# Patient Record
Sex: MALE | Race: BLACK OR AFRICAN AMERICAN | Employment: OTHER | URBAN - METROPOLITAN AREA
[De-identification: names, ages, dates, MRNs, and addresses within clinical notes are randomized per-mention and may not be internally consistent; named-entity substitution may affect disease eponyms.]

---

## 2017-01-06 ENCOUNTER — ANTI-COAG VISIT (OUTPATIENT)
Dept: PHARMACY | Age: 47
End: 2017-01-06

## 2017-01-06 LAB
INR BLD: 2.9
PROTIME: 34.4 SECONDS

## 2017-02-01 ENCOUNTER — HOSPITAL ENCOUNTER (OUTPATIENT)
Dept: OTHER | Age: 47
Discharge: HOME | End: 2017-02-28
Attending: INTERNAL MEDICINE | Admitting: INTERNAL MEDICINE

## 2017-02-07 ENCOUNTER — ANTI-COAG VISIT (OUTPATIENT)
Dept: PHARMACY | Age: 47
End: 2017-02-07

## 2017-02-07 LAB — INR BLD: 1.5

## 2017-02-20 ENCOUNTER — TELEPHONE (OUTPATIENT)
Dept: PHARMACY | Age: 47
End: 2017-02-20

## 2017-02-23 ENCOUNTER — ANTI-COAG VISIT (OUTPATIENT)
Dept: PHARMACY | Age: 47
End: 2017-02-23

## 2017-02-23 LAB
INR BLD: 2.2
PROTIME: 26 SECONDS

## 2017-03-23 ENCOUNTER — ANTI-COAG VISIT (OUTPATIENT)
Dept: PHARMACY | Age: 47
End: 2017-03-23

## 2017-03-23 LAB — INR BLD: 3

## 2017-04-20 ENCOUNTER — ANTI-COAG VISIT (OUTPATIENT)
Dept: PHARMACY | Age: 47
End: 2017-04-20

## 2017-04-20 LAB — INR BLD: 2.1

## 2017-05-24 ENCOUNTER — ANTI-COAG VISIT (OUTPATIENT)
Dept: PHARMACY | Age: 47
End: 2017-05-24

## 2017-05-24 LAB
INR BLD: 2.5
PROTIME: 29.6 SECONDS

## 2017-06-21 ENCOUNTER — TELEPHONE (OUTPATIENT)
Dept: PHARMACY | Age: 47
End: 2017-06-21

## 2017-06-29 ENCOUNTER — ANTI-COAG VISIT (OUTPATIENT)
Dept: PHARMACY | Age: 47
End: 2017-06-29

## 2017-06-29 LAB — INR BLD: 3.5

## 2017-08-01 ENCOUNTER — ANTI-COAG VISIT (OUTPATIENT)
Dept: PHARMACY | Age: 47
End: 2017-08-01

## 2017-08-01 ENCOUNTER — HOSPITAL ENCOUNTER (OUTPATIENT)
Dept: OTHER | Age: 47
Discharge: HOME | End: 2017-08-31
Attending: INTERNAL MEDICINE | Admitting: INTERNAL MEDICINE

## 2017-08-01 LAB
INR BLD: 1.5
PROTIME: 17.8 SECONDS

## 2017-08-15 ENCOUNTER — TELEPHONE (OUTPATIENT)
Dept: PHARMACY | Age: 47
End: 2017-08-15

## 2017-08-17 ENCOUNTER — ANTI-COAG VISIT (OUTPATIENT)
Dept: PHARMACY | Age: 47
End: 2017-08-17

## 2017-08-17 LAB — INR BLD: 2.3

## 2017-09-07 ENCOUNTER — ANTI-COAG VISIT (OUTPATIENT)
Dept: PHARMACY | Age: 47
End: 2017-09-07

## 2017-09-07 LAB
INR BLD: 2.7
PROTIME: 32.4 SECONDS

## 2017-09-21 ENCOUNTER — TELEPHONE (OUTPATIENT)
Dept: PHARMACY | Age: 47
End: 2017-09-21

## 2017-09-26 ENCOUNTER — TELEPHONE (OUTPATIENT)
Dept: PHARMACY | Age: 47
End: 2017-09-26

## 2017-10-06 ENCOUNTER — ANTI-COAG VISIT (OUTPATIENT)
Dept: PHARMACY | Age: 47
End: 2017-10-06

## 2017-10-06 LAB
INR BLD: 2.2
PROTIME: 26.7 SECONDS

## 2017-10-06 NOTE — PROGRESS NOTES
Mr. Tish Luna is a 56 y.o. y/o male with history ofpulmonary embolism with infarction. He has been taking warfarin since 2001 who presents today for anticoagulation monitoring and adjustment. Pertinent PMH: had a DVT and two pulmonary embolism. (+) hepatitis B (2/2016)  Patient Reported Findings:  Yes     No  [x]   []       Patient verifies current dosing regimen as listed  []   [x]       S/S bleeding/bruising/swelling/SOB  []   [x]       Blood in urine or stool  []   [x]       Procedures scheduled in the future at this time  [x]   []       Missed Dose Got a tattoo (9/28), held warfarin for 3 days prior in order to get  []   [x]       Extra Dose  []   [x]       Change in medications  [x]   []       Change in health/diet/appetite patient states that he has switched to a vegan diet, is controlling vegetables   [x]   []       Change in alcohol use- had some alcohol about 3 nights ago  []   [x]       Change in activity  []   [x]       Hospital admission  []   [x]       Emergency department visit  []   [x]       Other complaints    Clinical Outcomes:  Yes     No  []   [x]       Major bleeding event  []   [x]       Thromboembolic event  Pt holds 1/2 dose of warfarin on nights he drinks. Aware of the impact of alcohol. Duration of warfarin Therapy: indefinitely  INR Range:  2.0-3.0  Cautious when holding warfarin--INR drops quickly      INR 2.2 today  Continue same weekly dose of 3.75mg (1/2 tablet) on Mon, Wed & Fri and 7.5mg (1 tablet) all other days  Encouraged to maintain a consistency of vegetables/salads. Recheck INR in 4 weeks, 11/3.     Referring PCP is Dr Daniel Peace  INR (no units)   Date Value   10/06/2017 2.2   09/07/2017 2.7   08/17/2017 2.3   08/01/2017 1.5

## 2017-10-06 NOTE — MR AVS SNAPSHOT
After Visit Summary             Rony Moralez   10/6/2017 9:45 AM   Anti-coag visit    Description:  Male : 1970   Provider:  MIGUEL ANGEL Hartley [facility]   Department:  [address]/[facility]              Your Follow-Up and Future Appointments         Below is a list of your follow-up and future appointments. This may not be a complete list as you may have made appointments directly with providers that we are not aware of or your providers may have made some for you. Please call your providers to confirm appointments. It is important to keep your appointments. Please bring your current insurance card, photo ID, co-pay, and all medication bottles to your appointment. If self-pay, payment is expected at the time of service. Your To-Do List     Future Appointments Provider Department Dept Phone    11/3/2017 9:30 AM [facility] [phone]         Information from Your Visit        Department     Name Address Phone Fax    [address] [facility] [place] [address] [phone]      Anticoagulation Summary as of 10/6/2017              Today's INR 2.2    Next INR check 11/3/2017      Description           Continue same weekly dose of 3.75mg (1/2 tablet) on Mon, Wed & Fri and 7.5mg (1 tablet) all other days. Vital Signs     Smoking Status                   Former Smoker              Medications and Orders      Your Current Medications Are              Multiple Vitamins-Minerals (CENTRUM ADULTS PO) Take 1 tablet by mouth daily    warfarin (COUMADIN) 7.5 MG tablet Take 7.5 mg by mouth See Admin Instructions Dose adjusted by [facility]    traMADol (ULTRAM) 50 MG tablet Take 50 mg by mouth every 6 hours as needed.         Allergies              Morphine Nausea And Vomiting      We Ordered/Performed the following           Protime-INR     Comments: This external order was created through the results console. Result Summary for Protime-INR      Result Information     Status          Final result (Resulted: 10/6/2017 10:38 AM)           10/6/2017 10:38 AM      Component Results     Component Value Ref Range & Units Status    INR 2.2  Final    Protime 26.7 seconds Final               Additional Information        Basic Information     Date Of Birth Sex Race Ethnicity Preferred Language    1970 Male Black Non-/Non  English      Problem List as of 10/6/2017                 GI bleed    Acute hepatitis B    Hematemesis    Other pulmonary embolism and infarction      Preventive Care        Date Due    Tetanus Combination Vaccine (1 - Tdap) 2/16/1990    Cholesterol Screening 2/16/2011    Yearly Flu Vaccine (1) 9/1/2017            MyChart Signup           [employer] allows you to send messages to your doctor, view your test results, renew your prescriptions, schedule appointments, view visit notes, and more. How Do I Sign Up? 1. In your Internet browser, go to [email or web address]  2. Click on the Sign Up Now link in the Sign In box. You will see the New Member Sign Up page. 3. Enter your [facility] Access Code exactly as it appears below. You will not need to use this code after youve completed the sign-up process. If you do not sign up before the expiration date, you must request a new code. [employer] Access Code: [email or web address]  Expires: 11/6/2017 10:59 AM    4. Enter your Social Security Number ([id number]) and Date of Birth (mm/dd/yyyy) as indicated and click Submit. You will be taken to the next sign-up page. 5. Create a Unbound ID. This will be your [facility] login ID and cannot be changed, so think of one that is secure and easy to remember. 6. Create a [facility] password. You can change your password at any time. 7. Enter your Password Reset Question and Answer.  This can be used at a later time if you forget your password. 8. Enter your e-mail address. You will receive e-mail notification when new information is available in [address] 9. Click Sign Up. You can now view your medical record. Additional Information  If you have questions, please contact the physician practice where you receive care. Remember, MyChart is NOT to be used for urgent needs. For medical emergencies, dial 911. For questions regarding your MyChart account call [phone]. If you have a clinical question, please call your doctor's office. October 2017 Details    Sun Mon Tue Wed Thu Fri Sat     1               2               3               4               5               6      3.75 mg   See details      7      7.5 mg           8      7.5 mg         9      3.75 mg         10      7.5 mg         11      3.75 mg         12      7.5 mg         13      3.75 mg         14      7.5 mg           15      7.5 mg         16      3.75 mg         17      7.5 mg         18      3.75 mg         19      7.5 mg         20      3.75 mg         21      7.5 mg           22      7.5 mg         23      3.75 mg         24      7.5 mg         25      3.75 mg         26      7.5 mg         27      3.75 mg         28      7.5 mg           29      7.5 mg         30      3.75 mg         31      7.5 mg              Date Details   10/06 This INR check               How to take your warfarin dose              To take:  3.75 mg Take 0.5 of a 7.5 mg tablet. To take:  7.5 mg Take 1 of the 7.5 mg tablets.            November 2017 Details    Sun Mon Tue Wed Thu Fri Sat        1      3.75 mg         2      7.5 mg         3      3.75 mg         4                 5               6               7               8               9               10               11                 12               13               14               15               16               17               18                 19               20               21

## 2017-11-01 ENCOUNTER — HOSPITAL ENCOUNTER (OUTPATIENT)
Dept: OTHER | Age: 47
Discharge: HOME | End: 2017-11-30
Attending: INTERNAL MEDICINE | Admitting: INTERNAL MEDICINE

## 2017-11-03 ENCOUNTER — TELEPHONE (OUTPATIENT)
Dept: PHARMACY | Age: 47
End: 2017-11-03

## 2017-11-09 ENCOUNTER — TELEPHONE (OUTPATIENT)
Dept: PHARMACY | Age: 47
End: 2017-11-09

## 2017-11-13 ENCOUNTER — ANTI-COAG VISIT (OUTPATIENT)
Dept: PHARMACY | Age: 47
End: 2017-11-13

## 2017-11-13 LAB
INR BLD: 2.9
PROTIME: 34.8 SECONDS

## 2017-11-13 NOTE — PROGRESS NOTES
Mr. Arun Navarro is a 56 y.o. y/o male with history ofpulmonary embolism with infarction. He has been taking warfarin since 2001 who presents today for anticoagulation monitoring and adjustment. Pertinent PMH: had a DVT and two pulmonary embolism. (+) hepatitis B (2/2016)  Patient Reported Findings:  Yes     No  [x]   []       Patient verifies current dosing regimen as listed  []   [x]       S/S bleeding/bruising/swelling/SOB  []   [x]       Blood in urine or stool  []   [x]       Procedures scheduled in the future at this time  [x]   []       Missed Dose Missed dose Fri, Sat, Sun b/c ran out of warf and could not p/u till mon  []   [x]       Extra Dose  []   [x]       Change in medications  [x]   []       Change in health/diet/appetite patient states that he has switched to a vegan diet, is controlling vegetables. Thinks that he is going to wait until after the holidays  []   [x]       Change in alcohol use  []   [x]       Change in activity  []   [x]       Hospital admission  []   [x]       Emergency department visit  []   [x]       Other complaints    Clinical Outcomes:  Yes     No  []   [x]       Major bleeding event  []   [x]       Thromboembolic event  Pt holds 1/2 dose of warfarin on nights he drinks. Aware of the impact of alcohol. Duration of warfarin Therapy: indefinitely  INR Range:  2.0-3.0  Cautious when holding warfarin--INR drops quickly    INR 2.9 today  Continue same weekly dose of 3.75mg (1/2 tablet) on Mon, Wed & Fri and 7.5mg (1 tablet) all other days  Encouraged to maintain a consistency of vegetables/salads. Recheck INR in 4 weeks, 12/11.     Referring PCP is Dr Fredy Urban  INR (no units)   Date Value   11/13/2017 2.9   10/06/2017 2.2   09/07/2017 2.7   08/17/2017 2.3

## 2017-12-01 ENCOUNTER — HOSPITAL ENCOUNTER (OUTPATIENT)
Dept: OTHER | Age: 47
Discharge: HOME | End: 2017-12-31
Attending: INTERNAL MEDICINE | Admitting: INTERNAL MEDICINE

## 2017-12-12 ENCOUNTER — ANTI-COAG VISIT (OUTPATIENT)
Dept: PHARMACY | Age: 47
End: 2017-12-12

## 2017-12-12 LAB
INR BLD: 1.3
PROTIME: 15.7 SECONDS

## 2018-01-01 ENCOUNTER — HOSPITAL ENCOUNTER (OUTPATIENT)
Dept: OTHER | Age: 48
Discharge: HOME | End: 2018-01-31
Attending: INTERNAL MEDICINE | Admitting: INTERNAL MEDICINE

## 2018-01-12 ENCOUNTER — TELEPHONE (OUTPATIENT)
Dept: PHARMACY | Age: 48
End: 2018-01-12

## 2018-02-01 ENCOUNTER — HOSPITAL ENCOUNTER (OUTPATIENT)
Dept: OTHER | Age: 48
Discharge: HOME | End: 2018-02-28
Attending: FAMILY MEDICINE | Admitting: FAMILY MEDICINE

## 2018-02-08 ENCOUNTER — ANTI-COAG VISIT (OUTPATIENT)
Dept: PHARMACY | Age: 48
End: 2018-02-08

## 2018-02-08 LAB
INR BLD: 1.9
PROTIME: 22.2 SECONDS

## 2018-02-28 ENCOUNTER — ANTI-COAG VISIT (OUTPATIENT)
Dept: PHARMACY | Age: 48
End: 2018-02-28

## 2018-02-28 LAB
INR BLD: 1.7
PROTIME: 20.5 SECONDS

## 2018-02-28 NOTE — PROGRESS NOTES
Mr. Rom Harding is a 53 y.o. y/o male with history ofpulmonary embolism with infarction. He has been taking warfarin since 2001   He presents today for anticoagulation monitoring and adjustment. Pertinent PMH: had a DVT and two pulmonary embolism. (+) hepatitis B (2/2016)  Patient Reported Findings:  Yes     No  [x]   []       Patient verifies current dosing regimen as listed  []   [x]       S/S bleeding/bruising/swelling/SOB  []   [x]       Blood in urine or stool  []   [x]       Procedures scheduled in the future at this time  [x]   []       Missed Dose  Missed last Thursday and Friday, took half dose on Sunday d/t not having the refill picked up.   []   [x]       Extra Dose   []   [x]       Change in medications  []   [x]       Change in health/diet/appetite---started back at the gym last week. Eating healthier this time.     []   [x]       Change in alcohol use  []   [x]       Change in activity  []   [x]       Hospital admission  []   [x]       Emergency department visit  []   [x]       Other complaints    Clinical Outcomes:  Yes     No  []   [x]       Major bleeding event  []   [x]       Thromboembolic event  Pt holds 1/2 dose of warfarin on nights he drinks. Aware of the impact of alcohol. Duration of warfarin Therapy: indefinitely  INR Range:  2.0-3.0  Cautious when holding warfarin--INR drops quickly    INR 1.7 today d/t missed doses last week   Take 7.5 mg today only, then continue same weekly dose of 3.75mg (1/2 tablet) on Mon, Wed & Fri and 7.5mg (1 tablet) all other days. Encouraged to maintain a consistency of vegetables/salads. Recheck INR in 2 weeks.      Referring PCP is Dr Xuan Clay(FAX results)  INR (no units)   Date Value   02/28/2018 1.7   02/08/2018 1.9   12/12/2017 1.3   11/13/2017 2.9

## 2018-03-01 ENCOUNTER — HOSPITAL ENCOUNTER (OUTPATIENT)
Dept: OTHER | Age: 48
Discharge: HOME | End: 2018-03-31
Attending: FAMILY MEDICINE | Admitting: FAMILY MEDICINE

## 2018-03-15 ENCOUNTER — ANTI-COAG VISIT (OUTPATIENT)
Dept: PHARMACY | Age: 48
End: 2018-03-15

## 2018-03-15 LAB
INR BLD: 2.5
PROTIME: 29.7 SECONDS

## 2018-03-15 NOTE — PROGRESS NOTES
Mr. Milagros Napier is a 53 y.o. y/o male with history ofpulmonary embolism with infarction. He has been taking warfarin since 2001   He presents today for anticoagulation monitoring and adjustment. Pertinent PMH: had a DVT and two pulmonary embolism. (+) hepatitis B (2/2016)  Patient Reported Findings:  Yes     No  [x]   []       Patient verifies current dosing regimen as listed  []   [x]       S/S bleeding/bruising/swelling/SOB  []   [x]       Blood in urine or stool  []   [x]       Procedures scheduled in the future at this time  []   [x]       Missed Dose     []   [x]       Extra Dose   []   [x]       Change in medications  [x]   []       Change in health/diet/appetite---started back at the gym last week. Eating healthier this time. Juices and eat lots of spinach. consistent  []   [x]       Change in alcohol use  []   [x]       Change in activity  []   [x]       Hospital admission  []   [x]       Emergency department visit  []   [x]       Other complaints    Clinical Outcomes:  Yes     No  []   [x]       Major bleeding event  []   [x]       Thromboembolic event  Pt holds 1/2 dose of warfarin on nights he drinks. Aware of the impact of alcohol. Duration of warfarin Therapy: indefinitely  INR Range:  2.0-3.0  Cautious when holding warfarin--INR drops quickly    INR 2.5 today   Continue same weekly dose of 3.75mg (1/2 tablet) on Mon, Wed & Fri and 7.5mg (1 tablet) all other days. Encouraged to maintain a consistency of vegetables/salads. Recheck INR in 4 weeks, 4/12.      Referring PCP is Dr Rom Clay(FAX results)  INR (no units)   Date Value   03/15/2018 2.5   02/28/2018 1.7   02/08/2018 1.9   12/12/2017 1.3

## 2018-04-01 ENCOUNTER — HOSPITAL ENCOUNTER (OUTPATIENT)
Dept: OTHER | Age: 48
Discharge: HOME | End: 2018-04-30
Attending: FAMILY MEDICINE | Admitting: FAMILY MEDICINE

## 2018-04-13 ENCOUNTER — TELEPHONE (OUTPATIENT)
Dept: PHARMACY | Age: 48
End: 2018-04-13

## 2018-04-20 ENCOUNTER — ANTI-COAG VISIT (OUTPATIENT)
Dept: PHARMACY | Age: 48
End: 2018-04-20

## 2018-04-20 LAB
INR BLD: 1.6
PROTIME: 19 SECONDS

## 2018-05-01 ENCOUNTER — HOSPITAL ENCOUNTER (OUTPATIENT)
Dept: OTHER | Age: 48
Discharge: HOME | End: 2018-05-31
Attending: FAMILY MEDICINE | Admitting: FAMILY MEDICINE

## 2018-05-11 ENCOUNTER — ANTI-COAG VISIT (OUTPATIENT)
Dept: PHARMACY | Age: 48
End: 2018-05-11

## 2018-05-11 LAB
INR BLD: 3.7
PROTIME: 44.1 SECONDS

## 2018-05-25 ENCOUNTER — TELEPHONE (OUTPATIENT)
Dept: PHARMACY | Age: 48
End: 2018-05-25

## 2018-06-01 ENCOUNTER — HOSPITAL ENCOUNTER (OUTPATIENT)
Dept: OTHER | Age: 48
Discharge: HOME | End: 2018-06-30
Attending: FAMILY MEDICINE | Admitting: FAMILY MEDICINE

## 2018-06-08 ENCOUNTER — ANTI-COAG VISIT (OUTPATIENT)
Dept: PHARMACY | Age: 48
End: 2018-06-08

## 2018-06-08 LAB
INR BLD: 3.2
PROTIME: 38.2 SECONDS

## 2018-06-29 ENCOUNTER — TELEPHONE (OUTPATIENT)
Dept: PHARMACY | Age: 48
End: 2018-06-29

## 2018-07-01 ENCOUNTER — HOSPITAL ENCOUNTER (OUTPATIENT)
Dept: OTHER | Age: 48
Discharge: HOME | End: 2018-07-31
Attending: FAMILY MEDICINE | Admitting: FAMILY MEDICINE

## 2018-07-06 ENCOUNTER — ANTI-COAG VISIT (OUTPATIENT)
Dept: PHARMACY | Age: 48
End: 2018-07-06

## 2018-07-06 LAB
INR BLD: 2
PROTIME: 24.4 SECONDS

## 2018-08-01 ENCOUNTER — HOSPITAL ENCOUNTER (OUTPATIENT)
Dept: OTHER | Age: 48
Discharge: HOME | End: 2018-08-31
Attending: FAMILY MEDICINE | Admitting: FAMILY MEDICINE

## 2018-08-03 ENCOUNTER — TELEPHONE (OUTPATIENT)
Dept: PHARMACY | Age: 48
End: 2018-08-03

## 2018-08-17 ENCOUNTER — ANTI-COAG VISIT (OUTPATIENT)
Dept: PHARMACY | Age: 48
End: 2018-08-17

## 2018-08-17 LAB
INR BLD: 1.9
PROTIME: 22.8 SECONDS

## 2018-08-17 NOTE — PROGRESS NOTES
Mr. Jorge Panchal is a 53 y.o. y/o male with history ofpulmonary embolism with infarction. He has been taking warfarin since 2001   He presents today for anticoagulation monitoring and adjustment. Pertinent PMH: had a DVT and two pulmonary embolism. (+) hepatitis B (2/2016)  Patient Reported Findings:  Yes     No  [x]   []       Patient verifies current dosing regimen as listed  []   [x]       S/S bleeding/bruising/swelling/SOB  []   [x]       Blood in urine or stool  []   [x]       Procedures scheduled in the future at this time  []   [x]       Missed Dose    []   [x]       Extra Dose   []   [x]       Change in medications He states that he will be starting medical marijuana in October.  []   [x]       Change in health/diet/appetite-- Juices and consistently eat lots of spinach. He states that he has been light on the vegetables d/t camping recently. []   [x]       Change in alcohol use   []   [x]       Change in activity  []   [x]       Hospital admission  []   [x]       Emergency department visit  []   [x]       Other complaints    Clinical Outcomes:  Yes     No   []   [x]       Major bleeding event  []   [x]       Thromboembolic event    Pt holds 1/2 dose of warfarin on nights he drinks. Aware of the impact of alcohol. Duration of warfarin Therapy: indefinitely  INR Range:  2.0-3.0  Cautious when holding warfarin--INR drops quickly    INR 1.9 today   Take 7.5mg today only then continue same weekly dose of 3.75mg (1/2 tablet) on Mon, Wed & Fri and 7.5mg (1 tablet) all other days. Encouraged to maintain a consistency of vegetables/salads.   Recheck INR in 4 weeks    Referring PCP is Dr Lorraine Clay(FAX results)  INR (no units)   Date Value   08/17/2018 1.9   07/06/2018 2   06/08/2018 3.2   05/11/2018 3.7

## 2018-09-01 ENCOUNTER — HOSPITAL ENCOUNTER (OUTPATIENT)
Dept: OTHER | Age: 48
Discharge: HOME | End: 2018-09-01
Attending: FAMILY MEDICINE | Admitting: FAMILY MEDICINE

## 2018-09-18 ENCOUNTER — TELEPHONE (OUTPATIENT)
Dept: PHARMACY | Age: 48
End: 2018-09-18

## 2018-09-20 ENCOUNTER — ANTI-COAG VISIT (OUTPATIENT)
Dept: PHARMACY | Age: 48
End: 2018-09-20

## 2018-09-20 LAB
INR BLD: 2.1
PROTIME: 25.2 SECONDS

## 2018-09-20 NOTE — PROGRESS NOTES
Mr. Johnson Damon is a 53 y.o. y/o male with history ofpulmonary embolism with infarction. He has been taking warfarin since 2001   He presents today for anticoagulation monitoring and adjustment. Pertinent PMH: had a DVT and two pulmonary embolism. (+) hepatitis B (2/2016)  Patient Reported Findings:  Yes     No  [x]   []       Patient verifies current dosing regimen as listed  []   [x]       S/S bleeding/bruising/swelling/SOB  []   [x]       Blood in urine or stool  []   [x]       Procedures scheduled in the future at this time  []   [x]       Missed Dose    []   [x]       Extra Dose   []   [x]       Change in medications He states that he will be starting medical marijuana in October.  []   [x]       Change in health/diet/appetite--Has not been juicing recently but anticipates returning as of today and consistently eat lots of spinach. []   [x]       Change in alcohol use   []   [x]       Change in activity  []   [x]       Hospital admission  []   [x]       Emergency department visit  []   [x]       Other complaints    Clinical Outcomes:  Yes     No   []   [x]       Major bleeding event  []   [x]       Thromboembolic event    Pt holds 1/2 dose of warfarin on nights he drinks. Aware of the impact of alcohol. Duration of warfarin Therapy: indefinitely  INR Range:  2.0-3.0  Cautious when holding warfarin--INR drops quickly    INR 2.1 today   Continue same weekly dose of 3.75mg (1/2 tablet) on Mon, Wed & Fri and 7.5mg (1 tablet) all other days. Encouraged to maintain a consistency of vegetables/salads.   Recheck INR in 4 weeks    Referring PCP is Dr Greg Clay(FAX results)  INR (no units)   Date Value   09/20/2018 2.1   08/17/2018 1.9   07/06/2018 2   06/08/2018 3.2

## 2018-10-26 ENCOUNTER — TELEPHONE (OUTPATIENT)
Dept: PHARMACY | Age: 48
End: 2018-10-26

## 2018-11-01 ENCOUNTER — ANTI-COAG VISIT (OUTPATIENT)
Dept: PHARMACY | Age: 48
End: 2018-11-01
Payer: COMMERCIAL

## 2018-11-01 DIAGNOSIS — I82.91 CHRONIC EMBOLISM AND THROMBOSIS OF UNSPECIFIED VEIN: ICD-10-CM

## 2018-11-01 LAB — INTERNATIONAL NORMALIZATION RATIO, POC: 2.2

## 2018-11-01 PROCEDURE — 85610 PROTHROMBIN TIME: CPT

## 2018-11-01 PROCEDURE — 99211 OFF/OP EST MAY X REQ PHY/QHP: CPT

## 2018-12-06 ENCOUNTER — TELEPHONE (OUTPATIENT)
Dept: PHARMACY | Age: 48
End: 2018-12-06

## 2018-12-24 ENCOUNTER — ANTI-COAG VISIT (OUTPATIENT)
Dept: PHARMACY | Age: 48
End: 2018-12-24
Payer: COMMERCIAL

## 2018-12-24 DIAGNOSIS — I82.91 CHRONIC EMBOLISM AND THROMBOSIS OF UNSPECIFIED VEIN: ICD-10-CM

## 2018-12-24 LAB — INTERNATIONAL NORMALIZATION RATIO, POC: 1.7

## 2018-12-24 PROCEDURE — 85610 PROTHROMBIN TIME: CPT

## 2018-12-24 PROCEDURE — 99211 OFF/OP EST MAY X REQ PHY/QHP: CPT

## 2019-01-07 ENCOUNTER — TELEPHONE (OUTPATIENT)
Dept: PHARMACY | Age: 49
End: 2019-01-07

## 2019-01-11 ENCOUNTER — ANTI-COAG VISIT (OUTPATIENT)
Dept: PHARMACY | Age: 49
End: 2019-01-11
Payer: COMMERCIAL

## 2019-01-11 DIAGNOSIS — I82.91 CHRONIC EMBOLISM AND THROMBOSIS OF UNSPECIFIED VEIN: ICD-10-CM

## 2019-01-11 LAB — INTERNATIONAL NORMALIZATION RATIO, POC: 1.6

## 2019-01-11 PROCEDURE — 99211 OFF/OP EST MAY X REQ PHY/QHP: CPT

## 2019-01-11 PROCEDURE — 85610 PROTHROMBIN TIME: CPT

## 2019-01-25 ENCOUNTER — TELEPHONE (OUTPATIENT)
Dept: PHARMACY | Age: 49
End: 2019-01-25

## 2019-02-05 ENCOUNTER — ANTI-COAG VISIT (OUTPATIENT)
Dept: PHARMACY | Age: 49
End: 2019-02-05
Payer: COMMERCIAL

## 2019-02-05 DIAGNOSIS — Z79.01 LONG TERM (CURRENT) USE OF ANTICOAGULANTS: ICD-10-CM

## 2019-02-05 DIAGNOSIS — I82.91 CHRONIC EMBOLISM AND THROMBOSIS OF UNSPECIFIED VEIN: ICD-10-CM

## 2019-02-05 LAB — INTERNATIONAL NORMALIZATION RATIO, POC: 2.4

## 2019-02-05 PROCEDURE — 99211 OFF/OP EST MAY X REQ PHY/QHP: CPT

## 2019-02-05 PROCEDURE — 85610 PROTHROMBIN TIME: CPT

## 2019-02-26 ENCOUNTER — TELEPHONE (OUTPATIENT)
Dept: PHARMACY | Age: 49
End: 2019-02-26

## 2019-03-12 ENCOUNTER — ANTI-COAG VISIT (OUTPATIENT)
Dept: PHARMACY | Age: 49
End: 2019-03-12
Payer: COMMERCIAL

## 2019-03-12 DIAGNOSIS — I82.91 CHRONIC EMBOLISM AND THROMBOSIS OF UNSPECIFIED VEIN: ICD-10-CM

## 2019-03-12 DIAGNOSIS — Z79.01 LONG TERM (CURRENT) USE OF ANTICOAGULANTS: ICD-10-CM

## 2019-03-12 LAB — INTERNATIONAL NORMALIZATION RATIO, POC: 1.4

## 2019-03-12 PROCEDURE — 85610 PROTHROMBIN TIME: CPT

## 2019-03-12 PROCEDURE — 99212 OFFICE O/P EST SF 10 MIN: CPT

## 2019-03-26 ENCOUNTER — TELEPHONE (OUTPATIENT)
Dept: PHARMACY | Age: 49
End: 2019-03-26

## 2019-03-26 ENCOUNTER — APPOINTMENT (OUTPATIENT)
Dept: PHARMACY | Age: 49
End: 2019-03-26
Payer: COMMERCIAL

## 2019-04-23 ENCOUNTER — ANTI-COAG VISIT (OUTPATIENT)
Dept: PHARMACY | Age: 49
End: 2019-04-23
Payer: COMMERCIAL

## 2019-04-23 DIAGNOSIS — I82.91 CHRONIC EMBOLISM AND THROMBOSIS OF UNSPECIFIED VEIN: ICD-10-CM

## 2019-04-23 DIAGNOSIS — Z79.01 LONG TERM (CURRENT) USE OF ANTICOAGULANTS: ICD-10-CM

## 2019-04-23 LAB — INTERNATIONAL NORMALIZATION RATIO, POC: 2.8

## 2019-04-23 PROCEDURE — 85610 PROTHROMBIN TIME: CPT

## 2019-04-23 PROCEDURE — 99211 OFF/OP EST MAY X REQ PHY/QHP: CPT

## 2019-04-23 NOTE — PROGRESS NOTES
Mr. Maria Elena Barragan is a 51 y.o. y/o male with history ofpulmonary embolism with infarction. He has been taking warfarin since 2001   He presents today for anticoagulation monitoring and adjustment. Pertinent PMH: had a DVT and two pulmonary embolism. (+) hepatitis B (2/2016)  Patient Reported Findings:  Yes     No  [x]   []       Patient verifies current dosing regimen as listed  []   [x]       S/S bleeding/bruising/swelling/SOB  []   [x]       Blood in urine or stool  []   [x]       Procedures scheduled in the future at this time  [x]   []       Missed Dose 2 days ago   []   [x]       Extra Dose   []   [x]       Change in medications He states that he may be starting medical marijuana in the future. []   [x]       Change in health/diet/appetite-- Just recently returned to [employer] daily and states that he is back to gym so he is eating normal spinach   []   [x]       Change in alcohol use- Pt holds 1/2 dose of warfarin on nights he drinks. Aware of the impact of alcohol.  []   [x]       Change in activity  []   [x]       Hospital admission  []   [x]       Emergency department visit  []   [x]       Other complaints    Clinical Outcomes:  Yes     No   []   [x]       Major bleeding event  []   [x]       Thromboembolic event  Takes warfarin in AM  Duration of warfarin Therapy: indefinitely  INR Range:  2.0-3.0  Cautious when holding warfarin--INR drops quickly. INR 2.8 today  Continue same weekly dose of 3.75mg (1/2 tablet) on Mon, Wed & Fri and 7.5mg (1 tablet) all other days. Encouraged to maintain a consistency of vegetables/salads.   Recheck INR in 3 weeks on 5/14    Referring Dr. Malissa Hernandez  INR (no units)   Date Value   04/23/2019 2.8   03/12/2019 1.4   02/05/2019 2.4   01/11/2019 1.6   09/20/2018 2.1   08/17/2018 1.9   07/06/2018 2   06/08/2018 3.2

## 2019-05-22 ENCOUNTER — ANTI-COAG VISIT (OUTPATIENT)
Dept: PHARMACY | Age: 49
End: 2019-05-22
Payer: COMMERCIAL

## 2019-05-22 DIAGNOSIS — I82.91 CHRONIC EMBOLISM AND THROMBOSIS OF UNSPECIFIED VEIN: ICD-10-CM

## 2019-05-22 DIAGNOSIS — Z79.01 LONG TERM (CURRENT) USE OF ANTICOAGULANTS: ICD-10-CM

## 2019-05-22 LAB — INTERNATIONAL NORMALIZATION RATIO, POC: 1.2

## 2019-05-22 PROCEDURE — 99211 OFF/OP EST MAY X REQ PHY/QHP: CPT

## 2019-05-22 PROCEDURE — 85610 PROTHROMBIN TIME: CPT

## 2019-05-22 NOTE — PROGRESS NOTES
Mr. Rock Story is a 51 y.o. y/o male with history ofpulmonary embolism with infarction. He has been taking warfarin since 2001   He presents today for anticoagulation monitoring and adjustment. Pertinent PMH: had a DVT and two pulmonary embolism. (+) hepatitis B (2/2016)  Patient Reported Findings:  Yes     No  [x]   []       Patient verifies current dosing regimen as listed  []   [x]       S/S bleeding/bruising/swelling/SOB  []   [x]       Blood in urine or stool  []   [x]       Procedures scheduled in the future at this time  [x]   []       Missed Dose 3 doses on 5/18-5/20. Restarted   []   [x]       Extra Dose   []   [x]       Change in medications   []   [x]       Change in health/diet/appetite-- Just recently returned to [employer] daily and states that he is back to gym so he is eating normal spinach   []   [x]       Change in alcohol use- Pt holds 1/2 dose of warfarin on nights he drinks. Aware of the impact of alcohol.  []   [x]       Change in activity  []   [x]       Hospital admission  []   [x]       Emergency department visit  []   [x]       Other complaints    Clinical Outcomes:  Yes     No   []   [x]       Major bleeding event  []   [x]       Thromboembolic event  Takes warfarin in AM  Duration of warfarin Therapy: indefinitely  INR Range:  2.0-3.0  Cautious when holding warfarin--INR drops quickly. INR 1.2 today d/t 3 consecutive missed doses  Took 7.5mg today already then will continue same weekly dose of 3.75mg (1/2 tablet) on Mon, Wed & Fri and 7.5mg (1 tablet) all other days. Encouraged to maintain a consistency of vegetables/salads.   Recheck INR in 2 weeks on 6/5    Referring Dr. Jam Dennison  INR (no units)   Date Value   05/22/2019 1.2   04/23/2019 2.8   03/12/2019 1.4   02/05/2019 2.4   09/20/2018 2.1   08/17/2018 1.9   07/06/2018 2   06/08/2018 3.2

## 2019-06-05 ENCOUNTER — TELEPHONE (OUTPATIENT)
Dept: PHARMACY | Age: 49
End: 2019-06-05

## 2019-06-05 NOTE — TELEPHONE ENCOUNTER
Pt left v/m asking to cancel [facility] appt , would like to r/s next Thursday around the same time ( 915). Called pt back had to leave v/m , let him know I r/s on 6/13 at 845 , we did not have any appts available on wed,thurs or fri of next week around 9 am . Asked that he call back to confirm date and time is ok.

## 2019-06-13 ENCOUNTER — ANTI-COAG VISIT (OUTPATIENT)
Dept: PHARMACY | Age: 49
End: 2019-06-13
Payer: COMMERCIAL

## 2019-06-13 DIAGNOSIS — I82.91 CHRONIC EMBOLISM AND THROMBOSIS OF UNSPECIFIED VEIN: ICD-10-CM

## 2019-06-13 DIAGNOSIS — Z79.01 LONG TERM (CURRENT) USE OF ANTICOAGULANTS: ICD-10-CM

## 2019-06-13 LAB — INTERNATIONAL NORMALIZATION RATIO, POC: 1.9

## 2019-06-13 PROCEDURE — 85610 PROTHROMBIN TIME: CPT

## 2019-06-13 PROCEDURE — 99211 OFF/OP EST MAY X REQ PHY/QHP: CPT

## 2019-06-13 NOTE — TELEPHONE ENCOUNTER
Warfarin prescription phoned into [facility] to [address] [facility] under Dr. Josselyn Fine  Warfarin 7.5 mg tabs  Take 3.75 mg Mon, Wed, Fri, and 7.5mg all other days   90 days   2 refills  Carol Ann Bolanos, PharmD, [facility]

## 2019-06-13 NOTE — PROGRESS NOTES
Mr. Chanel Oliva is a 51 y.o. y/o male with history ofpulmonary embolism with infarction. He has been taking warfarin since 2001   He presents today for anticoagulation monitoring and adjustment. Pertinent PMH: had a DVT and two pulmonary embolism. (+) hepatitis B (2/2016)  Patient Reported Findings:  Yes     No  [x]   []       Patient verifies current dosing regimen as listed  []   [x]       S/S bleeding/bruising/swelling/SOB  []   [x]       Blood in urine or stool  []   [x]       Procedures scheduled in the future at this time  [x]   []       Missed Dose 3 doses on 5/18-5/20. Restarted   []   [x]       Extra Dose   []   [x]       Change in medications   []   [x]       Change in health/diet/appetite-- Just recently returned to [employer] daily and states that he is back to gym so he is eating normal spinach   []   [x]       Change in alcohol use- Pt holds 1/2 dose of warfarin on nights he drinks. Aware of the impact of alcohol.  []   [x]       Change in activity  []   [x]       Hospital admission  []   [x]       Emergency department visit  []   [x]       Other complaints    Clinical Outcomes:  Yes     No   []   [x]       Major bleeding event  []   [x]       Thromboembolic event  Takes warfarin in AM  Duration of warfarin Therapy: indefinitely  INR Range:  2.0-3.0  Cautious when holding warfarin--INR drops quickly. INR 1.9 today d/t 1 missed dose on Tuesday   Take 7.5mg tomorrow then continue same weekly dose of 3.75mg (1/2 tablet) on Mon, Wed & Fri and 7.5mg (1 tablet) all other days. Encouraged to maintain a consistency of vegetables/salads. Reminded him it is important to be consistent with his warfarin.    Recheck INR in 3 weeks on 7/2  Refill called into [facility] pharmacy   Return to 4 weeks when appropriate     Referring Dr. Tee Alfaro  INR (no units)   Date Value   06/13/2019 1.9   05/22/2019 1.2   04/23/2019 2.8   03/12/2019 1.4   09/20/2018 2.1   08/17/2018 1.9   07/06/2018 2   06/08/2018 3.2

## 2019-07-09 ENCOUNTER — TELEPHONE (OUTPATIENT)
Dept: PHARMACY | Age: 49
End: 2019-07-09

## 2019-07-23 ENCOUNTER — ANTI-COAG VISIT (OUTPATIENT)
Dept: PHARMACY | Age: 49
End: 2019-07-23
Payer: COMMERCIAL

## 2019-07-23 DIAGNOSIS — Z79.01 LONG TERM (CURRENT) USE OF ANTICOAGULANTS: ICD-10-CM

## 2019-07-23 DIAGNOSIS — I82.91 CHRONIC EMBOLISM AND THROMBOSIS OF UNSPECIFIED VEIN: ICD-10-CM

## 2019-07-23 LAB — INTERNATIONAL NORMALIZATION RATIO, POC: 3.2

## 2019-07-23 PROCEDURE — 85610 PROTHROMBIN TIME: CPT

## 2019-07-23 PROCEDURE — 99211 OFF/OP EST MAY X REQ PHY/QHP: CPT

## 2019-08-13 ENCOUNTER — ANTI-COAG VISIT (OUTPATIENT)
Dept: PHARMACY | Age: 49
End: 2019-08-13
Payer: COMMERCIAL

## 2019-08-13 DIAGNOSIS — Z79.01 LONG TERM (CURRENT) USE OF ANTICOAGULANTS: ICD-10-CM

## 2019-08-13 DIAGNOSIS — I82.91 CHRONIC EMBOLISM AND THROMBOSIS OF UNSPECIFIED VEIN: ICD-10-CM

## 2019-08-13 LAB — INTERNATIONAL NORMALIZATION RATIO, POC: 1.5

## 2019-08-13 PROCEDURE — 85610 PROTHROMBIN TIME: CPT

## 2019-08-13 PROCEDURE — 99211 OFF/OP EST MAY X REQ PHY/QHP: CPT

## 2019-08-13 NOTE — PROGRESS NOTES
units)   Date Value   08/13/2019 1.5   07/23/2019 3.2   06/13/2019 1.9   05/22/2019 1.2   09/20/2018 2.1   08/17/2018 1.9   07/06/2018 2   06/08/2018 3.2

## 2019-09-03 ENCOUNTER — TELEPHONE (OUTPATIENT)
Dept: PHARMACY | Age: 49
End: 2019-09-03

## 2019-09-05 ENCOUNTER — ANTI-COAG VISIT (OUTPATIENT)
Dept: PHARMACY | Age: 49
End: 2019-09-05
Payer: COMMERCIAL

## 2019-09-05 DIAGNOSIS — I82.91 CHRONIC EMBOLISM AND THROMBOSIS OF UNSPECIFIED VEIN: ICD-10-CM

## 2019-09-05 DIAGNOSIS — Z79.01 LONG TERM (CURRENT) USE OF ANTICOAGULANTS: ICD-10-CM

## 2019-09-05 LAB — INTERNATIONAL NORMALIZATION RATIO, POC: 3

## 2019-09-05 PROCEDURE — 85610 PROTHROMBIN TIME: CPT

## 2019-09-05 PROCEDURE — 99211 OFF/OP EST MAY X REQ PHY/QHP: CPT

## 2019-09-26 ENCOUNTER — TELEPHONE (OUTPATIENT)
Dept: PHARMACY | Age: 49
End: 2019-09-26

## 2019-10-03 ENCOUNTER — ANTI-COAG VISIT (OUTPATIENT)
Dept: PHARMACY | Age: 49
End: 2019-10-03
Payer: COMMERCIAL

## 2019-10-03 DIAGNOSIS — Z79.01 LONG TERM (CURRENT) USE OF ANTICOAGULANTS: ICD-10-CM

## 2019-10-03 DIAGNOSIS — I82.91 CHRONIC EMBOLISM AND THROMBOSIS OF UNSPECIFIED VEIN: ICD-10-CM

## 2019-10-03 LAB — INTERNATIONAL NORMALIZATION RATIO, POC: 3.3

## 2019-10-03 PROCEDURE — 85610 PROTHROMBIN TIME: CPT

## 2019-10-03 PROCEDURE — 99212 OFFICE O/P EST SF 10 MIN: CPT

## 2019-10-09 ENCOUNTER — TELEPHONE (OUTPATIENT)
Dept: PHARMACY | Age: 49
End: 2019-10-09

## 2019-10-14 ENCOUNTER — TELEPHONE (OUTPATIENT)
Dept: PHARMACY | Age: 49
End: 2019-10-14

## 2019-10-17 ENCOUNTER — ANTI-COAG VISIT (OUTPATIENT)
Dept: PHARMACY | Age: 49
End: 2019-10-17
Payer: COMMERCIAL

## 2019-10-17 DIAGNOSIS — Z79.01 LONG TERM (CURRENT) USE OF ANTICOAGULANTS: ICD-10-CM

## 2019-10-17 DIAGNOSIS — I82.91 CHRONIC EMBOLISM AND THROMBOSIS OF UNSPECIFIED VEIN: ICD-10-CM

## 2019-10-17 LAB — INTERNATIONAL NORMALIZATION RATIO, POC: 1.3

## 2019-10-17 PROCEDURE — 85610 PROTHROMBIN TIME: CPT

## 2019-10-17 PROCEDURE — 99212 OFFICE O/P EST SF 10 MIN: CPT

## 2019-10-28 ENCOUNTER — TELEPHONE (OUTPATIENT)
Dept: PHARMACY | Age: 49
End: 2019-10-28

## 2019-10-31 ENCOUNTER — ANTI-COAG VISIT (OUTPATIENT)
Dept: PHARMACY | Age: 49
End: 2019-10-31
Payer: COMMERCIAL

## 2019-10-31 DIAGNOSIS — I82.91 CHRONIC EMBOLISM AND THROMBOSIS OF UNSPECIFIED VEIN: ICD-10-CM

## 2019-10-31 DIAGNOSIS — Z79.01 LONG TERM (CURRENT) USE OF ANTICOAGULANTS: ICD-10-CM

## 2019-10-31 LAB — INTERNATIONAL NORMALIZATION RATIO, POC: 3.8

## 2019-10-31 PROCEDURE — 85610 PROTHROMBIN TIME: CPT

## 2019-10-31 PROCEDURE — 99212 OFFICE O/P EST SF 10 MIN: CPT

## 2019-11-14 ENCOUNTER — TELEPHONE (OUTPATIENT)
Dept: PHARMACY | Age: 49
End: 2019-11-14

## 2019-11-21 ENCOUNTER — ANTI-COAG VISIT (OUTPATIENT)
Dept: PHARMACY | Age: 49
End: 2019-11-21
Payer: COMMERCIAL

## 2019-11-21 DIAGNOSIS — I82.91 CHRONIC EMBOLISM AND THROMBOSIS OF UNSPECIFIED VEIN: ICD-10-CM

## 2019-11-21 DIAGNOSIS — Z79.01 LONG TERM (CURRENT) USE OF ANTICOAGULANTS: ICD-10-CM

## 2019-11-21 LAB — INTERNATIONAL NORMALIZATION RATIO, POC: 4.2

## 2019-11-21 PROCEDURE — 99212 OFFICE O/P EST SF 10 MIN: CPT

## 2019-11-21 PROCEDURE — 85610 PROTHROMBIN TIME: CPT

## 2019-12-03 ENCOUNTER — TELEPHONE (OUTPATIENT)
Dept: PHARMACY | Age: 49
End: 2019-12-03

## 2019-12-10 ENCOUNTER — ANTI-COAG VISIT (OUTPATIENT)
Dept: PHARMACY | Age: 49
End: 2019-12-10
Payer: COMMERCIAL

## 2019-12-10 DIAGNOSIS — Z79.01 LONG TERM (CURRENT) USE OF ANTICOAGULANTS: ICD-10-CM

## 2019-12-10 DIAGNOSIS — I82.91 CHRONIC EMBOLISM AND THROMBOSIS OF UNSPECIFIED VEIN: ICD-10-CM

## 2019-12-10 LAB — INTERNATIONAL NORMALIZATION RATIO, POC: 2.9

## 2019-12-10 PROCEDURE — 99211 OFF/OP EST MAY X REQ PHY/QHP: CPT

## 2019-12-10 PROCEDURE — 85610 PROTHROMBIN TIME: CPT

## 2020-01-07 ENCOUNTER — TELEPHONE (OUTPATIENT)
Dept: PHARMACY | Age: 50
End: 2020-01-07

## 2020-01-07 NOTE — TELEPHONE ENCOUNTER
Patient left a vm requesting his appt for today be rescheduled to 1/9 about the same time. Patient has a new patient appt with Dr. Teri Amador that day. Left patient a vm stating I would reschedule him for 7:45a on 1/9, prior to his 8:20 appt @ Dr. Ana Paula Gualpla office. Asked him to call back if this date/time did not work for him.

## 2020-01-09 ENCOUNTER — OFFICE VISIT (OUTPATIENT)
Dept: INTERNAL MEDICINE CLINIC | Age: 50
End: 2020-01-09
Payer: COMMERCIAL

## 2020-01-09 ENCOUNTER — ANTI-COAG VISIT (OUTPATIENT)
Dept: PHARMACY | Age: 50
End: 2020-01-09
Payer: COMMERCIAL

## 2020-01-09 VITALS
HEIGHT: 65 IN | SYSTOLIC BLOOD PRESSURE: 120 MMHG | DIASTOLIC BLOOD PRESSURE: 74 MMHG | HEART RATE: 68 BPM | WEIGHT: 186 LBS | BODY MASS INDEX: 30.99 KG/M2

## 2020-01-09 LAB — INTERNATIONAL NORMALIZATION RATIO, POC: 2.3

## 2020-01-09 PROCEDURE — 1036F TOBACCO NON-USER: CPT | Performed by: INTERNAL MEDICINE

## 2020-01-09 PROCEDURE — G8427 DOCREV CUR MEDS BY ELIG CLIN: HCPCS | Performed by: INTERNAL MEDICINE

## 2020-01-09 PROCEDURE — 99204 OFFICE O/P NEW MOD 45 MIN: CPT | Performed by: INTERNAL MEDICINE

## 2020-01-09 PROCEDURE — 85610 PROTHROMBIN TIME: CPT

## 2020-01-09 PROCEDURE — G8417 CALC BMI ABV UP PARAM F/U: HCPCS | Performed by: INTERNAL MEDICINE

## 2020-01-09 PROCEDURE — G8484 FLU IMMUNIZE NO ADMIN: HCPCS | Performed by: INTERNAL MEDICINE

## 2020-01-09 PROCEDURE — 99211 OFF/OP EST MAY X REQ PHY/QHP: CPT

## 2020-01-09 ASSESSMENT — PATIENT HEALTH QUESTIONNAIRE - PHQ9
SUM OF ALL RESPONSES TO PHQ QUESTIONS 1-9: 0
2. FEELING DOWN, DEPRESSED OR HOPELESS: 0
SUM OF ALL RESPONSES TO PHQ QUESTIONS 1-9: 0
SUM OF ALL RESPONSES TO PHQ9 QUESTIONS 1 & 2: 0
1. LITTLE INTEREST OR PLEASURE IN DOING THINGS: 0
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
## 2020-01-21 ENCOUNTER — HOSPITAL ENCOUNTER (OUTPATIENT)
Age: 50
Discharge: HOME | End: 2020-01-21
Payer: COMMERCIAL

## 2020-01-21 LAB
A/G RATIO: 1.5 (ref 1.1–2.2)
ALBUMIN SERPL-MCNC: 4.4 G/DL (ref 3.4–5)
ALP BLD-CCNC: 92 U/L (ref 40–129)
ALT SERPL-CCNC: 22 U/L (ref 10–40)
ANION GAP SERPL CALCULATED.3IONS-SCNC: 14 MMOL/L (ref 3–16)
AST SERPL-CCNC: 19 U/L (ref 15–37)
BILIRUB SERPL-MCNC: 0.9 MG/DL (ref 0–1)
BUN BLDV-MCNC: 10 MG/DL (ref 7–20)
CALCIUM SERPL-MCNC: 9.6 MG/DL (ref 8.3–10.6)
CHLORIDE BLD-SCNC: 103 MMOL/L (ref 99–110)
CHOLESTEROL, TOTAL: 278 MG/DL (ref 0–199)
CO2: 23 MMOL/L (ref 21–32)
CREAT SERPL-MCNC: 1.1 MG/DL (ref 0.9–1.3)
GFR AFRICAN AMERICAN: >60
GFR NON-AFRICAN AMERICAN: >60
GLOBULIN: 2.9 G/DL
GLUCOSE BLD-MCNC: 113 MG/DL (ref 70–99)
HAV IGM SER IA-ACNC: NORMAL
HDLC SERPL-MCNC: 58 MG/DL (ref 40–60)
HEPATITIS B CORE IGM ANTIBODY: NORMAL
HEPATITIS B SURFACE ANTIGEN INTERPRETATION: NORMAL
HEPATITIS C ANTIBODY INTERPRETATION: NORMAL
LDL CHOLESTEROL CALCULATED: 187 MG/DL
POTASSIUM SERPL-SCNC: 3.9 MMOL/L (ref 3.5–5.1)
SODIUM BLD-SCNC: 140 MMOL/L (ref 136–145)
TOTAL PROTEIN: 7.3 G/DL (ref 6.4–8.2)
TRIGL SERPL-MCNC: 167 MG/DL (ref 0–150)
TSH REFLEX: 2.31 UIU/ML (ref 0.27–4.2)
VLDLC SERPL CALC-MCNC: 33 MG/DL

## 2020-01-21 PROCEDURE — 80053 COMPREHEN METABOLIC PANEL: CPT

## 2020-01-21 PROCEDURE — 80074 ACUTE HEPATITIS PANEL: CPT

## 2020-01-21 PROCEDURE — 84443 ASSAY THYROID STIM HORMONE: CPT

## 2020-01-21 PROCEDURE — 80061 LIPID PANEL: CPT

## 2020-01-21 PROCEDURE — 36415 COLL VENOUS BLD VENIPUNCTURE: CPT

## 2020-02-06 ENCOUNTER — TELEPHONE (OUTPATIENT)
Dept: PHARMACY | Age: 50
End: 2020-02-06

## 2020-02-06 NOTE — TELEPHONE ENCOUNTER
Pt has been taking Thera Flu for a few days- going to continue to take this and call his doctor if he doesn't feel better soon. We have him scheduled to RTC in 1 week.     Madhu Post, NathanielD, [facility]

## 2020-02-13 ENCOUNTER — ANTI-COAG VISIT (OUTPATIENT)
Dept: PHARMACY | Age: 50
End: 2020-02-13
Payer: COMMERCIAL

## 2020-02-13 LAB — INTERNATIONAL NORMALIZATION RATIO, POC: 4.1

## 2020-02-13 PROCEDURE — 85610 PROTHROMBIN TIME: CPT

## 2020-02-13 PROCEDURE — 99211 OFF/OP EST MAY X REQ PHY/QHP: CPT

## 2020-02-13 NOTE — PROGRESS NOTES
Mr. Juan Jose Neil is a 51 y.o. y/o male with history of pulmonary embolism with infarction. He has been taking warfarin since 2001   He presents today for anticoagulation monitoring and adjustment. Pertinent PMH: had a DVT and two pulmonary embolism. (+) hepatitis B (2/2016)  Patient Reported Findings:  Yes     No  [x]   []       Patient verifies current dosing regimen as listed- pt claims he takes one full tablet (7.5mg) daily and has never taken a half of a tablet in his life, when asked about this throughout the visit, he is VERY adamant about it and said that he sometimes self-adjusts for drinking but has not recently---> has been taking 1 tablet daily like he states he has been forever---> recites the dose change appropriately   []   [x]       S/S bleeding/bruising/swelling/SOB- denies   []   [x]       Blood in urine or stool- denies   []   [x]       Procedures scheduled in the future at this time  [x]   []       Missed Dose missed 2/5 d/t taking theraflu and not sure if interacted    [x]   []       Extra Dose took 7.5 mg for the past 2 sat to self adjust from missing dose   [x]   []       Change in medications -denies---> smoking weed ---> stopped smoking  Was taking theraflu, but has stopped   [x]   []       Change in health/diet/appetite-- Just recently returned to [employer] daily and states that he is back to gym so he is eating normal spinach --> states that he has been eating more spinach and kale now since in gym --> states that he has not been eating much vit k because stopped smoking and has not been going to gym as much. Plans to return to eating vit k    []   [x]       Change in alcohol use- Pt holds 1/2 dose of warfarin on nights he drinks.  Aware of the impact of alcohol  []   [x]       Change in activity   []   [x]       Hospital admission  []   [x]       Emergency department visit  []   [x]       Other complaints discussed DOACs in comparison to warfarin     Clinical Outcomes:  Yes     No   []   [x] Major bleeding event  []   [x]       Thromboembolic event  Takes warfarin in AM  Duration of warfarin Therapy: indefinitely  INR Range:  2.0-3.0  Cautious when holding warfarin--INR drops quickly     Patient is generally rarely in range. He fluctuates on his dose due to diet inconsistencies and alcohol. He self- doses and adjusts based on what he has been drinking. Spent a long time explaining to the patient the importance of consistency when on this medication- consistency with diet, alcohol and warfarin dose and the importance of following the AVS and not changing his dose without telling us. INR is 4.1 today d/t stopping vit k acutely, missing then adjusting dose of warfarin  Hold dose today then continue taking 3.75mg on Sat and 7.5mg all other days   Encouraged to maintain a consistency of vegetables/salads. Reminded him it is important to be consistent with his warfarin and to not self-adjust and to be consistent with his diet/alcohol.    Recheck INR in 2 weeks, 2/27    Referring is Dr. Teri Amador   INR (no units)   Date Value   02/13/2020 4.1   01/09/2020 2.3   12/10/2019 2.9   11/21/2019 4.2   09/20/2018 2.1   08/17/2018 1.9   07/06/2018 2   06/08/2018 3.2

## 2020-02-27 ENCOUNTER — TELEPHONE (OUTPATIENT)
Dept: PHARMACY | Age: 50
End: 2020-02-27

## 2020-03-04 ENCOUNTER — ANTI-COAG VISIT (OUTPATIENT)
Dept: PHARMACY | Age: 50
End: 2020-03-04
Payer: COMMERCIAL

## 2020-03-04 LAB — INTERNATIONAL NORMALIZATION RATIO, POC: 1.1

## 2020-03-04 PROCEDURE — 99211 OFF/OP EST MAY X REQ PHY/QHP: CPT

## 2020-03-04 PROCEDURE — 85610 PROTHROMBIN TIME: CPT

## 2020-03-04 NOTE — PROGRESS NOTES
Mr. Cecy Chowdhury is a 53 y.o. y/o male with history of pulmonary embolism with infarction. He has been taking warfarin since 2001   He presents today for anticoagulation monitoring and adjustment. Pertinent PMH: had a DVT and two pulmonary embolism. (+) hepatitis B (2/2016)  Patient Reported Findings:  Yes     No  [x]   []       Patient verifies current dosing regimen as listed- pt claims he takes one full tablet (7.5mg) daily and has never taken a half of a tablet in his life, when asked about this throughout the visit, he is VERY adamant about it and said that he sometimes self-adjusts for drinking but has not recently---> has been taking 1 tablet daily like he states he has been forever---> recites the dose change appropriately --> stated correct dose   []   [x]       S/S bleeding/bruising/swelling/SOB- states he observed \"bright red vomit\" once this past week but he is unsure if it was pizza sauce or blood. Denies any coffee-grounds like emesis  []   [x]       Blood in urine or stool- denies   []   [x]       Procedures scheduled in the future at this time  [x]   []       Missed Dose missed doses from 2/29 - 3/2 d/t \"kidney pain\"  [x]   []       Extra Dose   [x]   []       Change in medications -denies---> smoking weed ---> stopped smoking   [x]   []       Change in health/diet/appetite-- Just recently returned to [employer] daily and states that he is back to gym so he is eating normal spinach --> states that he has been eating more spinach and kale now since in gym --> states that he has not been eating much vit k because stopped smoking and has not been going to gym as much. Plans to return to eating vit k --> reports he is back in the gym and  has been having green smoothies daily and salad ~4x/week. (This has been his daily vit k intake for ~ 3 days now) plans to continue as such. []   [x]       Change in alcohol use- Pt holds 1/2 dose of warfarin on nights he drinks. Aware of the impact of alcohol.  States he has not had alcohol in about a month.   []   [x]       Change in activity   []   [x]       Hospital admission  []   [x]       Emergency department visit  []   [x]       Other complaints discussed DOACs in comparison to warfarin     Clinical Outcomes:  Yes     No   []   [x]       Major bleeding event  []   [x]       Thromboembolic event  Takes warfarin in AM  Duration of warfarin Therapy: indefinitely    INR Range:  2.0-3.0  Cautious when holding warfarin--INR drops quickly     Patient is generally rarely in range. He fluctuates on his dose due to diet inconsistencies and alcohol. He self- doses and adjusts based on what he has been drinking. Spent a long time explaining to the patient the importance of consistency when on this medication- consistency with diet, alcohol and warfarin dose and the importance of following the AVS and not changing his dose without telling us. INR is 1.1 today d/t increased vitamin K intake and missed doses. Reports he has missed doses for 3 days. D/t \"kidney pain\" - he states he drank too much pop (5 pepsis) and ate too much spicy food. Also experienced bright red vomiting x1 w/. Encouraged pt to f/u w/ PCP if \"kidney pain\" continues. As INR is subtherapeutic and pt w/ hx of chronic embolism , will boost x2 days. Take 11.25mg (1.5 tablets) Today and Tomorrow, Then continue taking 3.75mg on Sat and 7.5mg all other days. RTC in 5 days Mon 3/9     May need further dose adjustments if subtherapeutic at next visit d/t reported diet change. Encouraged to maintain a consistency of vegetables/salads. Reminded him it is important to be consistent with his warfarin and to not self-adjust and to be consistent with his diet/alcohol.    Recheck INR in 5 days, 3/9    Referring is Dr. Malu Gonzalez   INR (no units)   Date Value   03/04/2020 1.1   02/13/2020 4.1   01/09/2020 2.3   12/10/2019 2.9   09/20/2018 2.1   08/17/2018 1.9   07/06/2018 2   06/08/2018 3.2

## 2020-03-09 ENCOUNTER — TELEPHONE (OUTPATIENT)
Dept: PHARMACY | Age: 50
End: 2020-03-09

## 2020-03-09 NOTE — TELEPHONE ENCOUNTER
Patient requested a reschedule for Thurs. 3/12. Rescheduled him for 8:45a that day and left him a vm requesting a call back with confirmation this date/time works for him.

## 2020-03-12 ENCOUNTER — ANTI-COAG VISIT (OUTPATIENT)
Dept: PHARMACY | Age: 50
End: 2020-03-12
Payer: COMMERCIAL

## 2020-03-12 LAB — INTERNATIONAL NORMALIZATION RATIO, POC: 2.8

## 2020-03-12 PROCEDURE — 99211 OFF/OP EST MAY X REQ PHY/QHP: CPT

## 2020-03-12 PROCEDURE — 85610 PROTHROMBIN TIME: CPT

## 2020-03-12 NOTE — PROGRESS NOTES
Mr. Lyn Foster is a 53 y.o. y/o male with history of pulmonary embolism with infarction. He has been taking warfarin since 2001   He presents today for anticoagulation monitoring and adjustment. Pertinent PMH: had a DVT and two pulmonary embolism. (+) hepatitis B (2/2016)  Patient Reported Findings:  Yes     No  [x]   []       Patient verifies current dosing regimen as listed- stated correct dose   []   [x]       S/S bleeding/bruising/swelling/SOB- states he observed \"bright red vomit\" once this past week but he is unsure if it was pizza sauce or blood. Denies any coffee-grounds like emesis --> denies any since last visit   []   [x]       Blood in urine or stool- denies   []   [x]       Procedures scheduled in the future at this time  []   [x]       Missed Dose   []   [x]       Extra Dose   [x]   []       Change in medications -denies---> smoking weed ---> stopped smoking   [x]   []       Change in health/diet/appetite-- Just recently returned to [employer] daily and states that he is back to gym so he is eating normal spinach --> states that he has been eating more spinach and kale now since in gym --> states that he has not been eating much vit k because stopped smoking and has not been going to gym as much. Plans to return to eating vit k --> reports he is back in the gym and  has been having green smoothies daily and salad ~4x/week. (This has been his daily vit k intake for ~ 3 days now) plans to continue as such. []   [x]       Change in alcohol use- Pt holds 1/2 dose of warfarin on nights he drinks. Aware of the impact of alcohol.  States he has not had alcohol in about a month.   []   [x]       Change in activity   []   [x]       Hospital admission  []   [x]       Emergency department visit  []   [x]       Other complaints discussed DOACs in comparison to warfarin     Clinical Outcomes:  Yes     No   []   [x]       Major bleeding event  []   [x]       Thromboembolic event  Takes warfarin in AM  Duration of

## 2020-03-25 ENCOUNTER — TELEPHONE (OUTPATIENT)
Dept: PHARMACY | Age: 50
End: 2020-03-25

## 2020-03-25 NOTE — TELEPHONE ENCOUNTER
Called patient. Explained that we aren't seeing patients in the clinic until COVID-19 has passed then we will re-open. We are still asking patients to get their INRs checked by getting a lab draw at a designated lab ([facility]). We are asking that you get your lab drawn on the day of your appointment (time doesn't matter as long as you go during hours of operation), so please go sometime tomorrow to the lab to get your INR drawn. Then we will be contacting you via telephone with results and we will conduct the whole appointment over the telephone. Patient has selected [place] location to get his INR drawn.     Tobias Floyd, PharmD, [facility]

## 2020-04-01 NOTE — TELEPHONE ENCOUNTER
Patient did not make his 3/26 date for INR lab draw @ [facility].  Called & l/m for patient asking him to call and let us know when he would be able to go and get his INR done. We wanted to be sure that he still has an active order for the lab draw.

## 2020-04-03 NOTE — TELEPHONE ENCOUNTER
Spoke with patient. He said he \"just hasn't made it over there\". Patient plans on going on Thursday, 4/9.

## 2020-04-10 ENCOUNTER — TELEPHONE (OUTPATIENT)
Dept: PHARMACY | Age: 50
End: 2020-04-10

## 2020-04-10 NOTE — TELEPHONE ENCOUNTER
Pt left  last night saying he was unable to go to get his INR checked yesterday, 4/9. Would like to RS for next Thursday, 4/16. Left  saying he has been RS.      Seth Su, PharmD, [facility]

## 2020-04-17 ENCOUNTER — TELEPHONE (OUTPATIENT)
Dept: PHARMACY | Age: 50
End: 2020-04-17

## 2020-04-29 ENCOUNTER — TELEPHONE (OUTPATIENT)
Dept: PHARMACY | Age: 50
End: 2020-04-29

## 2020-04-29 NOTE — TELEPHONE ENCOUNTER
Warfarin prescription phoned into [facility] to [address] [facility] under Dr. Pat Varma  Warfarin 7.5 mg tabs  Take 3.75 mg (7.5 mg x 0.5) every Sat; 7.5 mg (7.5 mg x 1) all other days  90 days   2 refills  Jeremy Charlse, PharmD, [facility]

## 2020-05-06 ENCOUNTER — TELEPHONE (OUTPATIENT)
Dept: PHARMACY | Age: 50
End: 2020-05-06

## 2020-05-06 ENCOUNTER — ANTI-COAG VISIT (OUTPATIENT)
Dept: PHARMACY | Age: 50
End: 2020-05-06
Payer: COMMERCIAL

## 2020-05-06 LAB — INTERNATIONAL NORMALIZATION RATIO, POC: 1.5

## 2020-05-06 PROCEDURE — 99211 OFF/OP EST MAY X REQ PHY/QHP: CPT

## 2020-05-06 PROCEDURE — 85610 PROTHROMBIN TIME: CPT

## 2020-05-06 NOTE — PROGRESS NOTES
Mr. Dennie Shoulder is a 53 y.o. y/o male with history of pulmonary embolism with infarction. He has been taking warfarin since 2001   He presents today for anticoagulation monitoring and adjustment. Pertinent PMH: had a DVT and two pulmonary embolism. (+) hepatitis B (2/2016)  Patient Reported Findings:  Yes     No  [x]   []       Patient verifies current dosing regimen as listed- stated correct dose   []   [x]       S/S bleeding/bruising/swelling/SOB-   []   [x]       Blood in urine or stool- denies   []   [x]       Procedures scheduled in the future at this time  [x]   []       Missed Dose yesterday and took 3.75 mg on Mon d/t running out of warfarin   []   [x]       Extra Dose   []   [x]       Change in medications   [x]   []       Change in health/diet/appetite-- Just recently returned to [employer] daily and states that he is back to gym so he is eating normal spinach --> states that he has been eating more spinach and kale now since in gym --> states that he has not been eating much vit k because stopped smoking and has not been going to gym as much. Plans to return to eating vit k --> reports he is back in the gym and  has been having green smoothies daily and salad ~4x/week. (This has been his daily vit k intake for ~ 3 days now) plans to continue as such. --> has increased green intake to green smoothie twice a day   []   [x]       Change in alcohol use- Pt holds 1/2 dose of warfarin on nights he drinks. Aware of the impact of alcohol.  States he has not had alcohol in about a month.   []   [x]       Change in activity   []   [x]       Hospital admission  []   [x]       Emergency department visit  []   [x]       Other complaints discussed DOACs in comparison to warfarin     Clinical Outcomes:  Yes     No   []   [x]       Major bleeding event  []   [x]       Thromboembolic event  Takes warfarin in AM  Duration of warfarin Therapy: indefinitely    INR Range:  2.0-3.0  Cautious when holding warfarin--INR drops quickly     Patient is generally rarely in range. He fluctuates on his dose due to diet inconsistencies and alcohol. He self- doses and adjusts based on what he has been drinking. Pt has not been in the clinic in ~2 months. Explained the importance of being monitored on this medicine. INR is 1.5 today after missing doses and increasing vit k intake    Spoke to patient via telephone as part of drive thru program   Take 11.25 mg tonight then continue taking 3.75mg on Sat and 7.5mg all other days. Encouraged to maintain a consistency of vegetables/salads. Reminded him it is important to be consistent with his warfarin and to not self-adjust and to be consistent with his diet/alcohol.    Refilled warfarin at op pharmacy   Recheck INR in 2 weeks, 5/20    Referring is Dr. Enrique Garvey   INR (no units)   Date Value   05/06/2020 1.5   03/12/2020 2.8   03/04/2020 1.1   02/13/2020 4.1   09/20/2018 2.1   08/17/2018 1.9   07/06/2018 2   06/08/2018 3.2       CLINICAL PHARMACY CONSULT: MED RECONCILIATION/REVIEW ADDENDUM    For Pharmacy Admin Tracking Only    PHSO: No  Total # of Interventions Recommended: 2  - Increased Dose #: 1  - Refills Provided #: 1  - Maintenance Safety Lab Monitoring #: 1  Total Interventions Accepted: 2  Time Spent (min): 15    Nathaniel BaptisteD

## 2020-06-03 ENCOUNTER — TELEPHONE (OUTPATIENT)
Dept: PHARMACY | Age: 50
End: 2020-06-03

## 2020-06-09 ENCOUNTER — TELEPHONE (OUTPATIENT)
Dept: PHARMACY | Age: 50
End: 2020-06-09

## 2020-06-18 ENCOUNTER — ANTI-COAG VISIT (OUTPATIENT)
Dept: PHARMACY | Age: 50
End: 2020-06-18
Payer: COMMERCIAL

## 2020-06-18 VITALS — TEMPERATURE: 97.1 F

## 2020-06-18 LAB — INTERNATIONAL NORMALIZATION RATIO, POC: 3.1

## 2020-06-18 PROCEDURE — 99211 OFF/OP EST MAY X REQ PHY/QHP: CPT

## 2020-06-18 PROCEDURE — 85610 PROTHROMBIN TIME: CPT

## 2020-06-18 NOTE — PROGRESS NOTES
Mr. Jeffrey De Paz is a 53 y.o. y/o male with history of pulmonary embolism with infarction. He has been taking warfarin since 2001   He presents today for anticoagulation monitoring and adjustment. Pertinent PMH: had a DVT and two pulmonary embolism. (+) hepatitis B (2/2016)  Patient Reported Findings:  Yes     No  [x]   []       Patient verifies current dosing regimen as listed- stated correct dose   []   [x]       S/S bleeding/bruising/swelling/SOB- denies   []   [x]       Blood in urine or stool- denies   []   [x]       Procedures scheduled in the future at this time  [x]   []       Missed Dose denies   []   [x]       Extra Dose -not sure   []   [x]       Change in medications -denies   [x]   []       Change in health/diet/appetite-- Just recently returned to [employer] daily and states that he is back to gym so he is eating normal spinach --> states that he has been eating more spinach and kale now since in gym --> states that he has not been eating much vit k because stopped smoking and has not been going to gym as much. Plans to return to eating vit k --> reports he is back in the gym and  has been having green smoothies daily and salad ~4x/week. (This has been his daily vit k intake for ~ 3 days now) plans to continue as such. --> has increased green intake to green smoothie twice a day---> going back to normal diet    []   [x]       Change in alcohol use- Pt holds 1/2 dose of warfarin on nights he drinks. Aware of the impact of alcohol.  States he has not had alcohol in about a month. ---> nothing recently   []   [x]       Change in activity   []   [x]       Hospital admission  []   [x]       Emergency department visit  []   [x]       Other complaints discussed DOACs in comparison to warfarin     Clinical Outcomes:  Yes     No   []   [x]       Major bleeding event  []   [x]       Thromboembolic event  Takes warfarin in AM  Duration of warfarin Therapy: indefinitely    INR Range:  2.0-3.0  Cautious when holding warfarin--INR drops quickly     Patient is generally rarely in range. He fluctuates on his dose due to diet inconsistencies and alcohol. He self- doses and adjusts based on what he has been drinking. Pt was due back for an INR check in May. Explained the importance of being monitored on this medicine. INR is 3.1 today   Continue taking 3.75mg on Sat and 7.5mg all other days. Encouraged to maintain a consistency of vegetables/salads. Reminded him it is important to be consistent with his warfarin and to not self-adjust and to be consistent with his diet/alcohol.    Recheck INR in 3 weeks, 7/9    Referring is Dr. Enrique Garvey   INR (no units)   Date Value   05/06/2020 1.5   03/12/2020 2.8   03/04/2020 1.1   02/13/2020 4.1   09/20/2018 2.1   08/17/2018 1.9   07/06/2018 2   06/08/2018 3.2       CLINICAL PHARMACY CONSULT: MED RECONCILIATION/REVIEW ADDENDUM    For Pharmacy Admin Tracking Only    PHSO: No  Total # of Interventions Recommended: 0  - Maintenance Safety Lab Monitoring #: 1  Total Interventions Accepted: 0  Time Spent (min): Via Jermaine Pina, PharmD

## 2020-07-06 ENCOUNTER — TELEPHONE (OUTPATIENT)
Dept: INTERNAL MEDICINE CLINIC | Age: 50
End: 2020-07-06

## 2020-07-06 NOTE — TELEPHONE ENCOUNTER
[facility] received a call from:    Name of Caller: Pt     Relationship to patient: Self     Organization name:     Best contact [id number]      Reason for call: Pt needs to reschedule in office appt that was cancelled for .  Pls call pt

## 2020-07-09 ENCOUNTER — TELEPHONE (OUTPATIENT)
Dept: PHARMACY | Age: 50
End: 2020-07-09

## 2020-07-09 ENCOUNTER — APPOINTMENT (OUTPATIENT)
Dept: PHARMACY | Age: 50
End: 2020-07-09
Payer: COMMERCIAL

## 2020-07-09 NOTE — TELEPHONE ENCOUNTER
Patient vic maddie  requesting his appt be rescheduled to next Thurs. 7/23. Call and leave a message with the time.

## 2020-07-23 ENCOUNTER — ANTI-COAG VISIT (OUTPATIENT)
Dept: PHARMACY | Age: 50
End: 2020-07-23
Payer: COMMERCIAL

## 2020-07-23 VITALS — TEMPERATURE: 98.8 F

## 2020-07-23 LAB — INTERNATIONAL NORMALIZATION RATIO, POC: 3

## 2020-07-23 PROCEDURE — 99211 OFF/OP EST MAY X REQ PHY/QHP: CPT

## 2020-07-23 PROCEDURE — 85610 PROTHROMBIN TIME: CPT

## 2020-07-23 NOTE — PROGRESS NOTES
Mr. Aislinn Louie is a 53 y.o. y/o male with history of pulmonary embolism with infarction. He has been taking warfarin since 2001   He presents today for anticoagulation monitoring and adjustment. Pertinent PMH: had a DVT and two pulmonary embolism. (+) hepatitis B (2/2016)  Patient Reported Findings:  Yes     No  [x]   []       Patient verifies current dosing regimen as listed- stated correct dose   []   [x]       S/S bleeding/bruising/swelling/SOB- denies   []   [x]       Blood in urine or stool- denies   []   [x]       Procedures scheduled in the future at this time  [x]   []       Missed Dose denies   []   [x]       Extra Dose -not sure   []   [x]       Change in medications -denies   [x]   []       Change in health/diet/appetite-- Just recently returned to [employer] daily and states that he is back to gym so he is eating normal spinach --> states that he has been eating more spinach and kale now since in gym --> states that he has not been eating much vit k because stopped smoking and has not been going to gym as much. Plans to return to eating vit k --> reports he is back in the gym and  has been having green smoothies daily and salad ~4x/week. (This has been his daily vit k intake for ~ 3 days now) plans to continue as such. --> has increased green intake to green smoothie twice a day---> going back to normal diet---> no changes, no NVD    []   [x]       Change in alcohol use- Pt holds 1/2 dose of warfarin on nights he drinks. Aware of the impact of alcohol.  States he has not had alcohol in about a month. ---> nothing recently---> was drinking a lot last night    []   [x]       Change in activity   []   [x]       Hospital admission  []   [x]       Emergency department visit  []   [x]       Other complaints discussed DOACs in comparison to warfarin     Clinical Outcomes:  Yes     No   []   [x]       Major bleeding event  []   [x]       Thromboembolic event  Takes warfarin in AM  Duration of warfarin Therapy: indefinitely    INR Range:  2.0-3.0  Cautious when holding warfarin--INR drops quickly     Patient is generally rarely in range. He fluctuates on his dose due to diet inconsistencies and alcohol. He self- doses and adjusts based on what he has been drinking. INR is 3.0 today   Continue taking 3.75mg on Sat and 7.5mg all other days. Encouraged to maintain a consistency of vegetables/salads. Reminded him it is important to be consistent with his warfarin and to not self-adjust and to be consistent with his diet/alcohol.    Recheck INR in 4 weeks, 8/20    Referring is Dr. Felix Oliveros   INR (no units)   Date Value   07/23/2020 3.0   06/18/2020 3.1   05/06/2020 1.5   03/12/2020 2.8   09/20/2018 2.1   08/17/2018 1.9   07/06/2018 2   06/08/2018 3.2       CLINICAL PHARMACY CONSULT: MED RECONCILIATION/REVIEW ADDENDUM    For Pharmacy Admin Tracking Only    PHSO: No  Total # of Interventions Recommended: 0  - Maintenance Safety Lab Monitoring #: 1  Total Interventions Accepted: 0  Time Spent (min): Via Jermaine Pina, PharmD

## 2020-08-20 ENCOUNTER — TELEPHONE (OUTPATIENT)
Dept: PHARMACY | Age: 50
End: 2020-08-20

## 2020-08-27 ENCOUNTER — ANTI-COAG VISIT (OUTPATIENT)
Dept: PHARMACY | Age: 50
End: 2020-08-27
Payer: COMMERCIAL

## 2020-08-27 VITALS — TEMPERATURE: 97.8 F

## 2020-08-27 LAB — INTERNATIONAL NORMALIZATION RATIO, POC: 1.8

## 2020-08-27 PROCEDURE — 85610 PROTHROMBIN TIME: CPT

## 2020-08-27 PROCEDURE — 99211 OFF/OP EST MAY X REQ PHY/QHP: CPT

## 2020-08-27 NOTE — PROGRESS NOTES
Mr. Daksha Grant is a 53 y.o. y/o male with history of pulmonary embolism with infarction. He has been taking warfarin since 2001   He presents today for anticoagulation monitoring and adjustment. Pertinent PMH: had a DVT and two pulmonary embolism. (+) hepatitis B (2/2016)  Patient Reported Findings:  Yes     No  [x]   []       Patient verifies current dosing regimen as listed- stated correct dose   []   [x]       S/S bleeding/bruising/swelling/SOB- denies   []   [x]       Blood in urine or stool- denies   []   [x]       Procedures scheduled in the future at this time  [x]   []       Missed Dose missed the last 2 days    []   [x]       Extra Dose -not sure   []   [x]       Change in medications -denies   [x]   []       Change in health/diet/appetite-- Just recently returned to juicing daily and states that he is back to gym so he is eating normal spinach --> states that he has been eating more spinach and kale now since in gym --> states that he has not been eating much vit k because stopped smoking and has not been going to gym as much. Plans to return to eating vit k --> reports he is back in the gym and  has been having green smoothies daily and salad ~4x/week. (This has been his daily vit k intake for ~ 3 days now) plans to continue as such. --> has increased green intake to green smoothie twice a day---> going back to normal diet---> no changes, no NVD    []   [x]       Change in alcohol use- Pt holds 1/2 dose of warfarin on nights he drinks. Aware of the impact of alcohol.  States he has not had alcohol in about a month. ---> nothing recently---> was drinking a lot last night---> last week had some but not much recently     []   [x]       Change in activity   []   [x]       Hospital admission  []   [x]       Emergency department visit  []   [x]       Other complaints discussed DOACs in comparison to warfarin     Clinical Outcomes:  Yes     No   []   [x]       Major bleeding event  []   [x] Thromboembolic event  Takes warfarin in AM  Duration of warfarin Therapy: indefinitely    INR Range:  2.0-3.0  Cautious when holding warfarin--INR drops quickly     Patient is generally rarely in range. He fluctuates on his dose due to diet inconsistencies and alcohol. He self- doses and adjusts based on what he has been drinking. INR is 1.8 today dt missing the last 2 days of warfarin. Pt states ran out of meds and car was in shop so was unable to get them until today. Take 11.25mg tonight then continue taking 3.75mg on Sat and 7.5mg all other days. Encouraged to maintain a consistency of vegetables/salads. Reminded him it is important to be consistent with his warfarin and to not self-adjust and to be consistent with his diet/alcohol.    Recheck INR in 3 weeks, 9/17    Referring is Dr. Niecy Lance   INR (no units)   Date Value   07/23/2020 3.0   06/18/2020 3.1   05/06/2020 1.5   03/12/2020 2.8   09/20/2018 2.1   08/17/2018 1.9   07/06/2018 2   06/08/2018 3.2       CLINICAL PHARMACY CONSULT: MED RECONCILIATION/REVIEW ADDENDUM    For Pharmacy Admin Tracking Only    PHSO: Yes  Total # of Interventions Recommended: 1  - Increased Dose #: 1  - Maintenance Safety Lab Monitoring #: 1  Total Interventions Accepted: 1  Time Spent (min): [address], PharmD

## 2020-09-01 ENCOUNTER — OFFICE VISIT (OUTPATIENT)
Dept: INTERNAL MEDICINE CLINIC | Age: 50
End: 2020-09-01
Payer: COMMERCIAL

## 2020-09-01 VITALS
DIASTOLIC BLOOD PRESSURE: 80 MMHG | WEIGHT: 179 LBS | BODY MASS INDEX: 29.79 KG/M2 | OXYGEN SATURATION: 99 % | TEMPERATURE: 96.9 F | HEART RATE: 58 BPM | SYSTOLIC BLOOD PRESSURE: 138 MMHG

## 2020-09-01 PROCEDURE — 99213 OFFICE O/P EST LOW 20 MIN: CPT | Performed by: INTERNAL MEDICINE

## 2020-09-01 PROCEDURE — G8417 CALC BMI ABV UP PARAM F/U: HCPCS | Performed by: INTERNAL MEDICINE

## 2020-09-01 PROCEDURE — G8427 DOCREV CUR MEDS BY ELIG CLIN: HCPCS | Performed by: INTERNAL MEDICINE

## 2020-09-01 PROCEDURE — 1036F TOBACCO NON-USER: CPT | Performed by: INTERNAL MEDICINE

## 2020-09-01 RX ORDER — BUSPIRONE HYDROCHLORIDE 15 MG/1
15 TABLET ORAL 3 TIMES DAILY
Qty: 90 TABLET | Refills: 1 | Status: SHIPPED | OUTPATIENT
Start: 2020-09-01 | End: 2021-04-13

## 2020-09-01 ASSESSMENT — PATIENT HEALTH QUESTIONNAIRE - PHQ9
SUM OF ALL RESPONSES TO PHQ QUESTIONS 1-9: 0
1. LITTLE INTEREST OR PLEASURE IN DOING THINGS: 0
SUM OF ALL RESPONSES TO PHQ QUESTIONS 1-9: 0
2. FEELING DOWN, DEPRESSED OR HOPELESS: 0
SUM OF ALL RESPONSES TO PHQ9 QUESTIONS 1 & 2: 0
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
## 2020-09-24 ENCOUNTER — ANTI-COAG VISIT (OUTPATIENT)
Dept: PHARMACY | Age: 50
End: 2020-09-24
Payer: COMMERCIAL

## 2020-09-24 VITALS — TEMPERATURE: 99.1 F

## 2020-09-24 LAB — INTERNATIONAL NORMALIZATION RATIO, POC: 1.8

## 2020-09-24 PROCEDURE — 85610 PROTHROMBIN TIME: CPT

## 2020-09-24 PROCEDURE — 99211 OFF/OP EST MAY X REQ PHY/QHP: CPT

## 2020-10-08 ENCOUNTER — TELEPHONE (OUTPATIENT)
Dept: PHARMACY | Age: 50
End: 2020-10-08

## 2020-10-21 ENCOUNTER — ANTI-COAG VISIT (OUTPATIENT)
Dept: PHARMACY | Age: 50
End: 2020-10-21
Payer: COMMERCIAL

## 2020-10-21 VITALS — TEMPERATURE: 98.9 F

## 2020-10-21 LAB — INTERNATIONAL NORMALIZATION RATIO, POC: 4

## 2020-10-21 PROCEDURE — 85610 PROTHROMBIN TIME: CPT

## 2020-10-21 PROCEDURE — 99211 OFF/OP EST MAY X REQ PHY/QHP: CPT

## 2020-10-21 NOTE — PROGRESS NOTES
Mr. Anika Hernandez is a 53 y.o. y/o male with history of pulmonary embolism with infarction. He has been taking warfarin since 2001   He presents today for anticoagulation monitoring and adjustment. Pertinent PMH: had a DVT and two pulmonary e/mbolism. (+) hepatitis B (2/2016)  Patient Reported Findings:  Yes     No  [x]   []       Patient verifies current dosing regimen as listed- stated correct dose   []   [x]       S/S bleeding/bruising/swelling/SOB- denies   []   [x]       Blood in urine or stool- denies   []   [x]       Procedures scheduled in the future at this time  []   [x]       Missed Dose - none    [x]   []       Extra Dose -thinks he took an extra dose yesterday   []   [x]       Change in medications -denies, started centrum for men 9/19 (does contain vit K)   [x]   []       Change in health/diet/appetite-- reports he is back in the gym and  has been having green smoothies daily and sal/ad ~4x/week. (This has been his daily vit k intake for ~ 3 days now) plans to continue as such. --> has increased green intake to green smoothie twice a day---> going back to normal diet---> no changes, no NVD --> less greens than normal, patient possibly starting protein shakes with greens in 2 weeks. Ask patient if he has started at 10/8 visit.--->has not eaten any vit k foods in 2 weeks. No NVD  [x]   []       Change in alcohol use- Pt holds 1/2 dose of warfarin on nights he drinks. Aware of the impact of alcohol.  States he has not had alcohol in about a month. ---> nothing recently---> was drinking a lot last night---> last week had some but not much recently   --> some alcohol  9/22 ---> Drinks 1/2 of a fifth of alcohol every other weekend.   []   [x]       Change in activity   []   [x]       Hospital admission  []   [x]       Emergency department visit  []   [x]       Other complaints discussed DOACs in comparison to warfarin     Clinical Outcomes:  Yes     No   []   [x]       Major bleeding event  []   [x] Thromboembolic event  Takes warfarin in AM  Duration of warfarin Therapy: indefinitely    INR Range:  2.0-3.0  Cautious when holding warfarin--INR drops quickly     Patient is generally rarely in range. He fluctuates on his dose due to diet inconsistencies and alcohol. He self- doses and adjusts based on what he has been drinking. INR is 4.0 today likely due to cutting out greens and taking an extra dose yesterday. Patient was very worried about his supratherapeutic INR. Counseled patient on ways to prevent bleeds and how eating greens like spinach can affect his INR. Hold your dose tomorrow then continue taking 3.75mg on Sat and 7.5mg all other days. Encouraged to maintain a consistency of vegetables/salads. Reminded him it is important to be consistent with his warfarin and to not self-adjust and to be consistent with his diet/alcohol.    Recheck INR in 2 weeks, 11/4    Referring is Dr. Donte Tyler   INR (no units)   Date Value   09/24/2020 1.8   08/27/2020 1.8   07/23/2020 3.0   06/18/2020 3.1   09/20/2018 2.1   08/17/2018 1.9   07/06/2018 2   06/08/2018 3.2       CLINICAL PHARMACY CONSULT: MED RECONCILIATION/REVIEW ADDENDUM    For Pharmacy Admin Tracking Only    PHSO: Yes  Total # of Interventions Recommended: 1  - Decreased Dose #: 1  - Maintenance Safety Lab Monitoring #: 1  Total Interventions Accepted: 1  Time Spent (min): [address], PharmD

## 2020-11-05 ENCOUNTER — TELEPHONE (OUTPATIENT)
Dept: PHARMACY | Age: 50
End: 2020-11-05

## 2020-11-23 ENCOUNTER — TELEPHONE (OUTPATIENT)
Dept: PHARMACY | Age: 50
End: 2020-11-23

## 2020-12-01 ENCOUNTER — ANTI-COAG VISIT (OUTPATIENT)
Dept: PHARMACY | Age: 50
End: 2020-12-01
Payer: COMMERCIAL

## 2020-12-01 VITALS — TEMPERATURE: 98.7 F

## 2020-12-01 LAB — INTERNATIONAL NORMALIZATION RATIO, POC: 2.7

## 2020-12-01 PROCEDURE — 85610 PROTHROMBIN TIME: CPT

## 2020-12-01 PROCEDURE — 99211 OFF/OP EST MAY X REQ PHY/QHP: CPT

## 2020-12-01 NOTE — PROGRESS NOTES
Mr. Cecilia Kumar is a 53 y.o. y/o male with history of pulmonary embolism with infarction. He has been taking warfarin since 2001   He presents today for anticoagulation monitoring and adjustment. Pertinent PMH: had a DVT and two pulmonary e/mbolism. (+) hepatitis B (2/2016)  Patient Reported Findings:  Yes     No  [x]   []       Patient verifies current dosing regimen as listed- stated correct dose   []   [x]       S/S bleeding/bruising/swelling/SOB- denies   []   [x]       Blood in urine or stool- denies   []   [x]       Procedures scheduled in the future at this time  []   [x]       Missed Dose - none    [x]   []       Extra Dose -  []   [x]       Change in medications -denies, started centrum for men 9/19 (does contain vit K) --> no changes   [x]   []       Change in health/diet/appetite-- reports he is back in the gym and  has been having green smoothies daily and sal/ad ~4x/week. (This has been his daily vit k intake for ~ 3 days now) plans to continue as such. --> has increased green intake to green smoothie twice a day---> going back to normal diet---> no changes, no NVD --> less greens than normal, patient possibly starting protein shakes with greens in 2 weeks. Ask patient if he has started at 10/8 visit.--->has not eaten any vit k foods in 2 weeks. No NVD --> not as many greens lately --> no NVD  [x]   []       Change in alcohol use- Pt holds 1/2 dose of warfarin on nights he drinks. Aware of the impact of alcohol.  States he has not had alcohol in about a month. ---> nothing recently---> was drinking a lot last night---> last week had some but not much recently   --> some alcohol  9/22 ---> Drinks 1/2 of a fifth of alcohol every other weekend.   []   [x]       Change in activity   []   [x]       Hospital admission  []   [x]       Emergency department visit  []   [x]       Other complaints discussed DOACs in comparison to warfarin     Clinical Outcomes:  Yes     No   []   [x]       Major bleeding Breath sounds clear and equal bilaterally.

## 2020-12-22 ENCOUNTER — TELEPHONE (OUTPATIENT)
Dept: PHARMACY | Age: 50
End: 2020-12-22

## 2021-01-06 ENCOUNTER — ANTI-COAG VISIT (OUTPATIENT)
Dept: PHARMACY | Age: 51
End: 2021-01-06
Payer: COMMERCIAL

## 2021-01-06 DIAGNOSIS — I82.91 CHRONIC EMBOLISM AND THROMBOSIS OF UNSPECIFIED VEIN: ICD-10-CM

## 2021-01-06 DIAGNOSIS — Z79.01 LONG TERM (CURRENT) USE OF ANTICOAGULANTS: ICD-10-CM

## 2021-01-06 LAB — INTERNATIONAL NORMALIZATION RATIO, POC: 4.4

## 2021-01-06 PROCEDURE — 99211 OFF/OP EST MAY X REQ PHY/QHP: CPT

## 2021-01-06 PROCEDURE — 85610 PROTHROMBIN TIME: CPT

## 2021-01-06 NOTE — PROGRESS NOTES
Mr. Jo Ann Heck is a 53 y.o. y/o male with history of pulmonary embolism with infarction. He has been taking warfarin since 2001   He presents today for anticoagulation monitoring and adjustment. Pertinent PMH: had a DVT and two pulmonary e/mbolism. (+) hepatitis B (2/2016)  Patient Reported Findings:  Yes     No  [x]   []       Patient verifies current dosing regimen as listed- stated correct dose   []   [x]       S/S bleeding/bruising/swelling/SOB- denies   []   [x]       Blood in urine or stool- denies   []   [x]       Procedures scheduled in the future at this time  []   [x]       Missed Dose    [x]   [x]       Extra Dose -took 7.5mg on Sat and thinks he took 3.75mg Sun  []   [x]       Change in medications -denies, started centrum for men 9/19 (does contain vit K) --> no changes---> some tylenol last week    [x]   []       Change in health/diet/appetite-- reports he is back in the gym and  has been having green smoothies daily and sal/ad ~4x/week. (This has been his daily vit k intake for ~ 3 days now) plans to continue as such. --> has increased green intake to green smoothie twice a day---> going back to normal diet---> no changes, no NVD --> less greens than normal, patient possibly starting protein shakes with greens in 2 weeks. Ask patient if he has started at 10/8 visit.--->has not eaten any vit k foods in 2 weeks. No NVD --> not as many greens lately --> no NVD---> has not had his spinach drink the past 4 days   [x]   []       Change in alcohol use- Pt holds 1/2 dose of warfarin on nights he drinks. Aware of the impact of alcohol.  States he has not had alcohol in about a month. ---> nothing recently---> was drinking a lot last night---> last week had some but not much recently   --> some alcohol  9/22 ---> Drinks 1/2 of a fifth of alcohol every other weekend.---> some recently but nothing over weekend    []   [x]       Change in activity   []   [x]       Hospital admission []   [x]       Emergency department visit  []   [x]       Other complaints discussed DOACs in comparison to warfarin     Clinical Outcomes:  Yes     No   []   [x]       Major bleeding event  []   [x]       Thromboembolic event  Takes warfarin in AM  Duration of warfarin Therapy: indefinitely    INR Range:  2.0-3.0  Cautious when holding warfarin--INR drops quickly     Patient is generally rarely in range. He fluctuates on his dose due to diet inconsistencies and alcohol. He self- doses and adjusts based on what he has been drinking. Pt was due back 12/22. INR is 4.4 dt not having greens- will resume today. Since INR drops quickly when doses are held and already took today's dose, will half dose tomorrow. Take 3.75mg tomorrow then continue taking 3.75mg on Sat and 7.5mg all other days. Encouraged to maintain a consistency of vegetables/salads. Reminded him it is important to be consistent with his warfarin and to not self-adjust and to be consistent with his diet/alcohol.    Recheck INR in 2 weeks, 1/20    Referring is Dr. Muna Starkey   INR (no units)   Date Value   01/06/2021 4.4   12/01/2020 2.7   10/21/2020 4.0   09/24/2020 1.8   09/20/2018 2.1   08/17/2018 1.9   07/06/2018 2   06/08/2018 3.2       CLINICAL PHARMACY CONSULT: MED RECONCILIATION/REVIEW ADDENDUM    For Pharmacy Admin Tracking Only    PHSO: Yes  Total # of Interventions Recommended: 1  - Decreased Dose #: 1  - Maintenance Safety Lab Monitoring #: 1  Total Interventions Accepted: 1  Time Spent (min): [address] PharmD

## 2021-01-20 ENCOUNTER — TELEPHONE (OUTPATIENT)
Dept: PHARMACY | Age: 51
End: 2021-01-20

## 2021-01-20 NOTE — TELEPHONE ENCOUNTER
Pt left v/m asking to cancel [facility] appt today, asked to r/s on Fri or Tuesday. Lvm asking pt to call [facility] to confirm this appt.

## 2021-01-22 ENCOUNTER — ANTI-COAG VISIT (OUTPATIENT)
Dept: PHARMACY | Age: 51
End: 2021-01-22
Payer: COMMERCIAL

## 2021-01-22 VITALS — TEMPERATURE: 98 F

## 2021-01-22 DIAGNOSIS — I82.91 CHRONIC EMBOLISM AND THROMBOSIS OF UNSPECIFIED VEIN: ICD-10-CM

## 2021-01-22 DIAGNOSIS — Z79.01 LONG TERM (CURRENT) USE OF ANTICOAGULANTS: ICD-10-CM

## 2021-01-22 LAB — INTERNATIONAL NORMALIZATION RATIO, POC: 2.7

## 2021-01-22 PROCEDURE — 85610 PROTHROMBIN TIME: CPT

## 2021-01-22 PROCEDURE — 99211 OFF/OP EST MAY X REQ PHY/QHP: CPT

## 2021-01-22 NOTE — PROGRESS NOTES
Mr. Berta Banda is a 53 y.o. y/o male with history of pulmonary embolism with infarction. He has been taking warfarin since 2001   He presents today for anticoagulation monitoring and adjustment. Pertinent PMH: had a DVT and two pulmonary e/mbolism. (+) hepatitis B (2/2016)  Patient Reported Findings:  Yes     No  [x]   []       Patient verifies current dosing regimen as listed- stated correct dose   []   [x]       S/S bleeding/bruising/swelling/SOB- denies   []   [x]       Blood in urine or stool- denies   []   [x]       Procedures scheduled in the future at this time  []   [x]       Missed Dose    []   [x]       Extra Dose   []   [x]       Change in medications -denies, started centrum for men 9/19 (does contain vit K) --> no changes   [x]   []       Change in health/diet/appetite-- reports he is back in the gym and  has been having green smoothies daily and sal/ad ~4x/week. (This has been his daily vit k intake for ~ 3 days now) plans to continue as such. --> has increased green intake to green smoothie twice a day---> going back to normal diet---> no changes, no NVD --> less greens than normal, patient possibly starting protein shakes with greens in 2 weeks. Ask patient if he has started at 10/8 visit.--->has not eaten any vit k foods in 2 weeks. No NVD --> not as many greens lately --> has not had his spinach drink the past 4 days --> has returned to normal daily vit k   [x]   []       Change in alcohol use- Pt holds 1/2 dose of warfarin on nights he drinks. Aware of the impact of alcohol.  States he has not had alcohol in about a month. ---> nothing recently---> was drinking a lot last night---> last week had some but not much recently   --> some alcohol  9/22 ---> Drinks 1/2 of a fifth of alcohol every other weekend.---> some recently but nothing over weekend --> none recently    []   [x]       Change in activity   []   [x]       Hospital admission  []   [x]       Emergency department visit []   [x]       Other complaints discussed DOACs in comparison to warfarin     Clinical Outcomes:  Yes     No   []   [x]       Major bleeding event  []   [x]       Thromboembolic event  Takes warfarin in AM  Duration of warfarin Therapy: indefinitely    INR Range:  2.0-3.0  Cautious when holding warfarin--INR drops quickly     Patient is generally rarely in range. He fluctuates on his dose due to diet inconsistencies and alcohol. He self- doses and adjusts based on what he has been drinking. INR is 2.7 today   Continue taking 3.75mg on Sat and 7.5mg all other days. Encouraged to maintain a consistency of vegetables/salads. Reminded him it is important to be consistent with his warfarin and to not self-adjust and to be consistent with his diet/alcohol.    Recheck INR in 4 weeks, 2/19    Referring is Dr. Brigid Garland --> is going to get established with another md in same office   INR (no units)   Date Value   01/06/2021 4.4   12/01/2020 2.7   10/21/2020 4.0   09/24/2020 1.8   09/20/2018 2.1   08/17/2018 1.9   07/06/2018 2   06/08/2018 3.2       CLINICAL PHARMACY CONSULT: MED RECONCILIATION/REVIEW ADDENDUM    For Pharmacy Admin Tracking Only    PHSO: Yes  Total # of Interventions Recommended: 0  - Maintenance Safety Lab Monitoring #: 1  Total Interventions Accepted: 0  Time Spent (min): 15    Nathaniel SimpsonD

## 2021-02-23 ENCOUNTER — TELEPHONE (OUTPATIENT)
Dept: PHARMACY | Age: 51
End: 2021-02-23

## 2021-02-25 ENCOUNTER — TELEPHONE (OUTPATIENT)
Dept: PHARMACY | Age: 51
End: 2021-02-25

## 2021-03-03 ENCOUNTER — ANTI-COAG VISIT (OUTPATIENT)
Dept: PHARMACY | Age: 51
End: 2021-03-03
Payer: COMMERCIAL

## 2021-03-03 VITALS — TEMPERATURE: 96.4 F

## 2021-03-03 DIAGNOSIS — I82.91 CHRONIC EMBOLISM AND THROMBOSIS OF UNSPECIFIED VEIN: ICD-10-CM

## 2021-03-03 DIAGNOSIS — Z79.01 LONG TERM (CURRENT) USE OF ANTICOAGULANTS: ICD-10-CM

## 2021-03-03 LAB — INTERNATIONAL NORMALIZATION RATIO, POC: 3.1

## 2021-03-03 PROCEDURE — 85610 PROTHROMBIN TIME: CPT

## 2021-03-03 PROCEDURE — 99211 OFF/OP EST MAY X REQ PHY/QHP: CPT

## 2021-03-03 NOTE — PROGRESS NOTES
Mr. Gianni Douglass is a 49 y.o. y/o male with history of pulmonary embolism with infarction. He has been taking warfarin since 2001   He presents today for anticoagulation monitoring and adjustment. Pertinent PMH: had a DVT and two pulmonary e/mbolism. (+) hepatitis B (2/2016)  Patient Reported Findings:  Yes     No  [x]   []       Patient verifies current dosing regimen as listed- stated correct dose   [x]   []       S/S bleeding/bruising/swelling/SOB- bruise and sore spot on bag of leg, exercises regularly so may be sore muscle  []   [x]       Blood in urine or stool- denies   []   [x]       Procedures scheduled in the future at this time  []   [x]       Missed Dose    []   [x]       Extra Dose   []   [x]       Change in medications -denies, started centrum for men 9/19 (does contain vit K) --> no changes   []   [x]       Change in health/diet/appetite-- reports he is back in the gym and  has been having green smoothies daily and sal/ad ~4x/week. (This has been his daily vit k intake for ~ 3 days now) plans to continue as such. --> has increased green intake to green smoothie twice a day---> going back to normal diet---> no changes, no NVD --> less greens than normal, patient possibly starting protein shakes with greens in 2 weeks. Ask patient if he has started at 10/8 visit.--->has not eaten any vit k foods in 2 weeks. No NVD --> not as many greens lately --> has not had his spinach drink the past 4 days --> has returned to normal daily vit k   []   [x]       Change in alcohol use- Pt holds 1/2 dose of warfarin on nights he drinks. Aware of the impact of alcohol.  States he has not had alcohol in about a month. ---> nothing recently---> was drinking a lot last night---> last week had some but not much recently   --> some alcohol  9/22 ---> Drinks 1/2 of a fifth of alcohol every other weekend.---> some recently but nothing over weekend --> none recently    []   [x]       Change in activity   []   [x]       Hospital admission  []   [x]       Emergency department visit  [x]   []       Other complaints discussed DOACs in comparison to warfarin --> discussed there is no non-pharmacologic way to thin blood    Clinical Outcomes:  Yes     No   []   [x]       Major bleeding event  []   [x]       Thromboembolic event  Takes warfarin in AM  Duration of warfarin Therapy: indefinitely    INR Range:  2.0-3.0  Cautious when holding warfarin--INR drops quickly     Patient is generally rarely in range. He fluctuates on his dose due to diet inconsistencies and alcohol. He self- doses and adjusts based on what he has been drinking. INR is 3.1 today   Continue taking 3.75mg on Sat and 7.5mg all other days. Encouraged to maintain a consistency of vegetables/salads. Reminded him it is important to be consistent with his warfarin and to not self-adjust and to be consistent with his diet/alcohol.    Recheck INR in 4 weeks, 3/31    Referring is Dr. Jesus Mcbride --> has appt with Dr. Nydia Darling 3/4    INR (no units)   Date Value   03/03/2021 3.1   01/22/2021 2.7   01/06/2021 4.4   12/01/2020 2.7   09/20/2018 2.1   08/17/2018 1.9   07/06/2018 2   06/08/2018 3.2       CLINICAL PHARMACY CONSULT: MED RECONCILIATION/REVIEW ADDENDUM    For Pharmacy Admin Tracking Only    PHSO: Yes  Total # of Interventions Recommended: 0  - Maintenance Safety Lab Monitoring #: 1  Total Interventions Accepted: 0  Time Spent (min): [address], PharmD

## 2021-03-03 NOTE — TELEPHONE ENCOUNTER
Pt seen today in [address] [place] [address] . Will send referral to [facility]/t Dr Nhung meraring.

## 2021-03-24 NOTE — TELEPHONE ENCOUNTER
Sabine called from [facility] office. States that patient has a new appt with her on April 13, will not sign orders prior to having initial appt with her.

## 2021-03-31 ENCOUNTER — TELEPHONE (OUTPATIENT)
Dept: PHARMACY | Age: 51
End: 2021-03-31

## 2021-03-31 NOTE — TELEPHONE ENCOUNTER
Pt LVM asking to cancel appt today , would like to r/s next Wed 4/7 around the same time. R/s on 4/7 @ 8:00, pt will need to confirm this appt.

## 2021-04-05 NOTE — TELEPHONE ENCOUNTER
Pt MD retired and has appt with new provider next week but out of warfarin. OP pharmacy did emergency fill for 2 weeks so pt has enough to get to appt with new provider so new script can be called in. Pt ran out of warfarin on fri 4/1.

## 2021-04-05 NOTE — TELEPHONE ENCOUNTER
----- Message from Smith Aguirre sent at 4/5/2021  7:28 AM EDT -----  Regarding: Needs warfarin refill  Contact: Patient  Please call in warfarin refill to [facility] pharmacy. Patient will p/u today.

## 2021-04-13 ENCOUNTER — OFFICE VISIT (OUTPATIENT)
Dept: INTERNAL MEDICINE CLINIC | Age: 51
End: 2021-04-13
Payer: COMMERCIAL

## 2021-04-13 VITALS
HEIGHT: 65 IN | SYSTOLIC BLOOD PRESSURE: 138 MMHG | DIASTOLIC BLOOD PRESSURE: 80 MMHG | WEIGHT: 190.4 LBS | BODY MASS INDEX: 31.72 KG/M2 | HEART RATE: 50 BPM

## 2021-04-13 DIAGNOSIS — Z86.19 HISTORY OF HEPATITIS B: ICD-10-CM

## 2021-04-13 DIAGNOSIS — R97.20 ELEVATED PSA: ICD-10-CM

## 2021-04-13 DIAGNOSIS — Z13.220 SCREENING, LIPID: ICD-10-CM

## 2021-04-13 DIAGNOSIS — Z76.89 ENCOUNTER TO ESTABLISH CARE WITH NEW DOCTOR: Primary | ICD-10-CM

## 2021-04-13 DIAGNOSIS — Z79.01 LONG TERM (CURRENT) USE OF ANTICOAGULANTS: ICD-10-CM

## 2021-04-13 DIAGNOSIS — M79.2 NERVE PAIN: ICD-10-CM

## 2021-04-13 DIAGNOSIS — F12.10 MARIJUANA ABUSE: ICD-10-CM

## 2021-04-13 DIAGNOSIS — R22.9 SKIN NODULE: ICD-10-CM

## 2021-04-13 DIAGNOSIS — Z86.711 HISTORY OF PULMONARY EMBOLISM: ICD-10-CM

## 2021-04-13 DIAGNOSIS — Z12.11 SCREEN FOR COLON CANCER: ICD-10-CM

## 2021-04-13 PROCEDURE — G8417 CALC BMI ABV UP PARAM F/U: HCPCS | Performed by: NURSE PRACTITIONER

## 2021-04-13 PROCEDURE — 99215 OFFICE O/P EST HI 40 MIN: CPT | Performed by: NURSE PRACTITIONER

## 2021-04-13 PROCEDURE — G8427 DOCREV CUR MEDS BY ELIG CLIN: HCPCS | Performed by: NURSE PRACTITIONER

## 2021-04-13 PROCEDURE — 3017F COLORECTAL CA SCREEN DOC REV: CPT | Performed by: NURSE PRACTITIONER

## 2021-04-13 PROCEDURE — 1036F TOBACCO NON-USER: CPT | Performed by: NURSE PRACTITIONER

## 2021-04-13 SDOH — ECONOMIC STABILITY: FOOD INSECURITY: WITHIN THE PAST 12 MONTHS, THE FOOD YOU BOUGHT JUST DIDN'T LAST AND YOU DIDN'T HAVE MONEY TO GET MORE.: NEVER TRUE

## 2021-04-13 SDOH — ECONOMIC STABILITY: TRANSPORTATION INSECURITY
IN THE PAST 12 MONTHS, HAS THE LACK OF TRANSPORTATION KEPT YOU FROM MEDICAL APPOINTMENTS OR FROM GETTING MEDICATIONS?: NO

## 2021-04-13 SDOH — HEALTH STABILITY: MENTAL HEALTH: HOW MANY STANDARD DRINKS CONTAINING ALCOHOL DO YOU HAVE ON A TYPICAL DAY?: NOT ASKED

## 2021-04-13 SDOH — ECONOMIC STABILITY: TRANSPORTATION INSECURITY
IN THE PAST 12 MONTHS, HAS LACK OF TRANSPORTATION KEPT YOU FROM MEETINGS, WORK, OR FROM GETTING THINGS NEEDED FOR DAILY LIVING?: NO

## 2021-04-13 ASSESSMENT — ENCOUNTER SYMPTOMS
BACK PAIN: 0
ABDOMINAL PAIN: 0
NAUSEA: 0
SHORTNESS OF BREATH: 0
VOMITING: 0
DIARRHEA: 0
BLOOD IN STOOL: 0
WHEEZING: 0
CONSTIPATION: 0
COUGH: 0
SINUS PAIN: 0
SORE THROAT: 0

## 2021-04-13 ASSESSMENT — PATIENT HEALTH QUESTIONNAIRE - PHQ9: 1. LITTLE INTEREST OR PLEASURE IN DOING THINGS: 0
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
## 2021-04-15 ENCOUNTER — ANTI-COAG VISIT (OUTPATIENT)
Dept: PHARMACY | Age: 51
End: 2021-04-15
Payer: COMMERCIAL

## 2021-04-15 ENCOUNTER — HOSPITAL ENCOUNTER (OUTPATIENT)
Age: 51
Discharge: HOME | End: 2021-04-15
Payer: COMMERCIAL

## 2021-04-15 DIAGNOSIS — R73.9 ELEVATED BLOOD SUGAR: Primary | ICD-10-CM

## 2021-04-15 DIAGNOSIS — Z13.220 SCREENING, LIPID: ICD-10-CM

## 2021-04-15 DIAGNOSIS — I82.91 CHRONIC EMBOLISM AND THROMBOSIS OF UNSPECIFIED VEIN: ICD-10-CM

## 2021-04-15 DIAGNOSIS — R73.9 ELEVATED BLOOD SUGAR: ICD-10-CM

## 2021-04-15 DIAGNOSIS — Z79.01 LONG TERM (CURRENT) USE OF ANTICOAGULANTS: ICD-10-CM

## 2021-04-15 DIAGNOSIS — R97.20 ELEVATED PSA: ICD-10-CM

## 2021-04-15 DIAGNOSIS — Z86.711 HISTORY OF PULMONARY EMBOLISM: ICD-10-CM

## 2021-04-15 DIAGNOSIS — M79.2 NERVE PAIN: ICD-10-CM

## 2021-04-15 DIAGNOSIS — Z76.89 ENCOUNTER TO ESTABLISH CARE WITH NEW DOCTOR: ICD-10-CM

## 2021-04-15 LAB
A/G RATIO: 1.6 (ref 1.1–2.2)
ALBUMIN SERPL-MCNC: 4.2 G/DL (ref 3.4–5)
ALP BLD-CCNC: 96 U/L (ref 40–129)
ALT SERPL-CCNC: 27 U/L (ref 10–40)
ANION GAP SERPL CALCULATED.3IONS-SCNC: 13 MMOL/L (ref 3–16)
AST SERPL-CCNC: 23 U/L (ref 15–37)
BASOPHILS ABSOLUTE: 0 K/UL (ref 0–0.2)
BASOPHILS RELATIVE PERCENT: 0.8 %
BILIRUB SERPL-MCNC: 0.4 MG/DL (ref 0–1)
BUN BLDV-MCNC: 13 MG/DL (ref 7–20)
CALCIUM SERPL-MCNC: 9.3 MG/DL (ref 8.3–10.6)
CHLORIDE BLD-SCNC: 106 MMOL/L (ref 99–110)
CHOLESTEROL, FASTING: 312 MG/DL (ref 0–199)
CO2: 21 MMOL/L (ref 21–32)
CREAT SERPL-MCNC: 1.1 MG/DL (ref 0.9–1.3)
EOSINOPHILS ABSOLUTE: 0.1 K/UL (ref 0–0.6)
EOSINOPHILS RELATIVE PERCENT: 1.8 %
GFR AFRICAN AMERICAN: >60
GFR NON-AFRICAN AMERICAN: >60
GLOBULIN: 2.7 G/DL
GLUCOSE BLD-MCNC: 116 MG/DL (ref 70–99)
HCT VFR BLD CALC: 48.2 % (ref 40.5–52.5)
HDLC SERPL-MCNC: 57 MG/DL (ref 40–60)
HEMOGLOBIN: 16.5 G/DL (ref 13.5–17.5)
INR BLD: 4.43 (ref 0.86–1.14)
INTERNATIONAL NORMALIZATION RATIO, POC: 4.6
LDL CHOLESTEROL CALCULATED: 226 MG/DL
LYMPHOCYTES ABSOLUTE: 2.1 K/UL (ref 1–5.1)
LYMPHOCYTES RELATIVE PERCENT: 39.4 %
MCH RBC QN AUTO: 33.1 PG (ref 26–34)
MCHC RBC AUTO-ENTMCNC: 34.3 G/DL (ref 31–36)
MCV RBC AUTO: 96.6 FL (ref 80–100)
MONOCYTES ABSOLUTE: 0.5 K/UL (ref 0–1.3)
MONOCYTES RELATIVE PERCENT: 10 %
NEUTROPHILS ABSOLUTE: 2.5 K/UL (ref 1.7–7.7)
NEUTROPHILS RELATIVE PERCENT: 48 %
PDW BLD-RTO: 13.3 % (ref 12.4–15.4)
PLATELET # BLD: 186 K/UL (ref 135–450)
PMV BLD AUTO: 7.4 FL (ref 5–10.5)
POTASSIUM SERPL-SCNC: 4.1 MMOL/L (ref 3.5–5.1)
PROSTATE SPECIFIC ANTIGEN: 3.82 NG/ML (ref 0–4)
PROTHROMBIN TIME: 52.2 SEC (ref 10–13.2)
RBC # BLD: 4.99 M/UL (ref 4.2–5.9)
SODIUM BLD-SCNC: 140 MMOL/L (ref 136–145)
TOTAL PROTEIN: 6.9 G/DL (ref 6.4–8.2)
TRIGLYCERIDE, FASTING: 147 MG/DL (ref 0–150)
TSH REFLEX FT4: 1.11 UIU/ML (ref 0.27–4.2)
VLDLC SERPL CALC-MCNC: 29 MG/DL
WBC # BLD: 5.3 K/UL (ref 4–11)

## 2021-04-15 PROCEDURE — 84153 ASSAY OF PSA TOTAL: CPT

## 2021-04-15 PROCEDURE — 85025 COMPLETE CBC W/AUTO DIFF WBC: CPT

## 2021-04-15 PROCEDURE — 85610 PROTHROMBIN TIME: CPT

## 2021-04-15 PROCEDURE — 80061 LIPID PANEL: CPT

## 2021-04-15 PROCEDURE — 36415 COLL VENOUS BLD VENIPUNCTURE: CPT

## 2021-04-15 PROCEDURE — 99212 OFFICE O/P EST SF 10 MIN: CPT

## 2021-04-15 PROCEDURE — 82746 ASSAY OF FOLIC ACID SERUM: CPT

## 2021-04-15 PROCEDURE — 80053 COMPREHEN METABOLIC PANEL: CPT

## 2021-04-15 PROCEDURE — 82607 VITAMIN B-12: CPT

## 2021-04-15 PROCEDURE — 84443 ASSAY THYROID STIM HORMONE: CPT

## 2021-04-15 NOTE — PROGRESS NOTES
Mr. Gracy Arciniega is a 49 y.o. y/o male with history of pulmonary embolism with infarction. He has been taking warfarin since 2001   He presents today for anticoagulation monitoring and adjustment. Pertinent PMH: had a DVT and two pulmonary e/mbolism. (+) hepatitis B (2/2016)  Patient Reported Findings:  Yes     No  [x]   []       Patient verifies current dosing regimen as listed- stated correct dose   [x]   []       S/S bleeding/bruising/swelling/SOB- bruise and sore spot on bag of leg, exercises regularly so may be sore muscle---> denies   []   [x]       Blood in urine or stool- denies   []   [x]       Procedures scheduled in the future at this time- has to have colonoscopy and US in future   []   [x]       Missed Dose  -sat and sun, ran out of warfarin   []   [x]       Extra Dose -took extra dose on Monday   []   [x]       Change in medications -denies, started centrum for men 9/19 (does contain vit K) --> no changes   []   [x]       Change in health/diet/appetite-- reports he is back in the gym and  has been having green smoothies daily and sal/ad ~4x/week. (This has been his daily vit k intake for ~ 3 days now) plans to continue as such. --> has increased green intake to green smoothie twice a day---> going back to normal diet---> no changes, no NVD --> less greens than normal, patient possibly starting protein shakes with greens in 2 weeks. Ask patient if he has started at 10/8 visit.--->has not eaten any vit k foods in 2 weeks. No NVD --> not as many greens lately --> has not had his spinach drink the past 4 days --> has returned to normal daily vit k ---> no greens recently, will go back to normal, no NVD  []   [x]       Change in alcohol use- Pt holds 1/2 dose of warfarin on nights he drinks. Aware of the impact of alcohol.  States he has not had alcohol in about a month. ---> nothing recently---> was drinking a lot last night---> last week had some but not much recently   --> some alcohol  9/22 ---> Drinks

## 2021-04-16 LAB
ESTIMATED AVERAGE GLUCOSE: 108.3 MG/DL
FOLATE: >20 NG/ML (ref 4.78–24.2)
HBA1C MFR BLD: 5.4 %
VITAMIN B-12: 670 PG/ML (ref 211–911)

## 2021-04-23 ENCOUNTER — ANTI-COAG VISIT (OUTPATIENT)
Dept: PHARMACY | Age: 51
End: 2021-04-23
Payer: COMMERCIAL

## 2021-04-23 DIAGNOSIS — Z79.01 LONG TERM (CURRENT) USE OF ANTICOAGULANTS: ICD-10-CM

## 2021-04-23 DIAGNOSIS — I82.91 CHRONIC EMBOLISM AND THROMBOSIS OF UNSPECIFIED VEIN: ICD-10-CM

## 2021-04-23 LAB — INTERNATIONAL NORMALIZATION RATIO, POC: 1.6

## 2021-04-23 PROCEDURE — 85610 PROTHROMBIN TIME: CPT

## 2021-04-23 PROCEDURE — 99211 OFF/OP EST MAY X REQ PHY/QHP: CPT

## 2021-04-23 NOTE — PROGRESS NOTES
Mr. Demi Lewis is a 49 y.o. y/o male with history of pulmonary embolism with infarction. He has been taking warfarin since 2001   He presents today for anticoagulation monitoring and adjustment. Pertinent PMH: had a DVT and two pulmonary e/mbolism. (+) hepatitis B (2/2016)  Patient Reported Findings:  Yes     No  [x]   []       Patient verifies current dosing regimen as listed- stated correct dose   [x]   []       S/S bleeding/bruising/swelling/SOB- bruise and sore spot on bag of leg, exercises regularly so may be sore muscle---> denies   []   [x]       Blood in urine or stool- denies   []   [x]       Procedures scheduled in the future at this time- has to have colonoscopy and US in future   []   [x]       Missed Dose  -yesterday, half Wed   []   [x]       Extra Dose -denies   []   [x]       Change in medications -denies, started centrum for men 9/19 (does contain vit K) --> no changes   []   [x]       Change in health/diet/appetite-- reports he is back in the gym and  has been having green smoothies daily and sal/ad ~4x/week. (This has been his daily vit k intake for ~ 3 days now) plans to continue as such. --> has increased green intake to green smoothie twice a day---> going back to normal diet---> no changes, no NVD --> less greens than normal, patient possibly starting protein shakes with greens in 2 weeks. Ask patient if he has started at 10/8 visit.--->has not eaten any vit k foods in 2 weeks. No NVD --> not as many greens lately --> has not had his spinach drink the past 4 days --> has returned to normal daily vit k ---> no greens recently, will go back to normal, no NVD---> back to normal greens/drinks, no NVD  []   [x]       Change in alcohol use- Pt holds 1/2 dose of warfarin on nights he drinks. Aware of the impact of alcohol.  States he has not had alcohol in about a month. ---> nothing recently---> was drinking a lot last night---> last week had some but not much recently   --> some alcohol  9/22 ---> Drinks 1/2 of a fifth of alcohol every other weekend.---> some recently but nothing over weekend --> none recently    []   [x]       Change in activity   []   [x]       Hospital admission  []   [x]       Emergency department visit  [x]   []       Other complaints discussed DOACs in comparison to warfarin --> discussed there is no non-pharmacologic way to thin blood    Clinical Outcomes:  Yes     No   []   [x]       Major bleeding event  []   [x]       Thromboembolic event  Takes warfarin in AM  Duration of warfarin Therapy: indefinitely    INR Range:  2.0-3.0  Cautious when holding warfarin--INR drops quickly     Patient is generally rarely in range. He fluctuates on his dose due to diet inconsistencies and alcohol. He self- doses and adjusts based on what he has been drinking. INR is 1.6 today   Pt states ran out of warfarin again, was confused about how much he had left. Missed yesterday and took half tablet on Wed. Explained if runs out during the week needs to let us know so we can call and he can get it that day. Take 7.5mg tomorrow then continue taking 3.75mg on Sat and 7.5mg all other days   Encouraged to maintain a consistency of vegetables/salads. Reminded him it is important to be consistent with his warfarin and to not self-adjust and to be consistent with his diet/alcohol. RF called into [facility].    Recheck INR in 10 days, 5/5     Referring is Dr. Jazmine Wheeler - REF updated 4/15/21  INR (no units)   Date Value   04/23/2021 1.6   04/15/2021 4.43 (H)   04/15/2021 4.6   03/03/2021 3.1   01/22/2021 2.7   09/20/2018 2.1   08/17/2018 1.9   07/06/2018 2       CLINICAL PHARMACY CONSULT: MED RECONCILIATION/REVIEW ADDENDUM    For Pharmacy Admin Tracking Only    PHSO: Yes  Total # of Interventions Recommended: 2  - Increased Dose #: 1  - Refills Provided #: 1  - Maintenance Safety Lab Monitoring #: 1  Total Interventions Accepted: 2  Time Spent (min): Via Jermaine Pina PharmD

## 2021-05-05 ENCOUNTER — ANTI-COAG VISIT (OUTPATIENT)
Dept: PHARMACY | Age: 51
End: 2021-05-05
Payer: COMMERCIAL

## 2021-05-05 DIAGNOSIS — I82.91 CHRONIC EMBOLISM AND THROMBOSIS OF UNSPECIFIED VEIN: ICD-10-CM

## 2021-05-05 DIAGNOSIS — Z79.01 LONG TERM (CURRENT) USE OF ANTICOAGULANTS: ICD-10-CM

## 2021-05-05 LAB — INTERNATIONAL NORMALIZATION RATIO, POC: 4.9

## 2021-05-05 PROCEDURE — 85610 PROTHROMBIN TIME: CPT

## 2021-05-05 PROCEDURE — 99212 OFFICE O/P EST SF 10 MIN: CPT

## 2021-05-05 NOTE — PROGRESS NOTES
Mr. Evon Lee is a 49 y.o. y/o male with history of pulmonary embolism with infarction. He has been taking warfarin since 2001   He presents today for anticoagulation monitoring and adjustment. Pertinent PMH: had a DVT and two pulmonary e/mbolism. (+) hepatitis B (2/2016)  Patient Reported Findings:  Yes     No  [x]   []       Patient verifies current dosing regimen as listed- stated correct dose   []   [x]       S/S bleeding/bruising/swelling/SOB  []   [x]       Blood in urine or stool- denies   []   [x]       Procedures scheduled in the future at this time- possible colonoscopy, not yet scheduled. []   [x]       Missed Dose  -yesterday, half Wed   []   [x]       Extra Dose -denies   []   [x]       Change in medications -denies, started centrum for men 9/19 (does contain vit K) --> no changes --> No other medications. []   [x]       Change in health/diet/appetite-- reports he is back in the gym and  has been having green smoothies daily and sal/ad ~4x/week. (This has been his daily vit k intake for ~ 3 days now) plans to continue as such. --> has increased green intake to green smoothie twice a day---> going back to normal diet---> no changes, no NVD --> less greens than normal, patient possibly starting protein shakes with greens in 2 weeks. Ask patient if he has started at 10/8 visit.--->has not eaten any vit k foods in 2 weeks. No NVD --> not as many greens lately --> has not had his spinach drink the past 4 days --> has returned to normal daily vit k ---> no greens recently, will go back to normal, no NVD---> back to normal greens/drinks, no NVD ---> Eating all plant based diet. []   [x]       Change in alcohol use- Pt holds 1/2 dose of warfarin on nights he drinks. Aware of the impact of alcohol.  States he has not had alcohol in about a month. ---> nothing recently---> was drinking a lot last night---> last week had some but not much recently   --> some alcohol  9/22 ---> Drinks 1/2 of a fifth of alcohol every other weekend.---> some recently but nothing over weekend --> none recently    []   [x]       Change in activity   []   [x]       Hospital admission  []   [x]       Emergency department visit  [x]   []       Other complaints discussed DOACs in comparison to warfarin --> discussed there is no non-pharmacologic way to thin blood --->     Clinical Outcomes:  Yes     No   []   [x]       Major bleeding event  []   [x]       Thromboembolic event  Takes warfarin in AM  Duration of warfarin Therapy: indefinitely    INR Range:  2.0-3.0  Cautious when holding warfarin--INR drops quickly     Patient is generally rarely in range. He fluctuates on his dose due to diet inconsistencies and alcohol. He self- doses and adjusts based on what he has been drinking. INR is 4.9 today for unknown reason, patient reports not taking any extra and that he has increased vit K intake. Recommending patient pursue Xarelto with his PCP and discuss potential to switching over since he struggles to maintain consistency needed to maintain a therapeutic INR. Patient excited about potential to switch over but cost barrier discussed with patient. Do not take any warfarin for the next 2 days, then take 3.75 mg (1/2 tablet) on Friday, then continue taking 3.75mg on Sat and 7.5mg all other days     Encouraged to maintain a consistency of vegetables/salads. Reminded him it is important to be consistent with his warfarin and to not self-adjust and to be consistent with his diet/alcohol.      Recheck INR in 7 days, 5/12     Referring is Dr. Nicole Odonnell - REF updated 4/15/21  INR (no units)   Date Value   04/23/2021 1.6   04/15/2021 4.43 (H)   04/15/2021 4.6   03/03/2021 3.1   01/22/2021 2.7   09/20/2018 2.1   08/17/2018 1.9   07/06/2018 2       For Pharmacy Admin Tracking Only     Intervention Detail: Dose Adjustment: 1: reason: Interaction   Total # of Interventions Recommended: 1   Total # of Interventions Accepted: 1   Time Spent (min): 30

## 2021-05-12 ENCOUNTER — TELEPHONE (OUTPATIENT)
Dept: PHARMACY | Age: 51
End: 2021-05-12

## 2021-05-12 NOTE — TELEPHONE ENCOUNTER
Pt lvm asking to cancel [facility] appt today, wanted to r/s next Thursday. Lvm letting pt know I r/s him on 5/20 @ 1030 and asked that he call us back to confirm this appt time is ok.

## 2021-05-17 ENCOUNTER — HOSPITAL ENCOUNTER (OUTPATIENT)
Dept: ULTRASOUND IMAGING | Age: 51
Discharge: HOME | End: 2021-05-17
Payer: COMMERCIAL

## 2021-05-17 DIAGNOSIS — R22.9 SKIN NODULE: ICD-10-CM

## 2021-05-17 PROCEDURE — 76999 ECHO EXAMINATION PROCEDURE: CPT

## 2021-05-17 NOTE — TELEPHONE ENCOUNTER
Pt stopped in today didn't want to wait until 245 for appt.  Confirmed he would come in on 5/20 at 56

## 2021-05-20 ENCOUNTER — ANTI-COAG VISIT (OUTPATIENT)
Dept: PHARMACY | Age: 51
End: 2021-05-20
Payer: COMMERCIAL

## 2021-05-20 DIAGNOSIS — Z79.01 LONG TERM (CURRENT) USE OF ANTICOAGULANTS: Primary | ICD-10-CM

## 2021-05-20 DIAGNOSIS — I82.91 CHRONIC EMBOLISM AND THROMBOSIS OF UNSPECIFIED VEIN: ICD-10-CM

## 2021-05-20 LAB — INTERNATIONAL NORMALIZATION RATIO, POC: 1.5

## 2021-05-20 PROCEDURE — 99212 OFFICE O/P EST SF 10 MIN: CPT

## 2021-05-20 PROCEDURE — 85610 PROTHROMBIN TIME: CPT

## 2021-05-20 NOTE — PROGRESS NOTES
9/22 ---> Drinks 1/2 of a fifth of alcohol every other weekend.---> some recently but nothing over weekend --> none recently    []   [x]       Change in activity   []   [x]       Hospital admission  []   [x]       Emergency department visit  [x]   []       Other complaints discussed DOACs in comparison to warfarin --> discussed there is no non-pharmacologic way to thin blood --->     Clinical Outcomes:  Yes     No   []   [x]       Major bleeding event  []   [x]       Thromboembolic event  Takes warfarin in AM  Duration of warfarin Therapy: indefinitely    INR Range:  2.0-3.0  Cautious when holding warfarin--INR drops quickly     Patient is generally rarely in range. He fluctuates on his dose due to diet inconsistencies and alcohol. He self- doses and adjusts based on what he has been drinking. INR is 1.5 today d/t greens every day and missing dose. Explained need for consistency again    Take 11.25 mg tonight then continue taking 3.75mg on Sat and 7.5mg all other days   Encouraged to maintain a consistency of vegetables/salads. Reminded him it is important to be consistent with his warfarin and to not self-adjust and to be consistent with his diet/alcohol.    Recheck INR in 7 days, 5/27    Referring is Dr. Alfredo Gaspar - REF updated 4/15/21  INR (no units)   Date Value   05/05/2021 4.9   04/23/2021 1.6   04/15/2021 4.43 (H)   04/15/2021 4.6   03/03/2021 3.1   09/20/2018 2.1   08/17/2018 1.9   07/06/2018 2       For Pharmacy Admin Tracking Only     Intervention Detail: Dose Adjustment: 1: reason: Interaction   Total # of Interventions Recommended: 1   Total # of Interventions Accepted: 1   Time Spent (min): 15

## 2021-05-27 ENCOUNTER — TELEPHONE (OUTPATIENT)
Dept: PHARMACY | Age: 51
End: 2021-05-27

## 2021-06-01 ENCOUNTER — ANTI-COAG VISIT (OUTPATIENT)
Dept: PHARMACY | Age: 51
End: 2021-06-01
Payer: COMMERCIAL

## 2021-06-01 DIAGNOSIS — I82.91 CHRONIC EMBOLISM AND THROMBOSIS OF UNSPECIFIED VEIN: ICD-10-CM

## 2021-06-01 DIAGNOSIS — Z79.01 LONG TERM (CURRENT) USE OF ANTICOAGULANTS: Primary | ICD-10-CM

## 2021-06-01 LAB — INTERNATIONAL NORMALIZATION RATIO, POC: 2

## 2021-06-01 PROCEDURE — 85610 PROTHROMBIN TIME: CPT

## 2021-06-01 PROCEDURE — 99212 OFFICE O/P EST SF 10 MIN: CPT

## 2021-06-01 NOTE — PROGRESS NOTES
Mr. Rufino Harris is a 49 y.o. y/o male with history of pulmonary embolism with infarction. He has been taking warfarin since 2001   He presents today for anticoagulation monitoring and adjustment. Pertinent PMH: had a DVT and two pulmonary e/mbolism. (+) hepatitis B (2/2016)  Patient Reported Findings:  Yes     No  [x]   []       Patient verifies current dosing regimen as listed- stated correct dose   []   [x]       S/S bleeding/bruising/swelling/SOB  []   [x]       Blood in urine or stool- denies   []   [x]       Procedures scheduled in the future at this time- possible colonoscopy, not yet scheduled. []   [x]       Missed Dose  -denies   []   [x]       Extra Dose -denies   []   [x]       Change in medications -denies, started centrum for men 9/19 (does contain vit K) --> no changes --> No other medications. []   [x]       Change in health/diet/appetite-- reports he is back in the gym and  has been having green smoothies daily and sal/ad ~4x/week. (This has been his daily vit k intake for ~ 3 days now) plans to continue as such. --> has increased green intake to green smoothie twice a day---> going back to normal diet---> no changes, no NVD --> less greens than normal, patient possibly starting protein shakes with greens in 2 weeks. Ask patient if he has started at 10/8 visit.--->has not eaten any vit k foods in 2 weeks. No NVD --> not as many greens lately --> has not had his spinach drink the past 4 days --> has returned to normal daily vit k ---> no greens recently, will go back to normal, no NVD---> back to normal greens/drinks, no NVD ---> Eating all plant based diet. --> eating greens every day in juice --> having juice every morning and then serving of vit k every day as well   [x]   []       Change in alcohol use- Pt holds 1/2 dose of warfarin on nights he drinks. Aware of the impact of alcohol.  States he has not had alcohol in about a month. ---> nothing recently---> was drinking a lot last night---> last week had some but not much recently   --> some alcohol   ---> Drinks 1/2 of a fifth of alcohol every other weekend.---> some recently but nothing over weekend --> states that he had large amount of alcohol over weekend, 3-4 shots    []   [x]       Change in activity   []   [x]       Hospital admission  []   [x]       Emergency department visit  [x]   []       Other complaints discussed DOACs in comparison to warfarin --> discussed there is no non-pharmacologic way to thin blood --->     Clinical Outcomes:  Yes     No   []   [x]       Major bleeding event  []   [x]       Thromboembolic event  Takes warfarin in AM  Duration of warfarin Therapy: indefinitely    INR Range:  2.0-3.0  Cautious when holding warfarin--INR drops quickly     Patient is generally rarely in range. He fluctuates on his dose due to diet inconsistencies and alcohol. He self- doses and adjusts based on what he has been drinking. INR is 2 today after not missing doses, large amount of alcohol and eating greens every days   Since plans to continue eating more vit k, will increase weekly dose to 7.5 mg daily (7% inc)  Encouraged to maintain a consistency of vegetables/salads. Reminded him it is important to be consistent with his warfarin and to not self-adjust and to be consistent with his diet/alcohol.    Recheck INR in 10 days, 6/10    Referring is Dr. Rose Marie Zafar - REF updated 4/15/21  INR (no units)   Date Value   2021 1.5   2021 4.9   2021 1.6   04/15/2021 4.43 (H)   04/15/2021 4.6   2018 2.1   2018 1.9   2018 2       For Pharmacy Admin Tracking Only     Intervention Detail: Adherence Monitorin and Dose Adjustment: 1: reason: Therapy Optimization   Total # of Interventions Recommended: 2   Total # of Interventions Accepted: 2   Time Spent (min): 15

## 2021-06-10 ENCOUNTER — TELEPHONE (OUTPATIENT)
Dept: PHARMACY | Age: 51
End: 2021-06-10

## 2021-06-10 ENCOUNTER — APPOINTMENT (OUTPATIENT)
Dept: PHARMACY | Age: 51
End: 2021-06-10
Payer: COMMERCIAL

## 2021-06-10 NOTE — TELEPHONE ENCOUNTER
Patient LVM asking to be rescheduled. He did not say what day he wanted to be rescheduled. Need to call back to reschedule.

## 2021-06-22 NOTE — TELEPHONE ENCOUNTER
Patient LVM requesting appt on Thursday, 6/24. Rescheduled for 8am that day and LVM for the patient with appt date/time.

## 2021-06-24 ENCOUNTER — ANTI-COAG VISIT (OUTPATIENT)
Dept: PHARMACY | Age: 51
End: 2021-06-24
Payer: COMMERCIAL

## 2021-06-24 DIAGNOSIS — I82.91 CHRONIC EMBOLISM AND THROMBOSIS OF UNSPECIFIED VEIN: ICD-10-CM

## 2021-06-24 DIAGNOSIS — Z79.01 LONG TERM (CURRENT) USE OF ANTICOAGULANTS: Primary | ICD-10-CM

## 2021-06-24 LAB — INTERNATIONAL NORMALIZATION RATIO, POC: 2.8

## 2021-06-24 PROCEDURE — 99211 OFF/OP EST MAY X REQ PHY/QHP: CPT

## 2021-06-24 PROCEDURE — 85610 PROTHROMBIN TIME: CPT

## 2021-06-24 NOTE — PROGRESS NOTES
Mr. Ginger Garvey is a 49 y.o. y/o male with history of pulmonary embolism with infarction. He has been taking warfarin since 2001   He presents today for anticoagulation monitoring and adjustment. Pertinent PMH: had a DVT and two pulmonary e/mbolism. (+) hepatitis B (2/2016)  Patient Reported Findings:  Yes     No  []   [x]       Patient verifies current dosing regimen as listed- did not increase dose as instructed at last visit   []   [x]       S/S bleeding/bruising/swelling/SOB  []   [x]       Blood in urine or stool- denies   []   [x]       Procedures scheduled in the future at this time- possible colonoscopy, not yet scheduled. []   [x]       Missed Dose  -denies   []   [x]       Extra Dose -denies   []   [x]       Change in medications -denies, started centrum for men 9/19 (does contain vit K) --> no changes   [x]   []       Change in health/diet/appetite-- reports he is back in the gym and  has been having green smoothies daily and sal/ad ~4x/week. (This has been his daily vit k intake for ~ 3 days now) plans to continue as such. --> has increased green intake to green smoothie twice a day---> going back to normal diet---> no changes, no NVD --> less greens than normal, patient possibly starting protein shakes with greens in 2 weeks. Ask patient if he has started at 10/8 visit.--->has not eaten any vit k foods in 2 weeks. No NVD --> not as many greens lately --> has not had his spinach drink the past 4 days --> has returned to normal daily vit k ---> no greens recently, will go back to normal, no NVD---> back to normal greens/drinks, no NVD ---> Eating all plant based diet. --> eating greens every day in juice --> having juice every morning and then serving of vit k every day as well --> no changes   [x]   []       Change in alcohol use- Pt holds 1/2 dose of warfarin on nights he drinks. Aware of the impact of alcohol.  States he has not had alcohol in about a month. ---> nothing recently---> was drinking a lot last night---> last week had some but not much recently   --> some alcohol   ---> Drinks 1/2 of a fifth of alcohol every other weekend.---> some recently but nothing over weekend --> states that he had large amount of alcohol over weekend, 3-4 shots --> alcohol 4-5 days ago     []   [x]       Change in activity   []   [x]       Hospital admission  []   [x]       Emergency department visit  [x]   []       Other complaints discussed DOACs in comparison to warfarin --> discussed there is no non-pharmacologic way to thin blood --->     Clinical Outcomes:  Yes     No   []   [x]       Major bleeding event  []   [x]       Thromboembolic event  Takes warfarin in AM  Duration of warfarin Therapy: indefinitely    INR Range:  2.0-3.0  Cautious when holding warfarin--INR drops quickly     Patient is generally rarely in range. He fluctuates on his dose due to diet inconsistencies and alcohol. He self- doses and adjusts based on what he has been drinking. INR is 2.8 today after not missing doses, large amount of alcohol and eating greens every days --> continues   Continue dose of 3.75 mg on Sat and 7.5 mg all other days of the week   Encouraged to maintain a consistency of vegetables/salads. Reminded him it is important to be consistent with his warfarin and to not self-adjust and to be consistent with his diet/alcohol.    Recheck INR in 2 weeks,     Referring is Dr. Aung Wahl - REF updated 4/15/21  INR (no units)   Date Value   2021 2   2021 1.5   2021 4.9   2021 1.6   04/15/2021 4.43 (H)   2018 2.1   2018 1.9   2018 2       For Pharmacy Admin Tracking Only     Intervention Detail: Adherence Monitorin   Total # of Interventions Recommended: 1   Total # of Interventions Accepted: 1   Time Spent (min): 15

## 2021-07-08 ENCOUNTER — TELEPHONE (OUTPATIENT)
Dept: PHARMACY | Age: 51
End: 2021-07-08

## 2021-07-08 NOTE — TELEPHONE ENCOUNTER
Left voicemail for patient that (per his request) his appt w/[facility] was changed to next Thursday 7/15 @ 8:15am

## 2021-07-15 NOTE — TELEPHONE ENCOUNTER
Patient LVM stating he was still camping and requested a reschedule to 7/27 or 7/29. Rescheduled patient to 7/27.

## 2021-07-29 ENCOUNTER — ANTI-COAG VISIT (OUTPATIENT)
Dept: PHARMACY | Age: 51
End: 2021-07-29
Payer: COMMERCIAL

## 2021-07-29 DIAGNOSIS — Z79.01 LONG TERM (CURRENT) USE OF ANTICOAGULANTS: Primary | ICD-10-CM

## 2021-07-29 DIAGNOSIS — I82.91 CHRONIC EMBOLISM AND THROMBOSIS OF UNSPECIFIED VEIN: ICD-10-CM

## 2021-07-29 LAB — INTERNATIONAL NORMALIZATION RATIO, POC: 3.1

## 2021-07-29 PROCEDURE — 99211 OFF/OP EST MAY X REQ PHY/QHP: CPT

## 2021-07-29 PROCEDURE — 85610 PROTHROMBIN TIME: CPT

## 2021-07-29 NOTE — PROGRESS NOTES
Mr. Alfonso Rogers is a 49 y.o. y/o male with history of pulmonary embolism with infarction. He has been taking warfarin since 2001   He presents today for anticoagulation monitoring and adjustment. Pertinent PMH: had a DVT and two pulmonary e/mbolism. (+) hepatitis B (2/2016)  Patient Reported Findings:  Yes     No  [x]   []       Patient verifies current dosing regimen as listed  []   [x]       S/S bleeding/bruising/swelling/SOB  []   [x]       Blood in urine or stool- denies   []   [x]       Procedures scheduled in the future at this time- possible colonoscopy, not yet scheduled. [x]   []       Missed Dose  -missed 5 doses for the past 3 weeks d/t camping   []   [x]       Extra Dose -denies   []   [x]       Change in medications -denies, started centrum for men 9/19 (does contain vit K) --> no changes   [x]   []       Change in health/diet/appetite-- reports he is back in the gym and  has been having green smoothies daily and sal/ad ~4x/week. (This has been his daily vit k intake for ~ 3 days now) plans to continue as such. --> has increased green intake to green smoothie twice a day---> going back to normal diet---> no changes, no NVD --> less greens than normal, patient possibly starting protein shakes with greens in 2 weeks. Ask patient if he has started at 10/8 visit.--->has not eaten any vit k foods in 2 weeks. No NVD --> not as many greens lately --> has not had his spinach drink the past 4 days --> has returned to normal daily vit k ---> no greens recently, will go back to normal, no NVD---> back to normal greens/drinks, no NVD ---> Eating all plant based diet. --> eating greens every day in juice --> having juice every morning and then serving of vit k every day as well --> no changes -->had not been eating greens for 3 weeks   [x]   []       Change in alcohol use- Pt holds 1/2 dose of warfarin on nights he drinks. Aware of the impact of alcohol.  States he has not had alcohol in about a month. ---> nothing recently---> was drinking a lot last night---> last week had some but not much recently   --> some alcohol  9/22 ---> Drinks 1/2 of a fifth of alcohol every other weekend.---> some recently but nothing over weekend --> states that he had large amount of alcohol over weekend, 3-4 shots --> alcohol 4-5 days ago --> inc EtOH d/t camping. Drink 2 days ago. []   [x]       Change in activity   []   [x]       Hospital admission  []   [x]       Emergency department visit  [x]   []       Other complaints discussed DOACs in comparison to warfarin --> discussed there is no non-pharmacologic way to thin blood     Clinical Outcomes:  Yes     No   []   [x]       Major bleeding event  []   [x]       Thromboembolic event  Takes warfarin in AM  Duration of warfarin Therapy: indefinitely    INR Range:  2.0-3.0  Cautious when holding warfarin--INR drops quickly     Patient is generally rarely in range. He fluctuates on his dose due to diet inconsistencies and alcohol. He self- doses and adjusts based on what he has been drinking. INR is 3.1 today after missing 5 doses, inc alcohol drinking, and not eating greens. Take 3.75 mg tonight then continue dose of 3.75 mg on Sat and 7.5 mg all other days of the week   Encouraged to maintain a consistency of vegetables/salads. Reminded him it is important to be consistent with his warfarin and to not self-adjust and to be consistent with his diet/alcohol.    Recheck INR in 2 weeks, 8/12    Referring is Dr. Mccain Lawrence - REF updated 4/15/21  INR (no units)   Date Value   06/24/2021 2.8   06/01/2021 2   05/20/2021 1.5   05/05/2021 4.9   04/15/2021 4.43 (H)   09/20/2018 2.1   08/17/2018 1.9   07/06/2018 2       For Pharmacy Admin Tracking Only     Intervention Detail: Dose Adjustment: 1, reason: Therapy Optimization   Total # of Interventions Recommended: 1   Total # of Interventions Accepted: 1   Time Spent (min): 15

## 2021-08-12 ENCOUNTER — TELEPHONE (OUTPATIENT)
Dept: PHARMACY | Age: 51
End: 2021-08-12

## 2021-08-12 NOTE — TELEPHONE ENCOUNTER
Patient LVM requesting his appt be rescheduled to next Wed, around the same time. Rescheduled for @Wed, 8/18 @ 8:15am.  Need to call patient to confirm.

## 2021-08-18 ENCOUNTER — ANTI-COAG VISIT (OUTPATIENT)
Dept: PHARMACY | Age: 51
End: 2021-08-18
Payer: COMMERCIAL

## 2021-08-18 DIAGNOSIS — Z79.01 LONG TERM (CURRENT) USE OF ANTICOAGULANTS: Primary | ICD-10-CM

## 2021-08-18 DIAGNOSIS — I82.91 CHRONIC EMBOLISM AND THROMBOSIS OF UNSPECIFIED VEIN: ICD-10-CM

## 2021-08-18 LAB — INTERNATIONAL NORMALIZATION RATIO, POC: 2.2

## 2021-08-18 PROCEDURE — 99211 OFF/OP EST MAY X REQ PHY/QHP: CPT

## 2021-08-18 PROCEDURE — 85610 PROTHROMBIN TIME: CPT

## 2021-08-18 NOTE — PROGRESS NOTES
Mr. Terri Hanley is a 49 y.o. y/o male with history of pulmonary embolism with infarction. He has been taking warfarin since 2001   He presents today for anticoagulation monitoring and adjustment. Pertinent PMH: had a DVT and two pulmonary e/mbolism. (+) hepatitis B (2/2016)  Patient Reported Findings:  Yes     No  [x]   []       Patient verifies current dosing regimen as listed  []   [x]       S/S bleeding/bruising/swelling/SOB  []   [x]       Blood in urine or stool- denies   []   [x]       Procedures scheduled in the future at this time- possible colonoscopy, not yet scheduled. [x]   []       Missed Dose  -missed 5 doses for the past 3 weeks d/t camping   []   [x]       Extra Dose -denies   []   [x]       Change in medications -denies, started centrum for men 9/19 (does contain vit K) --> no changes   [x]   []       Change in health/diet/appetite-- reports he is back in the gym and  has been having green smoothies daily and sal/ad ~4x/week. (This has been his daily vit k intake for ~ 3 days now) plans to continue as such. --> has increased green intake to green smoothie twice a day---> going back to normal diet---> no changes, no NVD --> less greens than normal, patient possibly starting protein shakes with greens in 2 weeks. Ask patient if he has started at 10/8 visit.--->has not eaten any vit k foods in 2 weeks. No NVD --> not as many greens lately --> has not had his spinach drink the past 4 days --> has returned to normal daily vit k ---> no greens recently, will go back to normal, no NVD---> back to normal greens/drinks, no NVD ---> Eating all plant based diet. --> eating greens every day in juice --> having juice every morning and then serving of vit k every day as well --> no changes -->had not been eating greens for 3 weeks --> no changes, no NVD   [x]   []       Change in alcohol use- Pt holds 1/2 dose of warfarin on nights he drinks. Aware of the impact of alcohol.  States he has not had alcohol in about a month. ---> nothing recently---> was drinking a lot last night---> last week had some but not much recently   --> some alcohol  9/22 ---> Drinks 1/2 of a fifth of alcohol every other weekend.---> some recently but nothing over weekend --> states that he had large amount of alcohol over weekend, 3-4 shots --> alcohol 4-5 days ago --> inc EtOH d/t camping. Drink 2 days ago. --> none   []   [x]       Change in activity   []   [x]       Hospital admission  []   [x]       Emergency department visit  [x]   []       Other complaints discussed DOACs in comparison to warfarin --> discussed there is no non-pharmacologic way to thin blood     Clinical Outcomes:  Yes     No   []   [x]       Major bleeding event  []   [x]       Thromboembolic event  Takes warfarin in AM  Duration of warfarin Therapy: indefinitely    INR Range:  2.0-3.0  Cautious when holding warfarin--INR drops quickly     Patient is generally rarely in range. He fluctuates on his dose due to diet inconsistencies and alcohol. He self- doses and adjusts based on what he has been drinking. INR is 2.2 today  Patient's diet has stabilized and no missed doses in last week. Will continue current dose and check again in 2 weeks to see if continues to be in range then may push out appointments. Continue dose of 3.75 mg on Sat and 7.5 mg all other days of the week   Encouraged to maintain a consistency of vegetables/salads. May need refill at next appt.    Recheck INR in 2 weeks, 9/1    Referring is Dr. Cristina Olguin - REF updated 4/15/21  INR (no units)   Date Value   08/18/2021 2.2   07/29/2021 3.1   06/24/2021 2.8   06/01/2021 2   04/15/2021 4.43 (H)   09/20/2018 2.1   08/17/2018 1.9   07/06/2018 2       For Pharmacy Admin Tracking Only     Total # of Interventions Recommended: 0   Total # of Interventions Accepted: 0   Time Spent (min): 15

## 2021-09-01 ENCOUNTER — APPOINTMENT (OUTPATIENT)
Dept: PHARMACY | Age: 51
End: 2021-09-01
Payer: COMMERCIAL

## 2021-09-09 ENCOUNTER — APPOINTMENT (OUTPATIENT)
Dept: PHARMACY | Age: 51
End: 2021-09-09
Payer: COMMERCIAL

## 2021-09-09 ENCOUNTER — TELEPHONE (OUTPATIENT)
Dept: PHARMACY | Age: 51
End: 2021-09-09

## 2021-09-09 NOTE — TELEPHONE ENCOUNTER
Pt lvm asking to cancel [facility] appt today , would like to r/s same time next Wednesday. Lvm letting pt know I r/s him on 9/15 @ 9:30 , we did not have a 9:45 available. Asked pt to call back to confirm.

## 2021-09-15 ENCOUNTER — ANTI-COAG VISIT (OUTPATIENT)
Dept: PHARMACY | Age: 51
End: 2021-09-15
Payer: COMMERCIAL

## 2021-09-15 DIAGNOSIS — Z79.01 LONG TERM (CURRENT) USE OF ANTICOAGULANTS: Primary | ICD-10-CM

## 2021-09-15 DIAGNOSIS — I82.91 CHRONIC EMBOLISM AND THROMBOSIS OF UNSPECIFIED VEIN: ICD-10-CM

## 2021-09-15 LAB — INTERNATIONAL NORMALIZATION RATIO, POC: 2.3

## 2021-09-15 PROCEDURE — 99211 OFF/OP EST MAY X REQ PHY/QHP: CPT

## 2021-09-15 PROCEDURE — 85610 PROTHROMBIN TIME: CPT

## 2021-09-15 NOTE — PROGRESS NOTES
Mr. Tatum Turner is a 49 y.o. y/o male with history of pulmonary embolism with infarction. He has been taking warfarin since 2001   He presents today for anticoagulation monitoring and adjustment. Pertinent PMH: had a DVT and two pulmonary e/mbolism. (+) hepatitis B (2/2016)  Patient Reported Findings:  Yes     No  [x]   []       Patient verifies current dosing regimen as listed  []   [x]       S/S bleeding/bruising/swelling/SOB  []   [x]       Blood in urine or stool- denies   []   [x]       Procedures scheduled in the future at this time- possible colonoscopy, not yet scheduled. []   [x]       Missed Dose  -denies   []   [x]       Extra Dose -denies   []   [x]       Change in medications -denies, started centrum for men 9/19 (does contain vit K) --> no changes   []   [x]       Change in health/diet/appetite-- reports he is back in the gym and  has been having green smoothies daily and sal/ad ~4x/week. (This has been his daily vit k intake for ~ 3 days now) plans to continue as such. --> has increased green intake to green smoothie twice a day---> going back to normal diet---> no changes, no NVD --> less greens than normal, patient possibly starting protein shakes with greens in 2 weeks. Ask patient if he has started at 10/8 visit.--->has not eaten any vit k foods in 2 weeks. No NVD --> not as many greens lately --> has not had his spinach drink the past 4 days --> has returned to normal daily vit k ---> no greens recently, will go back to normal, no NVD---> back to normal greens/drinks, no NVD ---> Eating all plant based diet. --> eating greens every day in juice --> having juice every morning and then serving of vit k every day as well --> no changes -->had not been eating greens for 3 weeks --> no changes, no NVD   []   [x]       Change in alcohol use- Pt holds 1/2 dose of warfarin on nights he drinks. Aware of the impact of alcohol.  States he has not had alcohol in about a month. ---> nothing recently---> was drinking a lot last night---> last week had some but not much recently   --> some alcohol  9/22 ---> Drinks 1/2 of a fifth of alcohol every other weekend.---> some recently but nothing over weekend --> states that he had large amount of alcohol over weekend, 3-4 shots --> alcohol 4-5 days ago --> inc EtOH d/t camping. Drink 2 days ago. --> none   []   [x]       Change in activity   []   [x]       Hospital admission  []   [x]       Emergency department visit  [x]   []       Other complaints discussed DOACs in comparison to warfarin --> discussed there is no non-pharmacologic way to thin blood     Clinical Outcomes:  Yes     No   []   [x]       Major bleeding event  []   [x]       Thromboembolic event  Takes warfarin in AM  Duration of warfarin Therapy: indefinitely    INR Range:  2.0-3.0  Cautious when holding warfarin--INR drops quickly     Patient is generally rarely in range. He fluctuates on his dose due to diet inconsistencies and alcohol. He self- doses and adjusts based on what he has been drinking. INR is 2.3 today  Patient's diet has stabilized and no missed doses in last week. Will continue current dose and check again in 2 weeks to see if continues to be in range then may push out appointments. Continue dose of 3.75 mg on Sat and 7.5 mg all other days of the week   Encouraged to maintain a consistency of vegetables/salads.   Recheck INR in 3 weeks, 10/6    Referring is Dr. Nicolas Mccracken - REF updated 4/15/21  INR (no units)   Date Value   08/18/2021 2.2   07/29/2021 3.1   06/24/2021 2.8   06/01/2021 2   04/15/2021 4.43 (H)   09/20/2018 2.1   08/17/2018 1.9   07/06/2018 2       For Pharmacy Admin Tracking Only     Total # of Interventions Recommended: 0   Total # of Interventions Accepted: 0   Time Spent (min): 15

## 2021-10-06 ENCOUNTER — TELEPHONE (OUTPATIENT)
Dept: PHARMACY | Age: 51
End: 2021-10-06

## 2021-10-20 ENCOUNTER — ANTI-COAG VISIT (OUTPATIENT)
Dept: PHARMACY | Age: 51
End: 2021-10-20
Payer: COMMERCIAL

## 2021-10-20 DIAGNOSIS — Z79.01 LONG TERM (CURRENT) USE OF ANTICOAGULANTS: Primary | ICD-10-CM

## 2021-10-20 DIAGNOSIS — I82.91 CHRONIC EMBOLISM AND THROMBOSIS OF UNSPECIFIED VEIN: ICD-10-CM

## 2021-10-20 LAB — INTERNATIONAL NORMALIZATION RATIO, POC: 2.3

## 2021-10-20 PROCEDURE — 85610 PROTHROMBIN TIME: CPT

## 2021-10-20 PROCEDURE — 99211 OFF/OP EST MAY X REQ PHY/QHP: CPT

## 2021-10-20 NOTE — PROGRESS NOTES
Mr. Shantel Macario is a 49 y.o. y/o male with history of pulmonary embolism with infarction. He has been taking warfarin since 2001   He presents today for anticoagulation monitoring and adjustment. Pertinent PMH: had a DVT and two pulmonary e/mbolism. (+) hepatitis B (2/2016)  Patient Reported Findings:  Yes     No  [x]   []       Patient verifies current dosing regimen as listed  []   [x]       S/S bleeding/bruising/swelling/SOB  []   [x]       Blood in urine or stool- denies   []   [x]       Procedures scheduled in the future at this time- possible colonoscopy, not yet scheduled --> no schedule procedure at this time  []   [x]       Missed Dose  -denies   []   [x]       Extra Dose -denies   []   [x]       Change in medications -denies, started centrum for men 9/19 (does contain vit K) --> no changes   []   [x]       Change in health/diet/appetite-- reports he is back in the gym and  has been having green smoothies daily and sal/ad ~4x/week. (This has been his daily vit k intake for ~ 3 days now) plans to continue as such. --> has increased green intake to green smoothie twice a day---> going back to normal diet---> no changes, no NVD --> less greens than normal, patient possibly starting protein shakes with greens in 2 weeks. Ask patient if he has started at 10/8 visit.--->has not eaten any vit k foods in 2 weeks. No NVD --> not as many greens lately --> has not had his spinach drink the past 4 days --> has returned to normal daily vit k ---> no greens recently, will go back to normal, no NVD---> back to normal greens/drinks, no NVD ---> Eating all plant based diet. --> eating greens every day in juice --> having juice every morning and then serving of vit k every day as well --> no changes -->had not been eating greens for 3 weeks --> no changes, no NVD   []   [x]       Change in alcohol use- Pt holds 1/2 dose of warfarin on nights he drinks. Aware of the impact of alcohol.  States he has not had alcohol in about a month. ---> nothing recently---> was drinking a lot last night---> last week had some but not much recently   --> some alcohol  9/22 ---> Drinks 1/2 of a fifth of alcohol every other weekend.---> some recently but nothing over weekend --> states that he had large amount of alcohol over weekend, 3-4 shots --> alcohol 4-5 days ago --> inc EtOH d/t camping. Drink 2 days ago. --> none --> had partied over the weekend but was driving so didn't drink as much   []   [x]       Change in activity   []   [x]       Hospital admission  []   [x]       Emergency department visit  [x]   []       Other complaints discussed DOACs in comparison to warfarin --> discussed there is no non-pharmacologic way to thin blood     Clinical Outcomes:  Yes     No   []   [x]       Major bleeding event  []   [x]       Thromboembolic event  Takes warfarin in AM  Duration of warfarin Therapy: indefinitely    INR Range:  2.0-3.0  Cautious when holding warfarin--INR drops quickly     Patient is generally rarely in range. He fluctuates on his dose due to diet inconsistencies and alcohol. He self- doses and adjusts based on what he has been drinking. INR is 2.3 today  Continue dose of 3.75 mg on Sat and 7.5 mg all other days of the week   Encouraged to maintain a consistency of vegetables/salads.   Recheck INR in 3 weeks, 11/10  Will push out appt if INR within range at the next visit     Referring is Dr. Carmine Bolton - REF updated 4/15/21  INR (no units)   Date Value   09/15/2021 2.3   08/18/2021 2.2   07/29/2021 3.1   06/24/2021 2.8   04/15/2021 4.43 (H)   09/20/2018 2.1   08/17/2018 1.9   07/06/2018 2       For Pharmacy Admin Tracking Only     Total # of Interventions Recommended: 0   Total # of Interventions Accepted: 0   Time Spent (min): 15

## 2021-11-10 ENCOUNTER — TELEPHONE (OUTPATIENT)
Dept: PHARMACY | Age: 51
End: 2021-11-10

## 2021-11-10 NOTE — TELEPHONE ENCOUNTER
Pt LVM asking to cancel [facility] appt today, would like to r/s for next Wed around the same time.  R/s on 11/17@ 9:15 pt will need to confirm this appt

## 2021-11-17 ENCOUNTER — ANTI-COAG VISIT (OUTPATIENT)
Dept: PHARMACY | Age: 51
End: 2021-11-17
Payer: COMMERCIAL

## 2021-11-17 DIAGNOSIS — Z79.01 LONG TERM (CURRENT) USE OF ANTICOAGULANTS: Primary | ICD-10-CM

## 2021-11-17 DIAGNOSIS — I82.91 CHRONIC EMBOLISM AND THROMBOSIS OF UNSPECIFIED VEIN: ICD-10-CM

## 2021-11-17 LAB — INTERNATIONAL NORMALIZATION RATIO, POC: 4

## 2021-11-17 PROCEDURE — 85610 PROTHROMBIN TIME: CPT

## 2021-11-17 PROCEDURE — 99211 OFF/OP EST MAY X REQ PHY/QHP: CPT

## 2021-11-17 NOTE — PROGRESS NOTES
Mr. Bassem Wang is a 49 y.o. y/o male with history of pulmonary embolism with infarction. He has been taking warfarin since 2001   He presents today for anticoagulation monitoring and adjustment. Pertinent PMH: had a DVT and two pulmonary e/mbolism. (+) hepatitis B (2/2016)  Patient Reported Findings:  Yes     No  [x]   []       Patient verifies current dosing regimen as listed  []   [x]       S/S bleeding/bruising/swelling/SOB  []   [x]       Blood in urine or stool- denies   []   [x]       Procedures scheduled in the future at this time- possible colonoscopy, not yet scheduled --> no schedule procedure at this time --> denies   []   [x]       Missed Dose  -denies   []   [x]       Extra Dose -denies   []   [x]       Change in medications -denies, started centrum for men 9/19 (does contain vit K) --> no changes --> took Theraflu on Tues, Wed, Thur of last week   []   [x]       Change in health/diet/appetite-- reports he is back in the gym and  has been having green smoothies daily and sal/ad ~4x/week. (This has been his daily vit k intake for ~ 3 days now) plans to continue as such. --> has increased green intake to green smoothie twice a day---> going back to normal diet---> no changes, no NVD --> less greens than normal, patient possibly starting protein shakes with greens in 2 weeks. Ask patient if he has started at 10/8 visit.--->has not eaten any vit k foods in 2 weeks. No NVD --> not as many greens lately --> has not had his spinach drink the past 4 days --> has returned to normal daily vit k ---> no greens recently, will go back to normal, no NVD---> back to normal greens/drinks, no NVD ---> Eating all plant based diet.  --> eating greens every day in juice --> having juice every morning and then serving of vit k every day as well --> no changes -->had not been eating greens for 3 weeks --> no changes, no NVD --> back to gym, will be having greens daily now   []   [x]       Change in alcohol use- Pt holds 1/2 dose of warfarin on nights he drinks. Aware of the impact of alcohol. States he has not had alcohol in about a month. ---> nothing recently---> was drinking a lot last night---> last week had some but not much recently   --> some alcohol  9/22 ---> Drinks 1/2 of a fifth of alcohol every other weekend.---> some recently but nothing over weekend --> states that he had large amount of alcohol over weekend, 3-4 shots --> alcohol 4-5 days ago --> inc EtOH d/t camping. Drink 2 days ago. --> none --> had partied over the weekend but was driving so didn't drink as much --> drink on Friday   []   [x]       Change in activity  --> back to gym now   []   [x]       Hospital admission  []   [x]       Emergency department visit  [x]   []       Other complaints discussed DOACs in comparison to warfarin --> discussed there is no non-pharmacologic way to thin blood      Clinical Outcomes:  Yes     No   []   [x]       Major bleeding event  []   [x]       Thromboembolic event  Takes warfarin in AM  Duration of warfarin Therapy: indefinitely    INR Range:  2.0-3.0  Cautious when holding warfarin--INR drops quickly     Patient is generally rarely in range. He fluctuates on his dose due to diet inconsistencies and alcohol. He self- doses and adjusts based on what he has been drinking. INR is 4.0 today after drinking on Friday and taking Theraflu x3 days last week. Skip dose tonight (11/17/21) and then continue dose of 3.75 mg on Sat and 7.5 mg all other days of the week   Encouraged to maintain a consistency of vegetables/salads.   Recheck INR in 2 weeks, 12/1 to assess for new routine of eating green once daily (when going to the gym) and adjust weekly dose as needed      * Consent form signed on 11/17*    Referring is Dr. Pascual Madrid - REF updated 4/15/21  INR (no units)   Date Value   10/20/2021 2.3   09/15/2021 2.3   08/18/2021 2.2   07/29/2021 3.1   04/15/2021 4.43 (H)   09/20/2018 2.1   08/17/2018 1.9   07/06/2018 2 For Pharmacy Admin Tracking Only     Intervention Detail: Dose Adjustment: 1, reason: Therapy Optimization   Total # of Interventions Recommended: 1   Total # of Interventions Accepted: 1   Time Spent (min): [address], PharmD    PGY1 Pharmacy Resident   [id number]

## 2021-12-01 ENCOUNTER — TELEPHONE (OUTPATIENT)
Dept: PHARMACY | Age: 51
End: 2021-12-01

## 2021-12-08 NOTE — TELEPHONE ENCOUNTER
Pt lvm stating he cant make it in today, wanted to r/s same time next week. LVM stating we did not have anything available on 12/15 , r/s on 12/16 at 9:15 asked pt to call clinic to confirm this date and time works for him.

## 2021-12-16 ENCOUNTER — ANTI-COAG VISIT (OUTPATIENT)
Dept: PHARMACY | Age: 51
End: 2021-12-16
Payer: COMMERCIAL

## 2021-12-16 DIAGNOSIS — Z79.01 LONG TERM (CURRENT) USE OF ANTICOAGULANTS: Primary | ICD-10-CM

## 2021-12-16 DIAGNOSIS — I82.91 CHRONIC EMBOLISM AND THROMBOSIS OF UNSPECIFIED VEIN: ICD-10-CM

## 2021-12-16 LAB — INTERNATIONAL NORMALIZATION RATIO, POC: 2.3

## 2021-12-16 PROCEDURE — 99211 OFF/OP EST MAY X REQ PHY/QHP: CPT

## 2021-12-16 PROCEDURE — 85610 PROTHROMBIN TIME: CPT

## 2021-12-16 NOTE — PROGRESS NOTES
Mr. Bassem Wang is a 49 y.o. y/o male with history of pulmonary embolism with infarction. He has been taking warfarin since 2001   He presents today for anticoagulation monitoring and adjustment. Pertinent PMH: had a DVT and two pulmonary e/mbolism. (+) hepatitis B (2/2016)  Patient Reported Findings:  Yes     No  [x]   []       Patient verifies current dosing regimen as listed  []   [x]       S/S bleeding/bruising/swelling/SOB  []   [x]       Blood in urine or stool- denies   []   [x]       Procedures scheduled in the future at this time- possible colonoscopy, not yet scheduled --> no schedule procedure at this time --> denies   []   [x]       Missed Dose  -denies   []   [x]       Extra Dose -denies   [x]   []       Change in medications -denies, started centrum for men 9/19 (does contain vit K) --> no changes --> took Theraflu on Tues, Wed, Thur of last week --> had tylenol the last few days   [x]   []       Change in health/diet/appetite-- reports he is back in the gym and  has been having green smoothies daily and sal/ad ~4x/week. (This has been his daily vit k intake for ~ 3 days now) plans to continue as such. --> has increased green intake to green smoothie twice a day---> going back to normal diet---> no changes, no NVD --> less greens than normal, patient possibly starting protein shakes with greens in 2 weeks. Ask patient if he has started at 10/8 visit.--->has not eaten any vit k foods in 2 weeks. No NVD --> not as many greens lately --> has not had his spinach drink the past 4 days --> has returned to normal daily vit k ---> no greens recently, will go back to normal, no NVD---> back to normal greens/drinks, no NVD ---> Eating all plant based diet.  --> eating greens every day in juice --> having juice every morning and then serving of vit k every day as well --> no changes -->had not been eating greens for 3 weeks --> back to gym, will be having greens daily now --> greens off and on currently, working ot be consistent   []   [x]       Change in alcohol use- Pt holds 1/2 dose of warfarin on nights he drinks. Aware of the impact of alcohol. States he has not had alcohol in about a month. ---> nothing recently---> was drinking a lot last night---> last week had some but not much recently   --> some alcohol  9/22 ---> Drinks 1/2 of a fifth of alcohol every other weekend.---> some recently but nothing over weekend --> states that he had large amount of alcohol over weekend, 3-4 shots --> alcohol 4-5 days ago --> inc EtOH d/t camping. Drink 2 days ago. --> none --> had partied over the weekend but was driving so didn't drink as much --> drink on Friday --> no changes   []   [x]       Change in activity  --> back to gym now   []   [x]       Hospital admission  []   [x]       Emergency department visit  [x]   []       Other complaints discussed DOACs in comparison to warfarin --> discussed there is no non-pharmacologic way to thin blood      Clinical Outcomes:  Yes     No   []   [x]       Major bleeding event  []   [x]       Thromboembolic event  Takes warfarin in AM  Duration of warfarin Therapy: indefinitely    INR Range:  2.0-3.0  Cautious when holding warfarin--INR drops quickly     Patient is generally rarely in range. He fluctuates on his dose due to diet inconsistencies and alcohol. He self- doses and adjusts based on what he has been drinking. INR is 2.3 today   Continue dose of 3.75 mg on Sat and 7.5 mg all other days of the week   Encouraged to maintain a consistency of vegetables/salads.   Recheck INR in 3 weeks, 1/6/22    Does not need pw printed     * Consent form signed on 11/17*    Referring is Dr. Pascual Madrid - REF updated 4/15/21  INR (no units)   Date Value   11/17/2021 4.0   10/20/2021 2.3   09/15/2021 2.3   08/18/2021 2.2   04/15/2021 4.43 (H)   09/20/2018 2.1   08/17/2018 1.9   07/06/2018 2     For Pharmacy Admin Tracking Only     Total # of Interventions Recommended: 0   Total # of Interventions Accepted: 0   Time Spent (min): 15

## 2022-01-06 ENCOUNTER — TELEPHONE (OUTPATIENT)
Dept: PHARMACY | Age: 52
End: 2022-01-06

## 2022-01-06 ENCOUNTER — APPOINTMENT (OUTPATIENT)
Dept: PHARMACY | Age: 52
End: 2022-01-06
Payer: COMMERCIAL

## 2022-01-11 NOTE — TELEPHONE ENCOUNTER
L/m for patient letting him know his appt was scheduled for 9:30a. 1/13. Time changed due to staffing issue.

## 2022-01-17 ENCOUNTER — ANTI-COAG VISIT (OUTPATIENT)
Dept: PHARMACY | Age: 52
End: 2022-01-17
Payer: COMMERCIAL

## 2022-01-17 DIAGNOSIS — I82.91 CHRONIC EMBOLISM AND THROMBOSIS OF UNSPECIFIED VEIN: ICD-10-CM

## 2022-01-17 DIAGNOSIS — Z79.01 LONG TERM (CURRENT) USE OF ANTICOAGULANTS: Primary | ICD-10-CM

## 2022-01-17 LAB — INTERNATIONAL NORMALIZATION RATIO, POC: 4.1

## 2022-01-17 PROCEDURE — 85610 PROTHROMBIN TIME: CPT

## 2022-01-17 PROCEDURE — 99211 OFF/OP EST MAY X REQ PHY/QHP: CPT

## 2022-01-17 NOTE — PROGRESS NOTES
inc EtOH d/t camping. Drink 2 days ago. --> none --> had partied over the weekend but was driving so didn't drink as much --> drink on Friday --> no changes --> none recently  []   [x]       Change in activity  --> back to gym now   []   [x]       Hospital admission  []   [x]       Emergency department visit  [x]   []       Other complaints discussed DOACs in comparison to warfarin --> discussed there is no non-pharmacologic way to thin blood      Clinical Outcomes:  Yes     No   []   [x]       Major bleeding event  []   [x]       Thromboembolic event  Takes warfarin in AM  Duration of warfarin Therapy: indefinitely    INR Range:  2.0-3.0  Cautious when holding warfarin--INR drops quickly     Patient is generally rarely in range. He fluctuates on his dose due to diet inconsistencies and alcohol. He self- doses and adjusts based on what he has been drinking. INR is 4.1 today   Hold dose tomorrow then continue dose of 3.75 mg on Sat and 7.5 mg all other days of the week   Encouraged to maintain a consistency of vegetables/salads.   Recheck INR in 2 weeks, 1/31    Does not need pw printed     * Consent form signed on 11/17*    Referring is Dr. Tere Fine - REF updated 4/15/21  INR (no units)   Date Value   12/16/2021 2.3   11/17/2021 4.0   10/20/2021 2.3   09/15/2021 2.3   04/15/2021 4.43 (H)   09/20/2018 2.1   08/17/2018 1.9   07/06/2018 2       For Pharmacy Admin Tracking Only     Intervention Detail: Dose Adjustment: 1, reason: Therapy Optimization   Total # of Interventions Recommended: 1   Total # of Interventions Accepted: 1   Time Spent (min): 15

## 2022-01-31 ENCOUNTER — TELEPHONE (OUTPATIENT)
Dept: PHARMACY | Age: 52
End: 2022-01-31

## 2022-02-02 NOTE — TELEPHONE ENCOUNTER
LVM stating if weather condition is at a level 3 emergency tomorrow the clinic will be closed. Asked that he call back to r/s his appt.

## 2022-02-16 ENCOUNTER — ANTI-COAG VISIT (OUTPATIENT)
Dept: PHARMACY | Age: 52
End: 2022-02-16
Payer: COMMERCIAL

## 2022-02-16 DIAGNOSIS — I82.91 CHRONIC EMBOLISM AND THROMBOSIS OF UNSPECIFIED VEIN: ICD-10-CM

## 2022-02-16 DIAGNOSIS — Z79.01 LONG TERM (CURRENT) USE OF ANTICOAGULANTS: Primary | ICD-10-CM

## 2022-02-16 LAB — INTERNATIONAL NORMALIZATION RATIO, POC: 2.8

## 2022-02-16 PROCEDURE — 99211 OFF/OP EST MAY X REQ PHY/QHP: CPT

## 2022-02-16 PROCEDURE — 85610 PROTHROMBIN TIME: CPT

## 2022-02-16 NOTE — PROGRESS NOTES
Mr. Courtney Leroy is a 47 y.o. y/o male with history of pulmonary embolism with infarction. He has been taking warfarin since 2001   He presents today for anticoagulation monitoring and adjustment. Pertinent PMH: had a DVT and two pulmonary e/mbolism. (+) hepatitis B (2/2016)  Patient Reported Findings:  Yes     No  [x]   []       Patient verifies current dosing regimen as listed  []   [x]       S/S bleeding/bruising/swelling/SOB  []   [x]       Blood in urine or stool- denies   []   [x]       Procedures scheduled in the future at this time- possible colonoscopy, not yet scheduled --> no schedule procedure at this time --> denies   []   [x]       Missed Dose  -denies   []   [x]       Extra Dose -denies   []   [x]       Change in medications -denies, started centrum for men 9/19 (does contain vit K) --> took Theraflu on Tues, Wed, Thur of last week --> had tylenol the last few days --> took tylenol/tehraflu last tues and wed --> no tylenol/tehraflu, back to norm  [x]   []       Change in health/diet/appetite-- reports he is back in the gym and  has been having green smoothies daily and sal/ad ~4x/week. (This has been his daily vit k intake for ~ 3 days now) plans to continue as such. --> has increased green intake to green smoothie twice a day---> Eating all plant based diet. --> eating greens every day in juice --> having juice every morning and then serving of vit k every day as well --> no changes -->had not been eating greens for 3 weeks --> back to gym, will be having greens daily now --> greens off and on currently, working ot be consistent --> has been back to normal with vit k for a few days then none for pat few days d/t feeling ill --> V8 instead of greens in drink, back to feeling better   []   [x]       Change in alcohol use- Pt holds 1/2 dose of warfarin on nights he drinks. Aware of the impact of alcohol.  States he has not had alcohol in about a month. ---> some recently but nothing over weekend --> states that he had large amount of alcohol over weekend, 3-4 shots --> alcohol 4-5 days ago --> inc EtOH d/t camping. Drink 2 days ago. --> none --> had partied over the weekend but was driving so didn't drink as much --> drink on Friday --> no changes --> none recently  []   [x]       Change in activity  --> back to gym now   []   [x]       Hospital admission  []   [x]       Emergency department visit  [x]   []       Other complaints discussed DOACs in comparison to warfarin --> discussed there is no non-pharmacologic way to thin blood      Clinical Outcomes:  Yes     No   []   [x]       Major bleeding event  []   [x]       Thromboembolic event  Takes warfarin in AM  Duration of warfarin Therapy: indefinitely    INR Range:  2.0-3.0  Cautious when holding warfarin--INR drops quickly     Patient is generally rarely in range. He fluctuates on his dose due to diet inconsistencies and alcohol. He self- doses and adjusts based on what he has been drinking. INR is 2.8 today   Continue dose of 3.75 mg on Sat and 7.5 mg all other days of the week   Encouraged to maintain a consistency of vegetables/salads.   Recheck INR in 2 weeks, 3/2    Does not need pw printed     * Consent form signed on 11/17*    Referring is Dr. Naif Trevino - REF updated 4/15/21  INR (no units)   Date Value   02/16/2022 2.8   01/17/2022 4.1   12/16/2021 2.3   11/17/2021 4.0   04/15/2021 4.43 (H)   09/20/2018 2.1   08/17/2018 1.9   07/06/2018 2       For Pharmacy Admin Tracking Only     Total # of Interventions Recommended: 0   Total # of Interventions Accepted: 0   Time Spent (min): 15

## 2022-03-02 ENCOUNTER — APPOINTMENT (OUTPATIENT)
Dept: PHARMACY | Age: 52
End: 2022-03-02
Payer: COMMERCIAL

## 2022-03-02 ENCOUNTER — TELEPHONE (OUTPATIENT)
Dept: PHARMACY | Age: 52
End: 2022-03-02

## 2022-03-02 NOTE — TELEPHONE ENCOUNTER
Pt LVM asking to cancel [facility] appt today , wanted to r/s same time next week either wed or thurs. R/s on 3/10 LVM asking pt to confirm appt.

## 2022-03-10 NOTE — TELEPHONE ENCOUNTER
Pt LVM asking to cancel [facility] appt today, wanted to r/s same time next week. R/s on 3/17 pt will need to confirm this appt.

## 2022-03-17 ENCOUNTER — ANTI-COAG VISIT (OUTPATIENT)
Dept: PHARMACY | Age: 52
End: 2022-03-17
Payer: COMMERCIAL

## 2022-03-17 DIAGNOSIS — I82.91 CHRONIC EMBOLISM AND THROMBOSIS OF UNSPECIFIED VEIN: ICD-10-CM

## 2022-03-17 DIAGNOSIS — Z79.01 LONG TERM (CURRENT) USE OF ANTICOAGULANTS: Primary | ICD-10-CM

## 2022-03-17 LAB — INTERNATIONAL NORMALIZATION RATIO, POC: 4.8

## 2022-03-17 PROCEDURE — 85610 PROTHROMBIN TIME: CPT

## 2022-03-17 PROCEDURE — 99212 OFFICE O/P EST SF 10 MIN: CPT

## 2022-03-17 NOTE — PROGRESS NOTES
Mr. Maulik Arenas is a 47 y.o. y/o male with history of pulmonary embolism with infarction. He has been taking warfarin since 2001   He presents today for anticoagulation monitoring and adjustment. Pertinent PMH: had a DVT and two pulmonary e/mbolism. (+) hepatitis B (2/2016)  Patient Reported Findings:  Yes     No  [x]   []       Patient verifies current dosing regimen as listed  []   [x]       S/S bleeding/bruising/swelling/SOB  []   [x]       Blood in urine or stool- denies   []   [x]       Procedures scheduled in the future at this time- possible colonoscopy, not yet scheduled --> no schedule procedure at this time --> denies   [x]   []       Missed Dose  Might have missed dose on Monday so took extra 1/2 tablet    []   [x]       Extra Dose -denies   []   [x]       Change in medications -denies, started centrum for men 9/19 (does contain vit K) --> took Theraflu on Tues, Wed, Thur of last week --> had tylenol the last few days --> took tylenol/tehraflu last tues and wed --> no tylenol/tehraflu, back to norm --> no changes   [x]   []       Change in health/diet/appetite-- reports he is back in the gym and  has been having green smoothies daily and sal/ad ~4x/week. (This has been his daily vit k intake for ~ 3 days now) plans to continue as such. --> has increased green intake to green smoothie twice a day---> Eating all plant based diet. --> eating greens every day in juice --> having juice every morning and then serving of vit k every day as well --> had not been eating greens for 3 weeks --> back to gym, will be having greens daily now --> greens off and on currently, working ot be consistent --> has been back to normal with vit k for a few days then none for pat few days d/t feeling ill --> V8 instead of greens in drink, back to feeling better  --> no vit k since end of last month   []   [x]       Change in alcohol use- Pt holds 1/2 dose of warfarin on nights he drinks. Aware of the impact of alcohol.  States he has not had alcohol in about a month. ---> some recently but nothing over weekend --> states that he had large amount of alcohol over weekend, 3-4 shots --> alcohol 4-5 days ago --> inc EtOH d/t camping. Drink 2 days ago. --> none --> had partied over the weekend but was driving so didn't drink as much --> drink on Friday --> no changes --> none recently  []   [x]       Change in activity  --> back to gym now   []   [x]       Hospital admission  []   [x]       Emergency department visit  [x]   []       Other complaints discussed DOACs in comparison to warfarin --> discussed there is no non-pharmacologic way to thin blood      Clinical Outcomes:  Yes     No   []   [x]       Major bleeding event  []   [x]       Thromboembolic event  Takes warfarin in AM  Duration of warfarin Therapy: indefinitely    INR Range:  2.0-3.0  Cautious when holding warfarin--INR drops quickly     Patient is generally rarely in range. He fluctuates on his dose due to diet inconsistencies and alcohol. He self- doses and adjusts based on what he has been drinking. INR is 4.8 today d/t no vit k and possibly having extra 3.75 mg warfarin on Mon   Hold dose tonight, take 3.75 mg on Fri then continue dose of 3.75 mg on Sat and 7.5 mg all other days of the week   Encouraged to maintain a consistency of vegetables/salads.   Recheck INR in 2 weeks, 3/31    Does not need pw printed     * Consent form signed on 11/17*    Referring is Dr. Irina Vigil - REF updated 4/15/21  INR (no units)   Date Value   02/16/2022 2.8   01/17/2022 4.1   12/16/2021 2.3   11/17/2021 4.0   04/15/2021 4.43 (H)   09/20/2018 2.1   08/17/2018 1.9   07/06/2018 2       For Pharmacy Admin Tracking Only     Intervention Detail: Dose Adjustment: 1, reason: Therapy Optimization   Total # of Interventions Recommended: 1   Total # of Interventions Accepted: 1   Time Spent (min): 15

## 2022-03-22 ENCOUNTER — PATIENT MESSAGE (OUTPATIENT)
Dept: INTERNAL MEDICINE CLINIC | Age: 52
End: 2022-03-22

## 2022-04-07 ENCOUNTER — ANTI-COAG VISIT (OUTPATIENT)
Dept: PHARMACY | Age: 52
End: 2022-04-07
Payer: COMMERCIAL

## 2022-04-07 DIAGNOSIS — Z79.01 LONG TERM (CURRENT) USE OF ANTICOAGULANTS: Primary | ICD-10-CM

## 2022-04-07 DIAGNOSIS — I82.91 CHRONIC EMBOLISM AND THROMBOSIS OF UNSPECIFIED VEIN: ICD-10-CM

## 2022-04-07 LAB — INTERNATIONAL NORMALIZATION RATIO, POC: 2.3

## 2022-04-07 PROCEDURE — 85610 PROTHROMBIN TIME: CPT

## 2022-04-07 PROCEDURE — 99211 OFF/OP EST MAY X REQ PHY/QHP: CPT

## 2022-04-07 NOTE — PROGRESS NOTES
Mr. Lyn Garvey is a 47 y.o. y/o male with history of pulmonary embolism with infarction. He has been taking warfarin since 2001   He presents today for anticoagulation monitoring and adjustment. Pertinent PMH: had a DVT and two pulmonary e/mbolism. (+) hepatitis B (2/2016)  Patient Reported Findings:  Yes     No  [x]   []       Patient verifies current dosing regimen as listed  []   [x]       S/S bleeding/bruising/swelling/SOB  []   [x]       Blood in urine or stool- denies ---> bloody nose   []   [x]       Procedures scheduled in the future at this time- possible colonoscopy, not yet scheduled --> no schedule procedure at this time --> denies   [x]   []       Missed Dose  Denies   []   [x]       Extra Dose -denies   []   [x]       Change in medications -denies, started centrum for men 9/19 (does contain vit K) --> took Theraflu on Tues, Wed, Thur of last week --> had tylenol the last few days --> took tylenol/tehraflu last tues and wed --> no tylenol/tehraflu, back to norm --> no changes---> tehraflu on Tues   [x]   []       Change in health/diet/appetite-- reports he is back in the gym and  has been having green smoothies daily and sal/ad ~4x/week. (This has been his daily vit k intake for ~ 3 days now) plans to continue as such. --> has increased green intake to green smoothie twice a day---> Eating all plant based diet. --> eating greens every day in juice --> having juice every morning and then serving of vit k every day as well --> had not been eating greens for 3 weeks --> back to gym, will be having greens daily now --> greens off and on currently, working ot be consistent --> has been back to normal with vit k for a few days then none for pat few days d/t feeling ill --> V8 instead of greens in drink, back to feeling better  --> no vit k since end of last month--->V8 in protein drink    []   [x]       Change in alcohol use- Pt holds 1/2 dose of warfarin on nights he drinks.  Aware of the impact of alcohol. States he has not had alcohol in about a month. ---> some recently but nothing over weekend --> states that he had large amount of alcohol over weekend, 3-4 shots --> alcohol 4-5 days ago --> inc EtOH d/t camping. Drink 2 days ago. --> none --> had partied over the weekend but was driving so didn't drink as much --> drink on Friday --> no changes --> none recently  []   [x]       Change in activity  --> back to gym now   []   [x]       Hospital admission  []   [x]       Emergency department visit  [x]   []       Other complaints discussed DOACs in comparison to warfarin --> discussed there is no non-pharmacologic way to thin blood      Clinical Outcomes:  Yes     No   []   [x]       Major bleeding event  []   [x]       Thromboembolic event  Takes warfarin in AM  Duration of warfarin Therapy: indefinitely    INR Range:  2.0-3.0  Cautious when holding warfarin--INR drops quickly     Patient is generally rarely in range. He fluctuates on his dose due to diet inconsistencies and alcohol. He self- doses and adjusts based on what he has been drinking. Non-compliant with apts (was due back 3/31). INR is 2.3 today   Continue dose of 3.75 mg on Sat and 7.5 mg all other days of the week. Encouraged to maintain a consistency of vegetables/salads.   Recheck INR in 3 weeks, 4/28    Does not need pw printed     * Consent form signed on 11/17*    Referring is Dr. Susan Mcfarlane - REF updated 4/15/21  INR (no units)   Date Value   04/07/2022 2.3   03/17/2022 4.8   02/16/2022 2.8   01/17/2022 4.1   04/15/2021 4.43 (H)   09/20/2018 2.1   08/17/2018 1.9   07/06/2018 2       For Pharmacy Admin Tracking Only     Total # of Interventions Recommended: 0   Total # of Interventions Accepted: 0   Time Spent (min): 15

## 2022-04-28 ENCOUNTER — ANTI-COAG VISIT (OUTPATIENT)
Dept: PHARMACY | Age: 52
End: 2022-04-28
Payer: COMMERCIAL

## 2022-04-28 DIAGNOSIS — I82.91 CHRONIC EMBOLISM AND THROMBOSIS OF UNSPECIFIED VEIN: ICD-10-CM

## 2022-04-28 DIAGNOSIS — Z79.01 LONG TERM (CURRENT) USE OF ANTICOAGULANTS: Primary | ICD-10-CM

## 2022-04-28 LAB — INTERNATIONAL NORMALIZATION RATIO, POC: 4.3

## 2022-04-28 PROCEDURE — 85610 PROTHROMBIN TIME: CPT

## 2022-04-28 PROCEDURE — 99212 OFFICE O/P EST SF 10 MIN: CPT

## 2022-04-28 NOTE — PROGRESS NOTES
Mr. Alfonso Rogers is a 47 y.o. y/o male with history of pulmonary embolism with infarction. He has been taking warfarin since 2001   He presents today for anticoagulation monitoring and adjustment. Pertinent PMH: had a DVT and two pulmonary e/mbolism. (+) hepatitis B (2/2016)  Patient Reported Findings:  Yes     No  [x]   []       Patient verifies current dosing regimen as listed  []   [x]       S/S bleeding/bruising/swelling/SOB  []   [x]       Blood in urine or stool- denies ---> bloody nose   []   [x]       Procedures scheduled in the future at this time- possible colonoscopy, not yet scheduled --> no schedule procedure at this time --> denies   []   [x]       Missed Dose  Denies   []   [x]       Extra Dose -denies   []   [x]       Change in medications -denies, started centrum for men 9/19 (does contain vit K) --> took Theraflu on Tues, Wed, Thur of last week --> had tylenol the last few days --> no changes   [x]   []       Change in health/diet/appetite-- reports he is back in the gym and  has been having green smoothies daily and sal/ad ~4x/week. (This has been his daily vit k intake for ~ 3 days now) plans to continue as such. --> has increased green intake to green smoothie twice a day---> Eating all plant based diet. --> eating greens every day in juice --> having juice every morning and then serving of vit k every day as well --> had not been eating greens for 3 weeks --> back to gym, will be having greens daily now --> greens off and on currently, working ot be consistent --> has been back to normal with vit k for a few days then none for pat few days d/t feeling ill --> V8 instead of greens in drink, back to feeling better  --> no vit k since end of last month--->V8 in protein drink --> having 4 cups of broccoli/day for week. Has not had any protein drinks since last week but is planning to resume.    []   [x]       Change in alcohol use- Pt holds 1/2 dose of warfarin on nights he drinks.  Aware of the

## 2022-05-04 NOTE — TELEPHONE ENCOUNTER
Warfarin prescription phoned into [facility] 24 to [facility] under Dr. Edmund Fernando CNP  Warfarin 7.5 mg tabs  Take 3.75 mg on Sat and 7.5 mg all other days of the week  90 days   2 refills

## 2022-05-12 ENCOUNTER — TELEPHONE (OUTPATIENT)
Dept: PHARMACY | Age: 52
End: 2022-05-12

## 2022-05-31 ENCOUNTER — TELEPHONE (OUTPATIENT)
Dept: PHARMACY | Age: 52
End: 2022-05-31

## 2022-06-01 NOTE — TELEPHONE ENCOUNTER
Pt LVM asking to r/s appt for next wed 6/8.      Returned call letting pt know scheduled for wed 6/8

## 2022-06-08 ENCOUNTER — ANTI-COAG VISIT (OUTPATIENT)
Dept: PHARMACY | Age: 52
End: 2022-06-08
Payer: COMMERCIAL

## 2022-06-08 DIAGNOSIS — Z79.01 LONG TERM (CURRENT) USE OF ANTICOAGULANTS: Primary | ICD-10-CM

## 2022-06-08 DIAGNOSIS — I82.91 CHRONIC EMBOLISM AND THROMBOSIS OF UNSPECIFIED VEIN: ICD-10-CM

## 2022-06-08 LAB — INTERNATIONAL NORMALIZATION RATIO, POC: 3.3

## 2022-06-08 PROCEDURE — 85610 PROTHROMBIN TIME: CPT

## 2022-06-08 PROCEDURE — 99211 OFF/OP EST MAY X REQ PHY/QHP: CPT
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
## 2022-06-15 ENCOUNTER — OFFICE VISIT (OUTPATIENT)
Dept: INTERNAL MEDICINE CLINIC | Age: 52
End: 2022-06-15
Payer: COMMERCIAL

## 2022-06-15 VITALS
DIASTOLIC BLOOD PRESSURE: 82 MMHG | SYSTOLIC BLOOD PRESSURE: 122 MMHG | HEART RATE: 67 BPM | BODY MASS INDEX: 30.99 KG/M2 | WEIGHT: 186 LBS | HEIGHT: 65 IN | OXYGEN SATURATION: 97 %

## 2022-06-15 DIAGNOSIS — Z71.85 VACCINE COUNSELING: ICD-10-CM

## 2022-06-15 DIAGNOSIS — Z86.711 HISTORY OF PULMONARY EMBOLISM: ICD-10-CM

## 2022-06-15 DIAGNOSIS — Z00.00 ANNUAL PHYSICAL EXAM: Primary | ICD-10-CM

## 2022-06-15 DIAGNOSIS — F12.10 MARIJUANA ABUSE: ICD-10-CM

## 2022-06-15 DIAGNOSIS — R97.20 ELEVATED PSA: ICD-10-CM

## 2022-06-15 DIAGNOSIS — Z12.11 SCREEN FOR COLON CANCER: ICD-10-CM

## 2022-06-15 DIAGNOSIS — N52.9 DIFFICULTY ATTAINING ERECTION: ICD-10-CM

## 2022-06-15 DIAGNOSIS — Z13.220 SCREENING, LIPID: ICD-10-CM

## 2022-06-15 PROCEDURE — G8417 CALC BMI ABV UP PARAM F/U: HCPCS | Performed by: NURSE PRACTITIONER

## 2022-06-15 PROCEDURE — 1036F TOBACCO NON-USER: CPT | Performed by: NURSE PRACTITIONER

## 2022-06-15 PROCEDURE — 99213 OFFICE O/P EST LOW 20 MIN: CPT | Performed by: NURSE PRACTITIONER

## 2022-06-15 PROCEDURE — G8427 DOCREV CUR MEDS BY ELIG CLIN: HCPCS | Performed by: NURSE PRACTITIONER

## 2022-06-15 PROCEDURE — 99396 PREV VISIT EST AGE 40-64: CPT | Performed by: NURSE PRACTITIONER

## 2022-06-15 PROCEDURE — 3017F COLORECTAL CA SCREEN DOC REV: CPT | Performed by: NURSE PRACTITIONER

## 2022-06-15 RX ORDER — TADALAFIL 5 MG/1
TABLET ORAL
Qty: 30 TABLET | Refills: 0 | Status: SHIPPED | OUTPATIENT
Start: 2022-06-15 | End: 2022-07-13 | Stop reason: SDUPTHER

## 2022-06-15 SDOH — ECONOMIC STABILITY: FOOD INSECURITY: WITHIN THE PAST 12 MONTHS, THE FOOD YOU BOUGHT JUST DIDN'T LAST AND YOU DIDN'T HAVE MONEY TO GET MORE.: NEVER TRUE

## 2022-06-15 SDOH — ECONOMIC STABILITY: FOOD INSECURITY: WITHIN THE PAST 12 MONTHS, YOU WORRIED THAT YOUR FOOD WOULD RUN OUT BEFORE YOU GOT MONEY TO BUY MORE.: NEVER TRUE

## 2022-06-15 ASSESSMENT — SOCIAL DETERMINANTS OF HEALTH (SDOH): HOW HARD IS IT FOR YOU TO PAY FOR THE VERY BASICS LIKE FOOD, HOUSING, MEDICAL CARE, AND HEATING?: NOT HARD AT ALL

## 2022-06-15 ASSESSMENT — ENCOUNTER SYMPTOMS
CONSTIPATION: 0
COUGH: 0
SINUS PAIN: 0
SHORTNESS OF BREATH: 0
WHEEZING: 0
ABDOMINAL PAIN: 0
DIARRHEA: 0
VOMITING: 0
SORE THROAT: 0
NAUSEA: 0

## 2022-06-15 ASSESSMENT — PATIENT HEALTH QUESTIONNAIRE - PHQ9
1. LITTLE INTEREST OR PLEASURE IN DOING THINGS: 0
SUM OF ALL RESPONSES TO PHQ QUESTIONS 1-9: 0
SUM OF ALL RESPONSES TO PHQ9 QUESTIONS 1 & 2: 0
2. FEELING DOWN, DEPRESSED OR HOPELESS: 0
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
## 2022-06-15 NOTE — PATIENT INSTRUCTIONS
Please get your fasting lab work (no food or drink for 10-12 hours prior besides water) completed M-F [id number] at our office. [facility] lab has walk-in hours available as well - no appointment is needed. We will call or [facility] message you with your results. Call for colonoscopy:  [id number] Karson Murguia MD  [address] [facility], [address] [place] [address] Dr, [address]  Phone: [phone]    Patient Education   tadalafil  Pronunciation: sarah Parada  Brand: Risa Ates, Cialis  What is the most important information I should know about tadalafil? Taking tadalafil with certain other medicines can cause a sudden and serious decrease in blood pressure. Do not take tadalafil if you also use riociguat, or a nitrate drug such as nitroglycerin, isosorbide dinitrate, isosorbide mononitrate, or recreationaldrugs such as \"poppers. \"  Get medical help at once if you have nausea, chest pain, or dizziness duringsex. What is tadalafil? The Cialis brand of tadalafil is used in men to treat erectile dysfunction (impotence) and symptoms of benign prostatichypertrophy (enlarged prostate). Adcirca and Alyq are used in men and women to treat pulmonary arterial hypertension (PAH) and to improve exercisecapacity. Do not take Cialis for erectile dysfunction if you are taking Adcirca or Alyq for pulmonary arterial hypertension. Tadalafil may also be used for purposes not listed in this medication guide. What should I discuss with my healthcare provider before taking tadalafil? You should not take tadalafil if you are allergic to it, or:   if you take other medicines to treat pulmonary arterial hypertension, such as riociguat (Adempas). Do not take tadalafil if you are also using a nitrate drug for chest pain or heart problems. This includes nitroglycerin, isosorbide dinitrate, and isosorbide mononitrate.  Nitrates are also found in some recreational drugs such as amyl nitrate, butyl nitrate or nitrite (\"poppers\"). Taking tadalafil with a nitrate medicine can cause a sudden and serious decrease in blood pressure. Some tadalafil can remain in your bloodstream for 2 or more days after each dose you take (longer if you have liver or kidney disease). Avoid nitrate use during this time. Tell your doctor if you have ever had:   heart problems (chest pain, a heart rhythm disorder, heart failure);   a heart attack or stroke;   high or low blood pressure;   liver disease;   kidney disease (or if you are on dialysis);   retinitis pigmentosa (an inherited condition of the eye);   blindness in one or both eyes;   hearing problems;   blood circulation problems;   a blood cell disorder such as sickle cell anemia, multiple myeloma, or leukemia;   pulmonary veno-occlusive disease (PVOD);   a physical deformity of the penis (such as Peyronie's disease), or an erection lasting longer than 4 hours;   a stomach ulcer; or   health problems that make sexual activity unsafe. A small number of people taking tadalafil have had sudden vision loss. Most of these people already had eye problems, or had diabetes or other health conditions that can affect blood vessels in the eyes. It is not clear whether tadalafil causes vision loss. Do not start or stop taking tadalafil during pregnancy without your doctor's advice. Having pulmonary arterial hypertension (PAH) during pregnancy may cause heart failure, stroke, or other medical problems in both mother and baby. Tell your doctor right away if you become pregnant. It may not be safe to breastfeed while using this medicine. Ask your doctorabout any risk. Cialis is not for use in women. Tadalafil is not approved for use by anyone younger than 25years old. How should I take tadalafil? Follow all directions on your prescription label and read all medication guidesor instruction sheets. Use the medicine exactly as directed.   When taking Adcirca or Alyq, you may need 2 tablets for a full dose. Take both tablets one after the other. Do not split the dose. Take Cialis  just before sexual activity, but not more than once per day. Cialis  can help achieve an erection when sexual stimulation occurs. An erection willnot occur just by taking a pill. Do not break or split a Cialis tablet. Swallow it whole. Take the medicine at the same time each day, with or without food. Do not change your tadalafil dose or stop taking this medicine without your doctor's advice. Store at room temperature away from moisture and heat. What happens if I miss a dose? Since Cialis is used as needed, you are not likely to miss a dose. If you miss a dose of Adcirca or Alyq, take the medicine as soon as you can, but skip the missed dose if it is almost time for your next dose. Do not take two doses at one time. What happens if I overdose? Seek emergency medical attention or call the Poison Help line at 1-217.703.4762. What should I avoid while taking tadalafil? Avoid drinking alcohol. It may increase your risk of dizziness or fainting. Grapefruit may interact with tadalafil and lead to unwanted side effects. Avoid the use of grapefruit products. What are the possible side effects of tadalafil? Get emergency medical help if you have signs of an allergic reaction: hives; difficulty breathing; swelling of your face, lips, tongue, or throat. Stop using tadalafil and call your doctor at once if you have:   heart attack symptoms --chest pain or pressure, pain spreading to your jaw or shoulder, nausea, sweating;   vision changes or sudden vision loss;   ringing in your ears or sudden hearing loss; or   an erection is painful or lasts longer than 4 hours (prolonged erection can damage the penis). Stop and get medical help at once if you have nausea, chest pain, or dizziness during sex. You could be having a life-threatening side effect.   Common side effects may include:   headache;   flushing (warmth, specifically indicated otherwise. [facility] drug information does not endorse drugs, diagnose patients or recommend therapy. [facility] drug information is an informational resource designed to assist licensed healthcare practitioners in caring for their patients and/or to serve consumers viewing this service as a supplement to, and not a substitute for, the expertise, skill, knowledge and judgment of healthcare practitioners. The absence of a warning for a given drug or drug combination in no way should be construed to indicate that the drug or drug combination is safe, effective or appropriate for any given patient. [facility] does not assume any responsibility for any aspect of healthcare administered with the aid of information [facility] provides. The information contained herein is not intended to cover all possible uses, directions, precautions, warnings, drug interactions, allergic reactions, or adverse effects. If you have questions about the drugs you are taking, check with yourdoctor, nurse or pharmacist.  Copyright 2539-2143 [address]: .01. Revision date: 3/5/2021. Care instructions adapted under license by [facility] ([facility]). If you have questions about a medical condition or this instruction, always ask your healthcare professional. [place] [address] any warranty or liability for your use of this information.

## 2022-06-22 ENCOUNTER — ANTI-COAG VISIT (OUTPATIENT)
Dept: PHARMACY | Age: 52
End: 2022-06-22
Payer: COMMERCIAL

## 2022-06-22 DIAGNOSIS — Z79.01 LONG TERM (CURRENT) USE OF ANTICOAGULANTS: Primary | ICD-10-CM

## 2022-06-22 DIAGNOSIS — I82.91 CHRONIC EMBOLISM AND THROMBOSIS OF UNSPECIFIED VEIN: ICD-10-CM

## 2022-06-22 LAB — INTERNATIONAL NORMALIZATION RATIO, POC: 2.3

## 2022-06-22 PROCEDURE — 85610 PROTHROMBIN TIME: CPT

## 2022-06-22 PROCEDURE — 99211 OFF/OP EST MAY X REQ PHY/QHP: CPT

## 2022-06-22 NOTE — PROGRESS NOTES
Mr. Nohemy Rebolledo is a 47 y.o. y/o male with history of pulmonary embolism with infarction. He has been taking warfarin since 2001   He presents today for anticoagulation monitoring and adjustment. Pertinent PMH: had a DVT and two pulmonary e/mbolism. (+) hepatitis B (2/2016)  Patient Reported Findings:  Yes     No  [x]   []       Patient verifies current dosing regimen as listed- confirms   []   [x]       S/S bleeding/bruising/swelling/SOB- denies   []   [x]       Blood in urine or stool- denies ---> bloody nose   []   [x]       Procedures scheduled in the future at this time- possible colonoscopy, not yet scheduled --> no schedule procedure at this time --> denies   []   [x]       Missed Dose  Denies   []   [x]       Extra Dose -denies   []   [x]       Change in medications -denies, started centrum for men 9/19 (does contain vit K) --> took Theraflu on Tues, Wed, Thur of last week --> had tylenol the last few days --> no changes---> tadalafil    []   [x]       Change in health/diet/appetite-- reports he is back in the gym and  has been having green smoothies daily and sal/ad ~4x/week. (This has been his daily vit k intake for ~ 3 days now) plans to continue as such. --> has increased green intake to green smoothie twice a day---> Eating all plant based diet. --> eating greens every day in juice --> having juice every morning and then serving of vit k every day as well --> had not been eating greens for 3 weeks --> back to gym, will be having greens daily now --> greens off and on currently, working ot be consistent --> has been back to normal with vit k for a few days then none for pat few days d/t feeling ill --> V8 instead of greens in drink, back to feeling better  --> no vit k since end of last month--->V8 in protein drink --> having 4 cups of broccoli/day for week. Has not had any protein drinks since last week but is planning to resume.  --> no changes---> V8 daily but no greens    [x]   []       Change in alcohol use- Pt holds 1/2 dose of warfarin on nights he drinks. Aware of the impact of alcohol. States he has not had alcohol in about a month. ---> some recently but nothing over weekend --> states that he had large amount of alcohol over weekend, 3-4 shots --> alcohol 4-5 days ago --> inc EtOH d/t camping. Drink 2 days ago. --> none --> had partied over the weekend but was driving so didn't drink as much --> drink on Friday --> no changes --> drank on Sat  []   [x]       Change in activity  --> back to gym now   []   [x]       Hospital admission  []   [x]       Emergency department visit  [x]   []       Other complaints discussed DOACs in comparison to warfarin --> discussed there is no non-pharmacologic way to thin blood      Clinical Outcomes:  Yes     No   []   [x]       Major bleeding event  []   [x]       Thromboembolic event  Takes warfarin in AM  Duration of warfarin Therapy: indefinitely    INR Range:  2.0-3.0  Cautious when holding warfarin--INR drops quickly     Patient is generally rarely in range. He fluctuates on his dose due to diet inconsistencies and alcohol. He self- doses and adjusts based on what he has been drinking. Non-compliant with apts     INR is 2.3 today   Continue dose of 3.75 mg on Sat and 7.5 mg all other days of the week. Encouraged to maintain a consistency of vegetables/salads.   Recheck INR in 3 weeks, 7/14    Does not need pw printed     * Consent form signed on 11/17*    Referring is Dr. Bob Palacios - REF updated 4/15/21  INR (no units)   Date Value   04/15/2021 4.43 (H)   09/20/2018 2.1   08/17/2018 1.9   07/06/2018 2     INR,(POC) (no units)   Date Value   06/22/2022 2.3   06/08/2022 3.3   04/28/2022 4.3   04/07/2022 2.3       For Pharmacy Admin Tracking Only     Total # of Interventions Recommended: 0   Total # of Interventions Accepted: 0   Time Spent (min): 15

## 2022-07-11 DIAGNOSIS — Z00.00 ANNUAL PHYSICAL EXAM: ICD-10-CM

## 2022-07-11 DIAGNOSIS — R97.20 ELEVATED PSA: ICD-10-CM

## 2022-07-11 DIAGNOSIS — Z13.220 SCREENING, LIPID: ICD-10-CM

## 2022-07-11 LAB
A/G RATIO: 2.1 (ref 1.1–2.2)
ALBUMIN SERPL-MCNC: 4.4 G/DL (ref 3.4–5)
ALP BLD-CCNC: 97 U/L (ref 40–129)
ALT SERPL-CCNC: 20 U/L (ref 10–40)
ANION GAP SERPL CALCULATED.3IONS-SCNC: 12 MMOL/L (ref 3–16)
AST SERPL-CCNC: 17 U/L (ref 15–37)
BASOPHILS ABSOLUTE: 0 K/UL (ref 0–0.2)
BASOPHILS RELATIVE PERCENT: 0.8 %
BILIRUB SERPL-MCNC: 0.6 MG/DL (ref 0–1)
BUN BLDV-MCNC: 14 MG/DL (ref 7–20)
CALCIUM SERPL-MCNC: 9.4 MG/DL (ref 8.3–10.6)
CHLORIDE BLD-SCNC: 104 MMOL/L (ref 99–110)
CHOLESTEROL, FASTING: 263 MG/DL (ref 0–199)
CO2: 22 MMOL/L (ref 21–32)
CREAT SERPL-MCNC: 1.1 MG/DL (ref 0.9–1.3)
EOSINOPHILS ABSOLUTE: 0.1 K/UL (ref 0–0.6)
EOSINOPHILS RELATIVE PERCENT: 2 %
GFR AFRICAN AMERICAN: >60
GFR NON-AFRICAN AMERICAN: >60
GLUCOSE BLD-MCNC: 97 MG/DL (ref 70–99)
HCT VFR BLD CALC: 45.7 % (ref 40.5–52.5)
HDLC SERPL-MCNC: 51 MG/DL (ref 40–60)
HEMOGLOBIN: 15.8 G/DL (ref 13.5–17.5)
LDL CHOLESTEROL CALCULATED: 182 MG/DL
LYMPHOCYTES ABSOLUTE: 1.7 K/UL (ref 1–5.1)
LYMPHOCYTES RELATIVE PERCENT: 35.7 %
MCH RBC QN AUTO: 33.9 PG (ref 26–34)
MCHC RBC AUTO-ENTMCNC: 34.6 G/DL (ref 31–36)
MCV RBC AUTO: 98 FL (ref 80–100)
MONOCYTES ABSOLUTE: 0.4 K/UL (ref 0–1.3)
MONOCYTES RELATIVE PERCENT: 7.8 %
NEUTROPHILS ABSOLUTE: 2.5 K/UL (ref 1.7–7.7)
NEUTROPHILS RELATIVE PERCENT: 53.7 %
PDW BLD-RTO: 13.3 % (ref 12.4–15.4)
PLATELET # BLD: 174 K/UL (ref 135–450)
PMV BLD AUTO: 7.3 FL (ref 5–10.5)
POTASSIUM SERPL-SCNC: 4.1 MMOL/L (ref 3.5–5.1)
PROSTATE SPECIFIC ANTIGEN: 3.87 NG/ML (ref 0–4)
RBC # BLD: 4.66 M/UL (ref 4.2–5.9)
SODIUM BLD-SCNC: 138 MMOL/L (ref 136–145)
TOTAL PROTEIN: 6.5 G/DL (ref 6.4–8.2)
TRIGLYCERIDE, FASTING: 149 MG/DL (ref 0–150)
VLDLC SERPL CALC-MCNC: 30 MG/DL
WBC # BLD: 4.6 K/UL (ref 4–11)

## 2022-07-13 ENCOUNTER — OFFICE VISIT (OUTPATIENT)
Dept: INTERNAL MEDICINE CLINIC | Age: 52
End: 2022-07-13
Payer: COMMERCIAL

## 2022-07-13 VITALS
HEART RATE: 54 BPM | OXYGEN SATURATION: 96 % | WEIGHT: 188 LBS | DIASTOLIC BLOOD PRESSURE: 80 MMHG | BODY MASS INDEX: 31.28 KG/M2 | SYSTOLIC BLOOD PRESSURE: 128 MMHG

## 2022-07-13 DIAGNOSIS — N52.9 DIFFICULTY ATTAINING ERECTION: Primary | ICD-10-CM

## 2022-07-13 DIAGNOSIS — E78.2 MIXED HYPERLIPIDEMIA: ICD-10-CM

## 2022-07-13 PROCEDURE — G8427 DOCREV CUR MEDS BY ELIG CLIN: HCPCS | Performed by: NURSE PRACTITIONER

## 2022-07-13 PROCEDURE — 99213 OFFICE O/P EST LOW 20 MIN: CPT | Performed by: NURSE PRACTITIONER

## 2022-07-13 PROCEDURE — 1036F TOBACCO NON-USER: CPT | Performed by: NURSE PRACTITIONER

## 2022-07-13 PROCEDURE — G8417 CALC BMI ABV UP PARAM F/U: HCPCS | Performed by: NURSE PRACTITIONER

## 2022-07-13 PROCEDURE — 3017F COLORECTAL CA SCREEN DOC REV: CPT | Performed by: NURSE PRACTITIONER

## 2022-07-13 RX ORDER — TADALAFIL 5 MG/1
TABLET ORAL
Qty: 30 TABLET | Refills: 0 | Status: SHIPPED | OUTPATIENT
Start: 2022-07-13 | End: 2022-08-19

## 2022-07-13 ASSESSMENT — ENCOUNTER SYMPTOMS
CONSTIPATION: 0
DIARRHEA: 0
ABDOMINAL PAIN: 0
BLOOD IN STOOL: 0
VOMITING: 0
NAUSEA: 0
COUGH: 0
SHORTNESS OF BREATH: 0

## 2022-07-13 NOTE — PATIENT INSTRUCTIONS
Call to schedule colonoscopy. Patient Education        Learning About High Cholesterol  What is high cholesterol? High cholesterol means that you have too much cholesterol in your blood. Cholesterol is a type of fat. It's needed for many body functions, such as making new cells. Cholesterol is made by your body. It also comes from food youeat. Having high cholesterol can lead to the buildup of plaque in artery walls. Thiscan increase your risk of heart attack and stroke. When your doctor talks about high cholesterol levels, your doctor is talking about your total cholesterol and LDL cholesterol (the \"bad\" cholesterol) levels. Your doctor may also speak about HDL (the \"good\" cholesterol) levels. High HDL is linked with a lower risk for coronary artery disease, heart attack,and stroke. Your cholesterol levels help your doctor find out your risk for having a heartattack or stroke. How can you help prevent high cholesterol? A heart-healthy lifestyle can help you prevent high cholesterol and lower yourrisk for a heart attack and stroke.  Eat heart-healthy foods. ? Eat fruits, vegetables, whole grains, beans, and other high-fiber foods. ? Eat lean proteins, such as seafood, lean meats, beans, nuts, and soy products. ? Eat healthy fats, such as canola and olive oil. ? Choose foods that are low in saturated fat. ? Limit sodium and alcohol. ? Limit drinks and foods with added sugar.  Be active. Try to do moderate activity at least 2½ hours a week. Or try vigorous activity at least 1¼ hours a week. You may want to walk or try other activities, such as running, swimming, cycling, or playing tennis or team sports.  Stay at a healthy weight. Lose weight if you need to.  Don't smoke. If you need help quitting, talk to your doctor about stop-smoking programs and medicines. These can increase your chances of quitting for good. How is high cholesterol treated?   The goal of treatment is to reduce your chances of having a heart attack orstroke. The goal is not to lower your cholesterol numbers only.  Have a heart-healthy lifestyle. This includes eating healthy foods, not smoking, losing weight, and being more active.  You may choose to take medicine. Follow-up care is a key part of your treatment and safety. Be sure to make and go to all appointments, and call your doctor if you are having problems. It's also a good idea to know your test results and keep alist of the medicines you take. Where can you learn more? Go to https://[employer]TV. org and sign in to your [employer] account. Enter Y764 in the SingOn box to learn more about \"Learning About High Cholesterol. \"     If you do not have an account, please click on the \"Sign Up Now\" link. Current as of: January 10, 2022               Content Version: 13.3  © 0549-9292 Healthwise, Incorporated. Care instructions adapted under license by [facility] ([facility]). If you have questions about a medical condition or this instruction, always ask your healthcare professional. [address] any warranty or liability for your use of this information.

## 2022-07-13 NOTE — Clinical Note
Last visit, a colonoscopy was ordered. Today, the patient states he did not schedule it but states he plans to do so. I am not seeing a patient back for a full year. Can you please follow-up in a few weeks to a month to ensure he scheduled this please? Thanks!

## 2022-07-13 NOTE — PROGRESS NOTES
Office Visit  7/13/2022    Subjective:  Chief Complaint   Patient presents with    1 Month Follow-Up    Other     ED symptoms much better     HPI:   Keenan Marie is a 47 y.o. male who presents to the clinic today for follow up. Trouble obtaining an erection- taking Cialis as needed and patient reports this is working very well. Denies side effects. Working on losing weight as well- going to the gym frequently. Review of Systems   Constitutional: Negative for appetite change, chills, fatigue and fever. Respiratory: Negative for cough and shortness of breath. Cardiovascular: Negative for chest pain. Gastrointestinal: Negative for abdominal pain, blood in stool, constipation, diarrhea, nausea and vomiting. Genitourinary: Negative for decreased urine volume, difficulty urinating, dysuria, flank pain, frequency, genital sores, hematuria and urgency. Skin: Negative for pallor.      Allergies   Allergen Reactions    Morphine Nausea And Vomiting       Current Outpatient Rx   Medication Sig Dispense Refill    tadalafil (CIALIS) 5 MG tablet Take 5 mg at least 30 minutes prior to anticipated sexual activity as one single dose and not more than once daily 30 tablet 0    warfarin (COUMADIN) 7.5 MG tablet Take 7.5 mg by mouth See Admin Instructions 7.5 mg 6 days/week and 3.75 mg on Saturdays         Patient Active Problem List   Diagnosis    Other pulmonary embolism and infarction    GI bleed    Acute hepatitis B    Hematemesis    Chronic embolism and thrombosis of unspecified vein    Long term (current) use of anticoagulants        Wt Readings from Last 3 Encounters:   07/13/22 188 lb (85.3 kg)   06/15/22 186 lb (84.4 kg)   04/13/21 190 lb 6.4 oz (86.4 kg)     BP Readings from Last 3 Encounters:   07/13/22 128/80   06/15/22 122/82   04/13/21 138/80     The 10-year ASCVD risk score (Erika Booker, et al., 2013) is: 6%    Values used to calculate the score:      Age: 47 years      Sex: Male      Is Non- : Yes      Diabetic: No      Tobacco smoker: No      Systolic Blood Pressure: 083 mmHg      Is BP treated: No      HDL Cholesterol: 51 mg/dL      Total Cholesterol: 263 mg/dL    Objective/Physical Exam:  /80 (Site: Left Upper Arm)   Pulse 54   Wt 188 lb (85.3 kg)   SpO2 96%   BMI 31.28 kg/m²   Body mass index is 31.28 kg/m². Physical Exam  Vitals reviewed. Constitutional:       General: He is not in acute distress. Appearance: He is well-developed. He is not diaphoretic. HENT:      Head: Normocephalic. Cardiovascular:      Rate and Rhythm: Normal rate and regular rhythm. Pulmonary:      Effort: Pulmonary effort is normal. No respiratory distress. Breath sounds: Normal breath sounds. No wheezing. Abdominal:      General: Bowel sounds are normal. There is no distension. Palpations: Abdomen is soft. There is no mass. Tenderness: There is no abdominal tenderness. There is no guarding or rebound. Skin:     General: Skin is warm and dry. Neurological:      Mental Status: He is alert and oriented to person, place, and time. Assessment and Plan:  Nancy Pelaez was seen today for 1 month follow-up and other. Diagnoses and all orders for this visit:    Difficulty attaining erection  -    Well controlled PRN without side effects  - He would like to continue on this regimen   - tadalafil (CIALIS) 5 MG tablet; Take 5 mg at least 30 minutes prior to anticipated sexual activity as one single dose and not more than once daily- refilled today    Mixed hyperlipidemia   - Labs reviewed with the pt. - Lifestyle modifications such as exercise and healthy diet encouraged and reviewed with the pt. - States he was drinking 3-4 cans of pepsi a day. Cutting down. - Pt education handout on hyperlipidemia reviewed with the pt. Strongly encouraged pt to schedule colonscopy. Risks vs benefits reviewed.  Message sent to care coordination to f/u since pt wont be seen for 11 months. Return in about 11 months (around 6/13/2023) for annual physical exam, or sooner if needed. Pt will call if symptoms worsen or fail to improve. All questions answered. Pt states no further questions or concerns at this time.    Electronically signed by: JEAN CLAUDE Durham CNP 07/13/22

## 2022-07-14 ENCOUNTER — ANTI-COAG VISIT (OUTPATIENT)
Dept: PHARMACY | Age: 52
End: 2022-07-14
Payer: COMMERCIAL

## 2022-07-14 DIAGNOSIS — I82.91 CHRONIC EMBOLISM AND THROMBOSIS OF UNSPECIFIED VEIN: ICD-10-CM

## 2022-07-14 DIAGNOSIS — Z79.01 LONG TERM (CURRENT) USE OF ANTICOAGULANTS: Primary | ICD-10-CM

## 2022-07-14 LAB — INTERNATIONAL NORMALIZATION RATIO, POC: 3.9

## 2022-07-14 PROCEDURE — 85610 PROTHROMBIN TIME: CPT

## 2022-07-14 PROCEDURE — 99211 OFF/OP EST MAY X REQ PHY/QHP: CPT

## 2022-07-14 NOTE — PROGRESS NOTES
drinks. Aware of the impact of alcohol. States he has not had alcohol in about a month. ---> some recently but nothing over weekend --> states that he had large amount of alcohol over weekend, 3-4 shots --> alcohol 4-5 days ago --> inc EtOH d/t camping. Drink 2 days ago. --> none --> had partied over the weekend but was driving so didn't drink as much --> drink on Friday --> had some alcohol last night   []   [x]       Change in activity  --> back to gym now   []   [x]       Hospital admission  []   [x]       Emergency department visit  [x]   []       Other complaints discussed DOACs in comparison to warfarin --> discussed there is no non-pharmacologic way to thin blood      Clinical Outcomes:  Yes     No   []   [x]       Major bleeding event  []   [x]       Thromboembolic event  Takes warfarin in AM  Duration of warfarin Therapy: indefinitely    INR Range:  2.0-3.0  Cautious when holding warfarin--INR drops quickly     Patient is generally rarely in range. He fluctuates on his dose due to diet inconsistencies and alcohol. He self- doses and adjusts based on what he has been drinking. Non-compliant with apts     INR is 3.9 today d/t acute alcohol   Hold dose tonight then continue dose of 3.75 mg on Sat and 7.5 mg all other days of the week. Encouraged to maintain a consistency of vegetables/salads.   Recheck INR in 2 weeks, 7/28    Does not need pw printed     * Consent form signed on 11/17*    Referring is Dr. Zuri Berrios - REF updated 4/15/21  INR (no units)   Date Value   04/15/2021 4.43 (H)   09/20/2018 2.1   08/17/2018 1.9   07/06/2018 2     INR,(POC) (no units)   Date Value   06/22/2022 2.3   06/08/2022 3.3   04/28/2022 4.3   04/07/2022 2.3       For Pharmacy Admin Tracking Only     Intervention Detail: Dose Adjustment: 1, reason: Therapy Optimization   Total # of Interventions Recommended: 1   Total # of Interventions Accepted: 1   Time Spent (min): 15

## 2022-07-29 ENCOUNTER — CARE COORDINATION (OUTPATIENT)
Dept: CARE COORDINATION | Age: 52
End: 2022-07-29

## 2022-07-29 NOTE — CARE COORDINATION
RN-CC received call return from pt. He has not scheduled a colonoscopy. He would like to schedule after 8/20/22. RN-CC outreached [facility] to assist pt w/scheduling. Unfortunately, there were no schedulers available to assist at the time of my call. Per [facility], a  will contact the pt to schedule colonoscopy screening.

## 2022-07-29 NOTE — CARE COORDINATION
RN-CC attempted to outreach pt regarding scheduling colonoscopy. No answer; HIPPA compliant message left through . Call return requested. RN-CC will f/u at later date/time.

## 2022-08-04 ENCOUNTER — ANTI-COAG VISIT (OUTPATIENT)
Dept: PHARMACY | Age: 52
End: 2022-08-04
Payer: COMMERCIAL

## 2022-08-04 DIAGNOSIS — Z79.01 LONG TERM (CURRENT) USE OF ANTICOAGULANTS: Primary | ICD-10-CM

## 2022-08-04 DIAGNOSIS — I82.91 CHRONIC EMBOLISM AND THROMBOSIS OF UNSPECIFIED VEIN: ICD-10-CM

## 2022-08-04 LAB — INTERNATIONAL NORMALIZATION RATIO, POC: 1.4

## 2022-08-04 PROCEDURE — 99212 OFFICE O/P EST SF 10 MIN: CPT

## 2022-08-04 PROCEDURE — 85610 PROTHROMBIN TIME: CPT

## 2022-08-04 NOTE — PROGRESS NOTES
Mr. Rosa Maria Banks is a 47 y.o. y/o male with history of pulmonary embolism with infarction. He has been taking warfarin since 2001   He presents today for anticoagulation monitoring and adjustment. Pertinent PMH: had a DVT and two pulmonary e/mbolism. (+) hepatitis B (2/2016)  Patient Reported Findings:  Yes     No  [x]   []       Patient verifies current dosing regimen as listed- confirms   []   [x]       S/S bleeding/bruising/swelling/SOB- denies   []   [x]       Blood in urine or stool- denies ---> bloody nose   [x]   []       Procedures scheduled in the future at this time- possible colonoscopy, not yet scheduled  []   [x]       Missed Dose  Denies   []   [x]       Extra Dose -denies   [x]   []       Change in medications -denies, started centrum for men 9/19 (does contain vit K) --> took Theraflu on Tues, Wed, Thur of last week --> had tylenol the last few days---> tadalafil --> took theraflu for a day   [x]   []       Change in health/diet/appetite-- reports he is back in the gym and  has been having green smoothies daily and sal/ad ~4x/week. (This has been his daily vit k intake for ~ 3 days now) plans to continue as such. --> has increased green intake to green smoothie twice a day---> Eating all plant based diet. --> eating greens every day in juice --> having juice every morning and then serving of vit k every day as well --> had not been eating greens for 3 weeks --> back to gym, will be having greens daily now --> greens off and on currently, working ot be consistent --> has been back to normal with vit k for a few days then none for pat few days d/t feeling ill --> V8 instead of greens in drink, back to feeling better  --> no vit k since end of last month--->V8 in protein drink --> having 4 cups of broccoli/day for week.  Has not had any protein drinks since last week but is planning to resume. ---> V8 daily but no greens --> no vit k recently   [x]   []       Change in alcohol use- Pt holds 1/2 dose of warfarin on nights he drinks. Aware of the impact of alcohol. States he has not had alcohol in about a month. ---> some recently but nothing over weekend --> states that he had large amount of alcohol over weekend, 3-4 shots --> alcohol 4-5 days ago --> inc EtOH d/t camping. Drink 2 days ago. --> none --> had partied over the weekend but was driving so didn't drink as much --> drink on Friday --> had some alcohol last night   []   [x]       Change in activity  --> back to gym now   []   [x]       Hospital admission  []   [x]       Emergency department visit  [x]   []       Other complaints discussed DOACs in comparison to warfarin --> discussed there is no non-pharmacologic way to thin blood      Clinical Outcomes:  Yes     No   []   [x]       Major bleeding event  []   [x]       Thromboembolic event  Takes warfarin in AM  Duration of warfarin Therapy: indefinitely    INR Range:  2.0-3.0  Cautious when holding warfarin--INR drops quickly     Patient is generally rarely in range. He fluctuates on his dose due to diet inconsistencies and alcohol. He self- doses and adjusts based on what he has been drinking. Non-compliant with apts     INR is 1.4 today   Take 11.25 mg tonight then continue dose of 3.75 mg on Sat and 7.5 mg all other days of the week   Encouraged to maintain a consistency of vegetables/salads.   Recheck INR in 2 weeks, 8/18    Does not need pw printed     * Consent form signed on 11/17*    Referring is Dr. Ivette Libman - REF updated 4/15/21  INR (no units)   Date Value   04/15/2021 4.43 (H)   09/20/2018 2.1   08/17/2018 1.9   07/06/2018 2     INR,(POC) (no units)   Date Value   07/14/2022 3.9   06/22/2022 2.3   06/08/2022 3.3   04/28/2022 4.3       For Pharmacy Admin Tracking Only    Intervention Detail: Dose Adjustment: 1, reason: Therapy Optimization  Total # of Interventions Recommended: 1  Total # of Interventions Accepted: 1  Time Spent (min): 15

## 2022-08-15 ENCOUNTER — CARE COORDINATION (OUTPATIENT)
Dept: CARE COORDINATION | Age: 52
End: 2022-08-15

## 2022-08-15 NOTE — CARE COORDINATION
RN-CC attempted to outreach pt to follow up on scheduling colonoscopy. No answer; HIPPA compliant message left through VM. Call return requested. RN-CC will follow up at later date/time.

## 2022-08-17 ENCOUNTER — TELEPHONE (OUTPATIENT)
Dept: PHARMACY | Age: 52
End: 2022-08-17

## 2022-08-19 DIAGNOSIS — N52.9 DIFFICULTY ATTAINING ERECTION: ICD-10-CM

## 2022-08-19 RX ORDER — TADALAFIL 5 MG/1
TABLET ORAL
Qty: 30 TABLET | Refills: 0 | Status: SHIPPED | OUTPATIENT
Start: 2022-08-19

## 2022-08-26 ENCOUNTER — ANTI-COAG VISIT (OUTPATIENT)
Dept: PHARMACY | Age: 52
End: 2022-08-26
Payer: COMMERCIAL

## 2022-08-26 DIAGNOSIS — I82.91 CHRONIC EMBOLISM AND THROMBOSIS OF UNSPECIFIED VEIN: ICD-10-CM

## 2022-08-26 DIAGNOSIS — Z79.01 LONG TERM (CURRENT) USE OF ANTICOAGULANTS: Primary | ICD-10-CM

## 2022-08-26 LAB — INTERNATIONAL NORMALIZATION RATIO, POC: 2.7

## 2022-08-26 PROCEDURE — 85610 PROTHROMBIN TIME: CPT

## 2022-08-26 PROCEDURE — 99211 OFF/OP EST MAY X REQ PHY/QHP: CPT

## 2022-08-26 NOTE — PROGRESS NOTES
Mr. Kiki Conrad is a 47 y.o. y/o male with history of pulmonary embolism with infarction. He has been taking warfarin since 2001   He presents today for anticoagulation monitoring and adjustment. Pertinent PMH: had a DVT and two pulmonary e/mbolism. (+) hepatitis B (2/2016)  Patient Reported Findings:  Yes     No  [x]   []       Patient verifies current dosing regimen as listed- confirms   []   [x]       S/S bleeding/bruising/swelling/SOB- denies   []   [x]       Blood in urine or stool- denies ---> bloody nose   [x]   []       Procedures scheduled in the future at this time- possible colonoscopy, not yet scheduled, will call when colonoscopy scheduled  []   [x]       Missed Dose  Denies   []   [x]       Extra Dose -denies   [x]   []       Change in medications -denies, started centrum for men 9/19 (does contain vit K) --> took Theraflu on Tues, Wed, Thur of last week --> had tylenol the last few days---> tadalafil --> took theraflu for a day-->no new changes  [x]   []       Change in health/diet/appetite-- reports he is back in the gym and  has been having green smoothies daily and sal/ad ~4x/week. (This has been his daily vit k intake for ~ 3 days now) plans to continue as such. --> has increased green intake to green smoothie twice a day---> Eating all plant based diet. --> eating greens every day in juice --> having juice every morning and then serving of vit k every day as well --> had not been eating greens for 3 weeks --> back to gym, will be having greens daily now --> greens off and on currently, working ot be consistent --> has been back to normal with vit k for a few days then none for pat few days d/t feeling ill --> V8 instead of greens in drink, back to feeling better  --> no vit k since end of last month--->V8 in protein drink --> having 4 cups of broccoli/day for week.  Has not had any protein drinks since last week but is planning to resume. ---> V8 daily but no greens --> no vit k recently--> will be trying to do a healthy eating challenge and eating more greens including kale/spinach  [x]   []       Change in alcohol use- Pt holds 1/2 dose of warfarin on nights he drinks. Aware of the impact of alcohol. States he has not had alcohol in about a month. ---> some recently but nothing over weekend --> states that he had large amount of alcohol over weekend, 3-4 shots --> alcohol 4-5 days ago --> inc EtOH d/t camping. Drink 2 days ago. --> none --> had partied over the weekend but was driving so didn't drink as much --> drink on Friday --> had some alcohol last night--> recent camping with heavy drinking but has not drank since last weekend  []   [x]       Change in activity  --> back to gym now   []   [x]       Hospital admission  []   [x]       Emergency department visit  [x]   []       Other complaints discussed DOACs in comparison to warfarin --> discussed there is no non-pharmacologic way to thin blood      Clinical Outcomes:  Yes     No   []   [x]       Major bleeding event  []   [x]       Thromboembolic event  Takes warfarin in AM  Duration of warfarin Therapy: indefinitely    INR Range:  2.0-3.0  Cautious when holding warfarin--INR drops quickly     Patient is generally rarely in range. He fluctuates on his dose due to diet inconsistencies and alcohol. He self- doses and adjusts based on what he has been drinking. Non-compliant with apts    INR is 2.7 today   Patient states he will be starting new diet with more greens, plan to follow up in 2 weeks due to this  Continue dose of 3.75 mg on Sat and 7.5 mg all other days of the week  Encouraged to maintain a consistency of vegetables/salads.   Recheck INR in 2 weeks, 9/9/22    Does not need pw printed     * Consent form signed on 11/17*    Referring is Dr. Jazlyn Ruano - REF updated 4/15/21  INR (no units)   Date Value   04/15/2021 4.43 (H)   09/20/2018 2.1   08/17/2018 1.9   07/06/2018 2     INR,(POC) (no units)   Date Value   08/04/2022 1.4 2022 3.9   2022 2.3   2022 3.3     For Pharmacy Admin Tracking Only    Intervention Detail: Adherence Monitorin  Total # of Interventions Recommended: 1  Total # of Interventions Accepted: 1  Time Spent (min): 15

## 2022-09-06 ENCOUNTER — TELEPHONE (OUTPATIENT)
Dept: PHARMACY | Age: 52
End: 2022-09-06

## 2022-09-08 ENCOUNTER — TELEPHONE (OUTPATIENT)
Dept: PHARMACY | Age: 52
End: 2022-09-08

## 2022-09-08 NOTE — TELEPHONE ENCOUNTER
Pt called to cancel appt w/ CC on 9/9 his needs to get a new transmission in his car, R/S on 9/22 states it'll be at least 2 wks until he can get it fixed .

## 2022-09-21 ENCOUNTER — CARE COORDINATION (OUTPATIENT)
Dept: CARE COORDINATION | Age: 52
End: 2022-09-21

## 2022-09-21 NOTE — CARE COORDINATION
Attempt #2. RN-CC attempted to outreach pt to follow up on scheduling colonoscopy. No answer; HIPPA compliant message left through VM. Call return requested. RN-CC will follow up at later date/time. Per telephone encounter dated 9/6 it appears patient needs cardiac clearance:    Calling to get MD info for cardiac clearance for colonoscopy. MD Alesia Mcmahon will contact Dr. Emily Alvarez office.

## 2022-09-22 ENCOUNTER — ANTI-COAG VISIT (OUTPATIENT)
Dept: PHARMACY | Age: 52
End: 2022-09-22
Payer: COMMERCIAL

## 2022-09-22 DIAGNOSIS — Z79.01 LONG TERM (CURRENT) USE OF ANTICOAGULANTS: Primary | ICD-10-CM

## 2022-09-22 DIAGNOSIS — I82.91 CHRONIC EMBOLISM AND THROMBOSIS OF UNSPECIFIED VEIN: ICD-10-CM

## 2022-09-22 LAB — INTERNATIONAL NORMALIZATION RATIO, POC: 1.5

## 2022-09-22 PROCEDURE — 99212 OFFICE O/P EST SF 10 MIN: CPT

## 2022-09-22 PROCEDURE — 85610 PROTHROMBIN TIME: CPT

## 2022-09-22 NOTE — PROGRESS NOTES
Mr. Giles Oates is a 47 y.o. y/o male with history of pulmonary embolism with infarction. He has been taking warfarin since 2001   He presents today for anticoagulation monitoring and adjustment. Pertinent PMH: had a DVT and two pulmonary e/mbolism. (+) hepatitis B (2/2016)  Patient Reported Findings:  Yes     No  [x]   []       Patient verifies current dosing regimen as listed- confirms   []   [x]       S/S bleeding/bruising/swelling/SOB- denies   []   [x]       Blood in urine or stool- denies ---> bloody nose   [x]   []       Procedures scheduled in the future at this time- possible colonoscopy, not yet scheduled, will call when colonoscopy scheduled---> Oct 6th, no holding instructions or clearance yet but states will not do lovenox so most likely just hold 5 days   []   [x]       Missed Dose  three doses- Sun, Mon, Tues   []   [x]       Extra Dose -denies   [x]   []       Change in medications -denies, started centrum for men 9/19 (does contain vit K) --> took Theraflu on Tues, Wed, Thur of last week --> had tylenol the last few days---> tadalafil --> took theraflu for a day-->no new changes  [x]   []       Change in health/diet/appetite-- reports he is back in the gym and  has been having green smoothies daily and sal/ad ~4x/week. (This has been his daily vit k intake for ~ 3 days now) plans to continue as such. --> has increased green intake to green smoothie twice a day---> Eating all plant based diet.  --> eating greens every day in juice --> having juice every morning and then serving of vit k every day as well --> had not been eating greens for 3 weeks --> back to gym, will be having greens daily now --> greens off and on currently, working ot be consistent --> has been back to normal with vit k for a few days then none for pat few days d/t feeling ill --> V8 instead of greens in drink, back to feeling better  --> no vit k since end of last month--->V8 in protein drink --> having 4 cups of broccoli/day for week. Has not had any protein drinks since last week but is planning to resume. ---> V8 daily but no greens --> no vit k recently--> will be trying to do a healthy eating challenge and eating more greens including kale/spinach---> did not start new diet   [x]   []       Change in alcohol use- Pt holds 1/2 dose of warfarin on nights he drinks. Aware of the impact of alcohol. States he has not had alcohol in about a month. ---> some recently but nothing over weekend --> states that he had large amount of alcohol over weekend, 3-4 shots --> alcohol 4-5 days ago --> inc EtOH d/t camping. Drink 2 days ago. --> none --> had partied over the weekend but was driving so didn't drink as much --> drink on Friday --> had some alcohol last night--> recent camping with heavy drinking but has not drank since last weekend---> drinking heavily recently   []   [x]       Change in activity  --> back to gym now   []   [x]       Hospital admission  []   [x]       Emergency department visit  [x]   []       Other complaints discussed DOACs in comparison to warfarin --> discussed there is no non-pharmacologic way to thin blood      Clinical Outcomes:  Yes     No   []   [x]       Major bleeding event  []   [x]       Thromboembolic event  Takes warfarin in AM  Duration of warfarin Therapy: indefinitely    INR Range:  2.0-3.0  Cautious when holding warfarin--INR drops quickly     Patient is generally rarely in range. He fluctuates on his dose due to diet inconsistencies and alcohol. He self- doses and adjusts based on what he has been drinking. Non-compliant with apts. INR is 1.5 today after holding 3 doses dt heavy drinking. Back to normal now. Will hold off on new diet. Take 11.245mg tonight then continue dose of 3.75 mg on Sat and 7.5 mg all other days of the week.   Procedure 10/6, assuming you have to hold for 5 days and not do lovenox injections: start holding Sat 10/1 for 5 days, resume warfarin 10/6 and take 11.25mg then continue normal dose. Call and ask about holding instructions and then let me know. Encouraged to maintain a consistency of vegetables/salads.   Recheck INR in 3 weeks, 10/14- 1 week after procedure     Does not need pw printed     * Consent form signed on *    Referring is Dr. Casper Huang - REF updated 4/15/21  INR (no units)   Date Value   04/15/2021 4.43 (H)   2018 2.1   2018 1.9   2018 2     INR,(POC) (no units)   Date Value   2022 1.5   2022 2.7   2022 1.4   2022 3.9     For Pharmacy Admin Tracking Only    Intervention Detail: Adherence Monitorin and Dose Adjustment: 1, reason: Therapy De-escalation, Therapy Optimization  Total # of Interventions Recommended: 2  Total # of Interventions Accepted: 2  Time Spent (min): 15

## 2022-10-13 ENCOUNTER — TELEPHONE (OUTPATIENT)
Dept: PHARMACY | Age: 52
End: 2022-10-13

## 2022-10-13 NOTE — TELEPHONE ENCOUNTER
LVM for patient with update appt date/time. Thursday, 10/27 @ 8am.  Requested a return call if this this reschedule doesn't work for him.

## 2022-10-14 ENCOUNTER — APPOINTMENT (OUTPATIENT)
Dept: PHARMACY | Age: 52
End: 2022-10-14
Payer: COMMERCIAL

## 2022-10-20 ENCOUNTER — CARE COORDINATION (OUTPATIENT)
Dept: CARE COORDINATION | Age: 52
End: 2022-10-20

## 2022-10-27 ENCOUNTER — ANTI-COAG VISIT (OUTPATIENT)
Dept: PHARMACY | Age: 52
End: 2022-10-27
Payer: COMMERCIAL

## 2022-10-27 DIAGNOSIS — I82.91 CHRONIC EMBOLISM AND THROMBOSIS OF UNSPECIFIED VEIN: ICD-10-CM

## 2022-10-27 DIAGNOSIS — Z79.01 LONG TERM (CURRENT) USE OF ANTICOAGULANTS: Primary | ICD-10-CM

## 2022-10-27 LAB — INTERNATIONAL NORMALIZATION RATIO, POC: 3.5

## 2022-10-27 PROCEDURE — 99211 OFF/OP EST MAY X REQ PHY/QHP: CPT

## 2022-10-27 PROCEDURE — 85610 PROTHROMBIN TIME: CPT

## 2022-10-27 NOTE — PROGRESS NOTES
Mr. Jackie Bai is a 47 y.o. y/o male with history of pulmonary embolism with infarction. He has been taking warfarin since 2001   He presents today for anticoagulation monitoring and adjustment. Pertinent PMH: had a DVT and two pulmonary e/mbolism. (+) hepatitis B (2/2016)  Patient Reported Findings:  Yes     No  [x]   []       Patient verifies current dosing regimen as listed- confirms   []   [x]       S/S bleeding/bruising/swelling/SOB- denies   []   [x]       Blood in urine or stool- denies ---> bloody nose   [x]   []       Procedures scheduled in the future at this time- possible colonoscopy, not yet scheduled, will call when colonoscopy scheduled---> Oct 6th, no holding instructions or clearance yet but states will not do lovenox so most likely just hold 5 days --> cancelled d/t car trouble, plans to R/S   []   [x]       Missed Dose  denies  []   [x]       Extra Dose -denies   []   [x]       Change in medications -denies, started centrum for men 9/19 (does contain vit K) --> took Theraflu on Tues, Wed, Thur of last week --> had tylenol the last few days---> no new changes  [x]   []       Change in health/diet/appetite-- reports he is back in the gym and  has been having green smoothies daily and sal/ad ~4x/week. (This has been his daily vit k intake for ~ 3 days now) plans to continue as such. --> has increased green intake to green smoothie twice a day---> Eating all plant based diet. --> eating greens every day in juice --> having juice every morning and then serving of vit k every day as well --> had not been eating greens for 3 weeks --> back to gym, will be having greens daily now --> greens off and on currently, working ot be consistent --> has been back to normal with vit k for a few days then none for pat few days d/t feeling ill --> V8 instead of greens in drink, back to feeling better  --> no vit k since end of last month--->V8 in protein drink --> having 4 cups of broccoli/day for week.  Has not *    Referring is Dr. Bear Pearson - REF updated 4/15/21  INR (no units)   Date Value   04/15/2021 4.43 (H)   2018 2.1   2018 1.9   2018 2     INR,(POC) (no units)   Date Value   2022 1.5   2022 2.7   2022 1.4   2022 3.9     For Pharmacy Admin Tracking Only    Intervention Detail: Adherence Monitorin and Dose Adjustment: 1, reason: Therapy De-escalation, Therapy Optimization  Total # of Interventions Recommended: 2  Total # of Interventions Accepted: 2  Time Spent (min): 15

## 2022-11-10 ENCOUNTER — TELEPHONE (OUTPATIENT)
Dept: PHARMACY | Age: 52
End: 2022-11-10

## 2022-11-10 NOTE — TELEPHONE ENCOUNTER
Pt lvm asking to cancel appt w/cc today, and r/s next Thursday (11/17) same time. R/s on 11/17 pt will need to confirm appt.

## 2022-11-28 ENCOUNTER — TELEPHONE (OUTPATIENT)
Dept: PHARMACY | Age: 52
End: 2022-11-28

## 2022-11-30 DIAGNOSIS — N52.9 DIFFICULTY ATTAINING ERECTION: ICD-10-CM

## 2022-11-30 RX ORDER — TADALAFIL 5 MG/1
TABLET ORAL
Qty: 30 TABLET | Refills: 0 | Status: SHIPPED | OUTPATIENT
Start: 2022-11-30

## 2022-11-30 RX ORDER — TADALAFIL 5 MG/1
TABLET ORAL
Qty: 30 TABLET | Refills: 0 | OUTPATIENT
Start: 2022-11-30

## 2022-11-30 NOTE — TELEPHONE ENCOUNTER
Future Appointments    Encounter Information    Provider Department Appt Notes   6/13/2023 Edenilson Marks, [address] Internal Medicine 11 months for annual physical exam     Past Visits    Date Provider Specialty Visit Type Primary Dx   07/13/2022 Edenilson Marks, [address] [facility] Internal Medicine Office Visit Difficulty attaining erection

## 2022-12-02 ENCOUNTER — ANTI-COAG VISIT (OUTPATIENT)
Dept: PHARMACY | Age: 52
End: 2022-12-02
Payer: COMMERCIAL

## 2022-12-02 DIAGNOSIS — I82.91 CHRONIC EMBOLISM AND THROMBOSIS OF UNSPECIFIED VEIN: ICD-10-CM

## 2022-12-02 DIAGNOSIS — Z79.01 LONG TERM (CURRENT) USE OF ANTICOAGULANTS: Primary | ICD-10-CM

## 2022-12-02 LAB — INTERNATIONAL NORMALIZATION RATIO, POC: 3.2

## 2022-12-02 PROCEDURE — 99211 OFF/OP EST MAY X REQ PHY/QHP: CPT

## 2022-12-02 PROCEDURE — 85610 PROTHROMBIN TIME: CPT

## 2022-12-02 NOTE — PROGRESS NOTES
Mr. Norbert Romero is a 47 y.o. y/o male with history of pulmonary embolism with infarction. He has been taking warfarin since 2001   He presents today for anticoagulation monitoring and adjustment. Pertinent PMH: had a DVT and two pulmonary e/mbolism. (+) hepatitis B (2/2016)  Patient Reported Findings:  Yes     No  [x]   []       Patient verifies current dosing regimen as listed- confirms   []   [x]       S/S bleeding/bruising/swelling/SOB- denies   []   [x]       Blood in urine or stool- denies ---> bloody nose---> none    [x]   []       Procedures scheduled in the future at this time- possible colonoscopy, not yet scheduled, will call when colonoscopy scheduled---> Oct 6th, no holding instructions or clearance yet but states will not do lovenox so most likely just hold 5 days --> cancelled d/t car trouble---> plans to R/S   []   [x]       Missed Dose  may have missed yesterday   []   [x]       Extra Dose -denies   []   [x]       Change in medications -denies, started centrum for men 9/19 (does contain vit K) --> took Theraflu on Tues, Wed, Thur of last week --> had tylenol the last few days---> no new changes  [x]   []       Change in health/diet/appetite-- reports he is back in the gym and  has been having green smoothies daily and sal/ad ~4x/week. (This has been his daily vit k intake for ~ 3 days now) plans to continue as such. --> has increased green intake to green smoothie twice a day---> Eating all plant based diet.  --> eating greens every day in juice --> having juice every morning and then serving of vit k every day as well --> had not been eating greens for 3 weeks --> back to gym, will be having greens daily now --> greens off and on currently, working ot be consistent --> has been back to normal with vit k for a few days then none for pat few days d/t feeling ill --> V8 instead of greens in drink, back to feeling better  --> no vit k since end of last month--->V8 in protein drink --> having 4 cups of broccoli/day for week. Has not had any protein drinks since last week but is planning to resume. ---> V8 daily but no greens --> no vit k recently--> will be trying to do a healthy eating challenge and eating more greens including kale/spinach---> did not start new diet --> less vit k   []   [x]       Change in alcohol use- Pt holds 1/2 dose of warfarin on nights he drinks. Aware of the impact of alcohol. States he has not had alcohol in about a month. ---> some recently but nothing over weekend --> states that he had large amount of alcohol over weekend, 3-4 shots --> alcohol 4-5 days ago --> inc EtOH d/t camping. Drink 2 days ago. --> none --> had partied over the weekend but was driving so didn't drink as much --> drink on Friday --> had some alcohol last night--> recent camping with heavy drinking but has not drank since last weekend---> drinking heavily recently --> no changes ---> had some alcohol recently   []   [x]       Change in activity  --> back to gym now   []   [x]       Hospital admission  []   [x]       Emergency department visit  [x]   []       Other complaints discussed DOACs in comparison to warfarin --> discussed there is no non-pharmacologic way to thin blood      Clinical Outcomes:  Yes     No   []   [x]       Major bleeding event  []   [x]       Thromboembolic event  Takes warfarin in AM  Duration of warfarin Therapy: indefinitely    INR Range:  2.0-3.0  Cautious when holding warfarin--INR drops quickly     Patient is generally rarely in range. He fluctuates on his dose due to diet inconsistencies and alcohol. He self- doses and adjusts based on what he has been drinking. Non-compliant with apts. INR is 3.2 today   Had acute alcohol, maybe less greens. Not sure if he took yesterday's dose. Will continue on normal dose and reassess in 2 weeks. Continue dose of 3.75 mg on Sat and 7.5 mg all other days of the week. Encouraged to maintain a consistency of vegetables/salads.   Recheck INR in 2 weeks,     Does not need pw printed     * Consent form signed on *    Referring is Dr. Elen Grijalva - REF updated 4/15/21  INR (no units)   Date Value   04/15/2021 4.43 (H)   2018 2.1   2018 1.9   2018 2     INR,(POC) (no units)   Date Value   2022 3.2   10/27/2022 3.5   2022 1.5   2022 2.7     For Pharmacy Admin Tracking Only    Intervention Detail: Adherence Monitorin  Total # of Interventions Recommended: 1  Total # of Interventions Accepted: 1  Time Spent (min): 15

## 2022-12-16 ENCOUNTER — TELEPHONE (OUTPATIENT)
Dept: PHARMACY | Age: 52
End: 2022-12-16

## 2022-12-16 ENCOUNTER — APPOINTMENT (OUTPATIENT)
Dept: PHARMACY | Age: 52
End: 2022-12-16
Payer: COMMERCIAL

## 2022-12-16 NOTE — TELEPHONE ENCOUNTER
Pt lvm asking to cancel [facility] appt today and r/s for same time next week. Closed next Fri. R/s pt on 12/22 @ 855 . Pt will need to confirm this appt.

## 2022-12-22 ENCOUNTER — ANTI-COAG VISIT (OUTPATIENT)
Dept: PHARMACY | Age: 52
End: 2022-12-22
Payer: COMMERCIAL

## 2022-12-22 DIAGNOSIS — I82.91 CHRONIC EMBOLISM AND THROMBOSIS OF UNSPECIFIED VEIN: ICD-10-CM

## 2022-12-22 DIAGNOSIS — Z79.01 LONG TERM (CURRENT) USE OF ANTICOAGULANTS: Primary | ICD-10-CM

## 2022-12-22 LAB — INTERNATIONAL NORMALIZATION RATIO, POC: 2.1

## 2022-12-22 PROCEDURE — 99211 OFF/OP EST MAY X REQ PHY/QHP: CPT

## 2022-12-22 PROCEDURE — 85610 PROTHROMBIN TIME: CPT

## 2022-12-22 NOTE — PROGRESS NOTES
Mr. Kylee Godinez is a 47 y.o. y/o male with history of pulmonary embolism with infarction. He has been taking warfarin since 2001   He presents today for anticoagulation monitoring and adjustment. Pertinent PMH: had a DVT and two pulmonary e/mbolism. (+) hepatitis B (2/2016)  Patient Reported Findings:  Yes     No  [x]   []       Patient verifies current dosing regimen as listed- confirms   []   [x]       S/S bleeding/bruising/swelling/SOB- denies   []   [x]       Blood in urine or stool- denies ---> bloody nose---> none    [x]   []       Procedures scheduled in the future at this time- possible colonoscopy, not yet scheduled, will call when colonoscopy scheduled---> Oct 6th, no holding instructions or clearance yet but states will not do lovenox so most likely just hold 5 days --> cancelled d/t car trouble---> plans to R/S   []   [x]       Missed Dose  unsure  []   [x]       Extra Dose -denies   []   [x]       Change in medications -denies, started centrum for men 9/19 (does contain vit K) --> took Theraflu on Tues, Wed, Thur of last week --> had tylenol the last few days---> no new changes  [x]   []       Change in health/diet/appetite-- reports he is back in the gym and  has been having green smoothies daily and sal/ad ~4x/week. (This has been his daily vit k intake for ~ 3 days now) plans to continue as such. --> has increased green intake to green smoothie twice a day---> Eating all plant based diet.  --> eating greens every day in juice --> having juice every morning and then serving of vit k every day as well --> had not been eating greens for 3 weeks --> back to gym, will be having greens daily now --> greens off and on currently, working ot be consistent --> has been back to normal with vit k for a few days then none for pat few days d/t feeling ill --> V8 instead of greens in drink, back to feeling better  --> no vit k since end of last month--->V8 in protein drink --> having 4 cups of broccoli/day for week. Has not had any protein drinks since last week but is planning to resume. ---> V8 daily but no greens --> no vit k recently--> will be trying to do a healthy eating challenge and eating more greens including kale/spinach---> did not start new diet --> less vit k --> no vit k this week   []   [x]       Change in alcohol use- Pt holds 1/2 dose of warfarin on nights he drinks. Aware of the impact of alcohol. States he has not had alcohol in about a month. ---> some recently but nothing over weekend --> states that he had large amount of alcohol over weekend, 3-4 shots --> alcohol 4-5 days ago --> inc EtOH d/t camping. Drink 2 days ago. --> none --> had partied over the weekend but was driving so didn't drink as much --> drink on Friday --> had some alcohol last night--> recent camping with heavy drinking but has not drank since last weekend---> drinking heavily recently --> no changes ---> had some alcohol recently   []   [x]       Change in activity  --> back to gym now   []   [x]       Hospital admission  []   [x]       Emergency department visit  [x]   []       Other complaints discussed DOACs in comparison to warfarin --> discussed there is no non-pharmacologic way to thin blood      Clinical Outcomes:  Yes     No   []   [x]       Major bleeding event  []   [x]       Thromboembolic event  Takes warfarin in AM  Duration of warfarin Therapy: indefinitely    INR Range:  2.0-3.0  Cautious when holding warfarin--INR drops quickly     Patient is generally rarely in range. He fluctuates on his dose due to diet inconsistencies and alcohol. He self- doses and adjusts based on what he has been drinking. Non-compliant with apts. INR is 2.1 today   Continue dose of 3.75 mg on Sat and 7.5 mg all other days of the week. Encouraged to maintain a consistency of vegetables/salads.   Recheck INR in 2 weeks, 1/6/23    Does not need pw printed     * Consent form signed on 11/17*    Referring is Dr. Gavin Scot updated 4/15/21  INR (no units)   Date Value   04/15/2021 4.43 (H)   09/20/2018 2.1   08/17/2018 1.9   07/06/2018 2     INR,(POC) (no units)   Date Value   12/02/2022 3.2   10/27/2022 3.5   09/22/2022 1.5   08/26/2022 2.7     For Pharmacy Admin Tracking Only    Total # of Interventions Recommended: 0  Total # of Interventions Accepted: 0  Time Spent (min): 15

## 2023-01-06 ENCOUNTER — ANTI-COAG VISIT (OUTPATIENT)
Dept: PHARMACY | Age: 53
End: 2023-01-06
Payer: COMMERCIAL

## 2023-01-06 DIAGNOSIS — I82.91 CHRONIC EMBOLISM AND THROMBOSIS OF UNSPECIFIED VEIN: ICD-10-CM

## 2023-01-06 DIAGNOSIS — Z79.01 LONG TERM (CURRENT) USE OF ANTICOAGULANTS: Primary | ICD-10-CM

## 2023-01-06 LAB — INTERNATIONAL NORMALIZATION RATIO, POC: 2.1

## 2023-01-06 PROCEDURE — 85610 PROTHROMBIN TIME: CPT

## 2023-01-06 PROCEDURE — 99211 OFF/OP EST MAY X REQ PHY/QHP: CPT

## 2023-01-06 NOTE — PROGRESS NOTES
Mr. Roverto Kiser is a 47 y.o. y/o male with history of pulmonary embolism with infarction. He has been taking warfarin since 2001   He presents today for anticoagulation monitoring and adjustment. Pertinent PMH: had a DVT and two pulmonary e/mbolism. (+) hepatitis B (2/2016)  Patient Reported Findings:  Yes     No  [x]   []       Patient verifies current dosing regimen as listed- confirms   []   [x]       S/S bleeding/bruising/swelling/SOB- denies   []   [x]       Blood in urine or stool- denies ---> bloody nose---> none    [x]   []       Procedures scheduled in the future at this time- possible colonoscopy, not yet scheduled, will call when colonoscopy scheduled---> Oct 6th, no holding instructions or clearance yet but states will not do lovenox so most likely just hold 5 days --> cancelled d/t car trouble---> plans to R/S --> colonoscopy   [x]   []       Missed Dose  one time this week, on Tuesday  []   [x]       Extra Dose -denies   []   [x]       Change in medications -denies, started centrum for men 9/19 (does contain vit K) --> took Theraflu on Tues, Wed, Thur of last week --> had tylenol the last few days---> no new changes  [x]   []       Change in health/diet/appetite-- reports he is back in the gym and  has been having green smoothies daily and sal/ad ~4x/week. (This has been his daily vit k intake for ~ 3 days now) plans to continue as such. --> has increased green intake to green smoothie twice a day---> Eating all plant based diet.  --> eating greens every day in juice --> having juice every morning and then serving of vit k every day as well --> had not been eating greens for 3 weeks --> back to gym, will be having greens daily now --> greens off and on currently, working ot be consistent --> has been back to normal with vit k for a few days then none for pat few days d/t feeling ill --> V8 instead of greens in drink, back to feeling better  --> no vit k since end of last month--->V8 in protein drink --> having 4 cups of broccoli/day for week. Has not had any protein drinks since last week but is planning to resume. ---> V8 daily but no greens --> no vit k recently--> will be trying to do a healthy eating challenge and eating more greens including kale/spinach---> did not start new diet --> less vit k --> no vit k this week --> some more greens  []   [x]       Change in alcohol use- Pt holds 1/2 dose of warfarin on nights he drinks. Aware of the impact of alcohol. States he has not had alcohol in about a month. ---> some recently but nothing over weekend --> states that he had large amount of alcohol over weekend, 3-4 shots --> alcohol 4-5 days ago --> inc EtOH d/t camping. Drink 2 days ago. --> none --> had partied over the weekend but was driving so didn't drink as much --> drink on Friday --> had some alcohol last night--> recent camping with heavy drinking but has not drank since last weekend---> drinking heavily recently --> no changes ---> had some alcohol recently   []   [x]       Change in activity  --> back to gym now   []   [x]       Hospital admission  []   [x]       Emergency department visit  [x]   []       Other complaints discussed DOACs in comparison to warfarin --> discussed there is no non-pharmacologic way to thin blood      Clinical Outcomes:  Yes     No   []   [x]       Major bleeding event  []   [x]       Thromboembolic event  Takes warfarin in AM  Duration of warfarin Therapy: indefinitely    INR Range:  2.0-3.0  Cautious when holding warfarin--INR drops quickly     Patient is generally rarely in range. He fluctuates on his dose due to diet inconsistencies and alcohol. He self- doses and adjusts based on what he has been drinking. Non-compliant with apts. INR is 2.1 again today. Missed dose this week, but plans to eat some more greens with spinach shakes, for workout routine. Will check in two weeks. Continue dose of 3.75 mg on Sat and 7.5 mg all other days of the week.   Encouraged to maintain a consistency of vegetables/salads.     Recheck INR in 2 weeks, 1/20/23    Does not need pw printed     * Consent form signed on 1/6/2023*    Referring is Dr. Lis Reis - REF updated 4/15/21  INR (no units)   Date Value   04/15/2021 4.43 (H)   09/20/2018 2.1   08/17/2018 1.9   07/06/2018 2     INR,(POC) (no units)   Date Value   12/22/2022 2.1   12/02/2022 3.2   10/27/2022 3.5   09/22/2022 1.5     For Pharmacy Admin Tracking Only    Total # of Interventions Recommended: 0  Total # of Interventions Accepted: 0  Time Spent (min): 15

## 2023-01-20 ENCOUNTER — TELEPHONE (OUTPATIENT)
Dept: PHARMACY | Age: 53
End: 2023-01-20

## 2023-02-03 ENCOUNTER — ANTI-COAG VISIT (OUTPATIENT)
Dept: PHARMACY | Age: 53
End: 2023-02-03
Payer: COMMERCIAL

## 2023-02-03 DIAGNOSIS — I82.91 CHRONIC EMBOLISM AND THROMBOSIS OF UNSPECIFIED VEIN: ICD-10-CM

## 2023-02-03 DIAGNOSIS — Z79.01 LONG TERM (CURRENT) USE OF ANTICOAGULANTS: Primary | ICD-10-CM

## 2023-02-03 LAB — INTERNATIONAL NORMALIZATION RATIO, POC: 2.7

## 2023-02-03 PROCEDURE — 99211 OFF/OP EST MAY X REQ PHY/QHP: CPT

## 2023-02-03 PROCEDURE — 85610 PROTHROMBIN TIME: CPT

## 2023-02-03 NOTE — PROGRESS NOTES
Mr. Radha Pereira is a 47 y.o. y/o male with history of pulmonary embolism with infarction. He has been taking warfarin since 2001   He presents today for anticoagulation monitoring and adjustment. Pertinent PMH: had a DVT and two pulmonary e/mbolism. (+) hepatitis B (2/2016)  Patient Reported Findings:  Yes     No  [x]   []       Patient verifies current dosing regimen as listed- confirms   []   [x]       S/S bleeding/bruising/swelling/SOB- denies   []   [x]       Blood in urine or stool- denies ---> bloody nose---> none    []   [x]       Procedures scheduled in the future at this time- possible colonoscopy, not yet scheduled, will call when colonoscopy scheduled---> Oct 6th, no holding instructions or clearance yet but states will not do lovenox so most likely just hold 5 days --> cancelled d/t car trouble---> plans to R/S --> colonoscopy   [x]   []       Missed Dose  one time this week, on Tuesday --> missed dose on Sun   []   [x]       Extra Dose -denies   []   [x]       Change in medications -denies, started centrum for men 9/19 (does contain vit K) -->  had tylenol the last few days---> no new changes  [x]   []       Change in health/diet/appetite-- reports he is back in the gym and  has been having green smoothies daily and sal/ad ~4x/week. (This has been his daily vit k intake for ~ 3 days now) plans to continue as such. --> has increased green intake to green smoothie twice a day---> Eating all plant based diet.  --> eating greens every day in juice --> having juice every morning and then serving of vit k every day as well --> had not been eating greens for 3 weeks --> back to gym, will be having greens daily now --> greens off and on currently, working ot be consistent --> has been back to normal with vit k for a few days then none for pat few days d/t feeling ill --> V8 instead of greens in drink, back to feeling better  --> no vit k since end of last month--->V8 in protein drink --> having 4 cups of broccoli/day for week. Has not had any protein drinks since last week but is planning to resume. ---> V8 daily but no greens --> no vit k recently--> will be trying to do a healthy eating challenge and eating more greens including kale/spinach---> did not start new diet --> less vit k --> no vit k this week --> some more greens --> has been fasting for month so has not been eating vit k   []   [x]       Change in alcohol use- Pt holds 1/2 dose of warfarin on nights he drinks. Aware of the impact of alcohol. States he has not had alcohol in about a month. ---> some recently but nothing over weekend --> states that he had large amount of alcohol over weekend, 3-4 shots --> alcohol 4-5 days ago --> inc EtOH d/t camping. Drink 2 days ago. --> none --> had partied over the weekend but was driving so didn't drink as much --> drink on Friday --> had some alcohol last night--> recent camping with heavy drinking but has not drank since last weekend---> drinking heavily recently --> no changes ---> had some alcohol recently   []   [x]       Change in activity  --> back to gym now   []   [x]       Hospital admission  []   [x]       Emergency department visit  [x]   []       Other complaints discussed DOACs in comparison to warfarin --> discussed there is no non-pharmacologic way to thin blood      Clinical Outcomes:  Yes     No   []   [x]       Major bleeding event  []   [x]       Thromboembolic event  Takes warfarin in AM  Duration of warfarin Therapy: indefinitely    INR Range:  2.0-3.0  Cautious when holding warfarin--INR drops quickly     Patient is generally rarely in range. He fluctuates on his dose due to diet inconsistencies and alcohol. He self- doses and adjusts based on what he has been drinking. Non-compliant with apts. INR is 2.7 today after missed dose but has been fasting with no vit k   Continue dose of 3.75 mg on Sat and 7.5 mg all other days of the week.   Encouraged to maintain a consistency of vegetables/salads.   Recheck INR in 2 weeks, 2/17    Does not need pw printed     * Consent form signed on 1/6/2023*    Referring is Dr. Felipe Pathak - REF updated 4/15/21  INR (no units)   Date Value   04/15/2021 4.43 (H)   09/20/2018 2.1   08/17/2018 1.9   07/06/2018 2     INR,(POC) (no units)   Date Value   01/06/2023 2.1   12/22/2022 2.1   12/02/2022 3.2   10/27/2022 3.5     For Pharmacy Admin Tracking Only    Total # of Interventions Recommended: 0  Total # of Interventions Accepted: 0  Time Spent (min): 15

## 2023-02-06 DIAGNOSIS — N52.9 DIFFICULTY ATTAINING ERECTION: ICD-10-CM

## 2023-02-07 RX ORDER — TADALAFIL 5 MG/1
TABLET ORAL
Qty: 30 TABLET | Refills: 0 | Status: SHIPPED | OUTPATIENT
Start: 2023-02-07

## 2023-02-16 ENCOUNTER — TELEPHONE (OUTPATIENT)
Dept: PHARMACY | Age: 53
End: 2023-02-16

## 2023-03-03 ENCOUNTER — ANTI-COAG VISIT (OUTPATIENT)
Dept: PHARMACY | Age: 53
End: 2023-03-03
Payer: COMMERCIAL

## 2023-03-03 DIAGNOSIS — Z79.01 LONG TERM (CURRENT) USE OF ANTICOAGULANTS: Primary | ICD-10-CM

## 2023-03-03 DIAGNOSIS — I82.91 CHRONIC EMBOLISM AND THROMBOSIS OF UNSPECIFIED VEIN: ICD-10-CM

## 2023-03-03 LAB — INTERNATIONAL NORMALIZATION RATIO, POC: 3.2

## 2023-03-03 PROCEDURE — 85610 PROTHROMBIN TIME: CPT

## 2023-03-03 PROCEDURE — 99212 OFFICE O/P EST SF 10 MIN: CPT

## 2023-03-03 NOTE — PROGRESS NOTES
Mr. Stephen Kitchen is a 53 y.o. y/o male with history of pulmonary embolism with infarction. He has been taking warfarin since 2001   He presents today for anticoagulation monitoring and adjustment.  Pertinent PMH: had a DVT and two pulmonary e/mbolism. (+) hepatitis B (2/2016)  Patient Reported Findings:  Yes     No  [x]   []       Patient verifies current dosing regimen as listed- confirms   []   [x]       S/S bleeding/bruising/swelling/SOB- denies   []   [x]       Blood in urine or stool- denies ---> bloody nose---> none    []   [x]       Procedures scheduled in the future at this time- possible colonoscopy, not yet scheduled, will call when colonoscopy scheduled---> Oct 6th, no holding instructions or clearance yet but states will not do lovenox so most likely just hold 5 days --> cancelled d/t car trouble---> plans to R/S --> colonoscopy   []   [x]       Missed Dose  one time this week, on Tuesday --> missed dose on Sun --> denies   []   [x]       Extra Dose -denies   []   [x]       Change in medications -denies, started centrum for men 9/19 (does contain vit K) -->  had tylenol the last few days---> no new changes  [x]   []       Change in health/diet/appetite-- reports he is back in the gym and  has been having green smoothies daily and sal/ad ~4x/week. (This has been his daily vit k intake for ~ 3 days now) plans to continue as such. --> has increased green intake to green smoothie twice a day---> Eating all plant based diet. --> eating greens every day in juice --> having juice every morning and then serving of vit k every day as well --> had not been eating greens for 3 weeks --> back to gym, will be having greens daily now --> greens off and on currently, working ot be consistent --> has been back to normal with vit k for a few days then none for pat few days d/t feeling ill --> V8 instead of greens in drink, back to feeling better  --> no vit k since end of last month--->V8 in protein drink --> having 4  cups of broccoli/day for week. Has not had any protein drinks since last week but is planning to resume. ---> V8 daily but no greens --> no vit k recently--> will be trying to do a healthy eating challenge and eating more greens including kale/spinach---> did not start new diet --> less vit k --> no vit k this week --> some more greens --> Starting to fast, says consistent with the greens (same amount) - about 4 salads a week. Is doing intermittent fasting - eat around 6pm.   []   [x]       Change in alcohol use- Pt holds 1/2 dose of warfarin on nights he drinks. Aware of the impact of alcohol. States he has not had alcohol in about a month. ---> some recently but nothing over weekend --> states that he had large amount of alcohol over weekend, 3-4 shots --> alcohol 4-5 days ago --> inc EtOH d/t camping. Drink 2 days ago. --> none --> had partied over the weekend but was driving so didn't drink as much --> drink on Friday --> had some alcohol last night--> recent camping with heavy drinking but has not drank since last weekend---> drinking heavily recently --> no changes ---> had some alcohol recently --> less alcohol   []   [x]       Change in activity  --> back to gym now   []   [x]       Hospital admission  []   [x]       Emergency department visit  [x]   []       Other complaints discussed DOACs in comparison to warfarin --> discussed there is no non-pharmacologic way to thin blood      Clinical Outcomes:  Yes     No   []   [x]       Major bleeding event  []   [x]       Thromboembolic event  Takes warfarin in AM  Duration of warfarin Therapy: indefinitely    INR Range:  2.0-3.0  Cautious when holding warfarin--INR drops quickly     Patient is generally rarely in range. He fluctuates on his dose due to diet inconsistencies and alcohol. He self- doses and adjusts based on what he has been drinking. Non-compliant with apts. INR is 3.2 today - Less food overall (intermittent fasting). Losing weight.  If still high at next visit, consider permanent decrease     Take 3.75 mg today, then continue dose of 3.75 mg on Sat and 7.5 mg all other days of the week. Encouraged to maintain a consistency of vegetables/salads.     Recheck INR in 3 weeks, 2/17    Does not need pw printed     * Consent form signed on 1/6/2023*    Referring is Dr. Niko Wilson - REF updated 4/15/21  INR (no units)   Date Value   04/15/2021 4.43 (H)   09/20/2018 2.1   08/17/2018 1.9   07/06/2018 2     INR,(POC) (no units)   Date Value   02/03/2023 2.7   01/06/2023 2.1   12/22/2022 2.1   12/02/2022 3.2     For Pharmacy Admin Tracking Only    Intervention Detail: Dose Adjustment: 1, reason: Therapy Optimization  Total # of Interventions Recommended: 1  Total # of Interventions Accepted: 1  Time Spent (min): 15

## 2023-03-23 ENCOUNTER — TELEPHONE (OUTPATIENT)
Dept: PHARMACY | Age: 53
End: 2023-03-23

## 2023-03-31 ENCOUNTER — ANTI-COAG VISIT (OUTPATIENT)
Dept: PHARMACY | Age: 53
End: 2023-03-31
Payer: COMMERCIAL

## 2023-03-31 DIAGNOSIS — Z79.01 LONG TERM (CURRENT) USE OF ANTICOAGULANTS: Primary | ICD-10-CM

## 2023-03-31 DIAGNOSIS — I82.91 CHRONIC EMBOLISM AND THROMBOSIS OF UNSPECIFIED VEIN: ICD-10-CM

## 2023-03-31 LAB — INTERNATIONAL NORMALIZATION RATIO, POC: 2.2

## 2023-03-31 PROCEDURE — 99211 OFF/OP EST MAY X REQ PHY/QHP: CPT

## 2023-03-31 PROCEDURE — 85610 PROTHROMBIN TIME: CPT

## 2023-03-31 NOTE — PROGRESS NOTES
apts.    INR is 2.2 today - Did miss 2 doses, however, had significant increase in EtOH use last week. Continue dose of 3.75 mg on Sat and 7.5 mg all other days of the week. Encouraged to maintain a consistency of vegetables/salads.     Recheck INR in 2 weeks, 4/14    Does not need pw printed     * Consent form signed on 1/6/2023*    Referring is Dr. Loki Porras - REF updated 4/15/21  INR (no units)   Date Value   04/15/2021 4.43 (H)   09/20/2018 2.1   08/17/2018 1.9   07/06/2018 2     INR,(POC) (no units)   Date Value   03/03/2023 3.2   02/03/2023 2.7   01/06/2023 2.1   12/22/2022 2.1     For Pharmacy Admin Tracking Only    Total # of Interventions Recommended: 0  Total # of Interventions Accepted: 0  Time Spent (min): 15

## 2023-04-21 ENCOUNTER — ANTI-COAG VISIT (OUTPATIENT)
Dept: PHARMACY | Age: 53
End: 2023-04-21
Payer: COMMERCIAL

## 2023-04-21 DIAGNOSIS — Z79.01 LONG TERM (CURRENT) USE OF ANTICOAGULANTS: Primary | ICD-10-CM

## 2023-04-21 DIAGNOSIS — I82.91 CHRONIC EMBOLISM AND THROMBOSIS OF UNSPECIFIED VEIN: ICD-10-CM

## 2023-04-21 LAB — INTERNATIONAL NORMALIZATION RATIO, POC: 2.1

## 2023-04-21 PROCEDURE — 85610 PROTHROMBIN TIME: CPT

## 2023-04-21 PROCEDURE — 99211 OFF/OP EST MAY X REQ PHY/QHP: CPT

## 2023-04-21 NOTE — PROGRESS NOTES
last month--->V8 in protein drink --> having 4 cups of broccoli/day for week. Has not had any protein drinks since last week but is planning to resume. ---> V8 daily but no greens --> no vit k recently--> will be trying to do a healthy eating challenge and eating more greens including kale/spinach---> did not start new diet --> less vit k --> no vit k this week --> some more greens --> Starting to fast, says consistent with the greens (same amount) - about 4 salads a week. Is doing intermittent fasting - eat around 6pm. ---> no changes   []   [x]       Change in alcohol use- Pt holds 1/2 dose of warfarin on nights he drinks. Aware of the impact of alcohol. States he has not had alcohol in about a month. ---> some recently but nothing over weekend --> states that he had large amount of alcohol over weekend, 3-4 shots --> alcohol 4-5 days ago --> inc EtOH d/t camping. Drink 2 days ago. --> none --> had partied over the weekend but was driving so didn't drink as much --> drink on Friday --> had some alcohol last night--> recent camping with heavy drinking but has not drank since last weekend---> drinking heavily recently --> no changes ---> had some alcohol recently --> less alcohol --> Did have more intake Fri, Sat (heavy use) ---> none recently   []   [x]       Change in activity  --> back to gym now   []   [x]       Hospital admission  []   [x]       Emergency department visit  [x]   []       Other complaints discussed DOACs in comparison to warfarin --> discussed there is no non-pharmacologic way to thin blood      Clinical Outcomes:  Yes     No   []   [x]       Major bleeding event  []   [x]       Thromboembolic event  Takes warfarin in AM  Duration of warfarin Therapy: indefinitely    INR Range:  2.0-3.0  Cautious when holding warfarin--INR drops quickly     Patient is generally rarely in range. He fluctuates on his dose due to diet inconsistencies and alcohol.  He self- doses and adjusts based on what he has been

## 2023-04-30 DIAGNOSIS — N52.9 DIFFICULTY ATTAINING ERECTION: ICD-10-CM

## 2023-05-02 RX ORDER — TADALAFIL 5 MG/1
TABLET ORAL
Qty: 30 TABLET | Refills: 0 | Status: SHIPPED | OUTPATIENT
Start: 2023-05-02

## 2023-05-12 ENCOUNTER — ANTI-COAG VISIT (OUTPATIENT)
Dept: PHARMACY | Age: 53
End: 2023-05-12
Payer: COMMERCIAL

## 2023-05-12 DIAGNOSIS — Z79.01 LONG TERM (CURRENT) USE OF ANTICOAGULANTS: Primary | ICD-10-CM

## 2023-05-12 DIAGNOSIS — I82.91 CHRONIC EMBOLISM AND THROMBOSIS OF UNSPECIFIED VEIN: ICD-10-CM

## 2023-05-12 LAB — INTERNATIONAL NORMALIZATION RATIO, POC: 2.4

## 2023-05-12 PROCEDURE — 99211 OFF/OP EST MAY X REQ PHY/QHP: CPT

## 2023-05-12 PROCEDURE — 85610 PROTHROMBIN TIME: CPT

## 2023-05-12 NOTE — PROGRESS NOTES
Mr. Patricio Fuller is a 47 y.o. y/o male with history of pulmonary embolism with infarction. He has been taking warfarin since 2001   He presents today for anticoagulation monitoring and adjustment. Pertinent PMH: had a DVT and two pulmonary e/mbolism. (+) hepatitis B (2/2016)  Patient Reported Findings:  Yes     No  [x]   []       Patient verifies current dosing regimen as listed- confirms   []   [x]       S/S bleeding/bruising/swelling/SOB- denies   []   [x]       Blood in urine or stool- denies ---> bloody nose---> none    []   [x]       Procedures scheduled in the future at this time- possible colonoscopy, not yet scheduled, will call when colonoscopy scheduled---> Oct 6th, no holding instructions or clearance yet but states will not do lovenox so most likely just hold 5 days --> cancelled d/t car trouble---> plans to R/S --> colonoscopy, will be getting scheduled, will know date 6/13  [x]   []       Missed Dose  one time this week, on Tuesday --> missed dose on Sun --> denies --> missed last Friday and Saturday. ---> denies   []   [x]       Extra Dose -denies   []   [x]       Change in medications -denies, started centrum for men 9/19 (does contain vit K) -->  had tylenol the last few days---> no new changes  []   [x]       Change in health/diet/appetite-- reports he is back in the gym and  has been having green smoothies daily and sal/ad ~4x/week. (This has been his daily vit k intake for ~ 3 days now) plans to continue as such. --> has increased green intake to green smoothie twice a day---> Eating all plant based diet.  --> eating greens every day in juice --> having juice every morning and then serving of vit k every day as well --> had not been eating greens for 3 weeks --> back to gym, will be having greens daily now --> greens off and on currently, working ot be consistent --> has been back to normal with vit k for a few days then none for pat few days d/t feeling ill --> V8 instead of greens in drink,

## 2023-05-18 NOTE — TELEPHONE ENCOUNTER
Warfarin prescription phoned into [facility] to [address] under Ruth Ann Grier, APRN - CNP  Warfarin 7.5 mg tabs  Take 3.75 mg (7.5 mg x 0.5) every Sat; 7.5 mg (7.5 mg x 1) all other days  90 days   2 refills    Rosario Melo, PharmD, [address] Only    Intervention Detail: Refill(s) Provided  Total # of Interventions Recommended: 1  Total # of Interventions Accepted: 1  Time Spent (min): 15

## 2023-06-21 DIAGNOSIS — R73.09 ELEVATED GLUCOSE: Primary | ICD-10-CM

## 2023-06-21 DIAGNOSIS — N52.9 DIFFICULTY ATTAINING ERECTION: ICD-10-CM

## 2023-06-21 DIAGNOSIS — R97.20 ELEVATED PSA: Primary | ICD-10-CM

## 2023-06-23 ENCOUNTER — HOSPITAL ENCOUNTER (OUTPATIENT)
Age: 53
Discharge: HOME | End: 2023-06-23
Payer: COMMERCIAL

## 2023-06-23 DIAGNOSIS — R73.09 ELEVATED GLUCOSE: ICD-10-CM

## 2023-06-23 LAB
EST. AVERAGE GLUCOSE BLD GHB EST-MCNC: 102.5 MG/DL
HBA1C MFR BLD: 5.2 %

## 2023-06-23 PROCEDURE — 83036 HEMOGLOBIN GLYCOSYLATED A1C: CPT

## 2023-06-23 PROCEDURE — 36415 COLL VENOUS BLD VENIPUNCTURE: CPT

## 2023-06-30 ENCOUNTER — HOSPITAL ENCOUNTER (EMERGENCY)
Age: 53
Discharge: HOME | End: 2023-06-30
Payer: COMMERCIAL

## 2023-06-30 VITALS
HEART RATE: 58 BPM | TEMPERATURE: 98.3 F | HEIGHT: 65 IN | RESPIRATION RATE: 18 BRPM | DIASTOLIC BLOOD PRESSURE: 78 MMHG | SYSTOLIC BLOOD PRESSURE: 143 MMHG | WEIGHT: 173.1 LBS | OXYGEN SATURATION: 97 % | BODY MASS INDEX: 28.84 KG/M2

## 2023-06-30 DIAGNOSIS — S76.911A MUSCLE STRAIN OF RIGHT THIGH, INITIAL ENCOUNTER: Primary | ICD-10-CM

## 2023-06-30 LAB
ANION GAP SERPL CALCULATED.3IONS-SCNC: 12 MMOL/L (ref 3–16)
BASOPHILS # BLD: 0 K/UL (ref 0–0.2)
BASOPHILS NFR BLD: 0.8 %
BUN SERPL-MCNC: 15 MG/DL (ref 7–20)
CALCIUM SERPL-MCNC: 9.3 MG/DL (ref 8.3–10.6)
CHLORIDE SERPL-SCNC: 105 MMOL/L (ref 99–110)
CO2 SERPL-SCNC: 20 MMOL/L (ref 21–32)
CREAT SERPL-MCNC: 0.9 MG/DL (ref 0.9–1.3)
DEPRECATED RDW RBC AUTO: 13.5 % (ref 12.4–15.4)
EOSINOPHIL # BLD: 0 K/UL (ref 0–0.6)
EOSINOPHIL NFR BLD: 1 %
GFR SERPLBLD CREATININE-BSD FMLA CKD-EPI: >60 ML/MIN/{1.73_M2}
GLUCOSE SERPL-MCNC: 106 MG/DL (ref 70–99)
HCT VFR BLD AUTO: 45 % (ref 40.5–52.5)
HGB BLD-MCNC: 15.1 G/DL (ref 13.5–17.5)
INR PPP: 2.01 (ref 0.84–1.16)
LYMPHOCYTES # BLD: 1.7 K/UL (ref 1–5.1)
LYMPHOCYTES NFR BLD: 34.3 %
MCH RBC QN AUTO: 33.1 PG (ref 26–34)
MCHC RBC AUTO-ENTMCNC: 33.7 G/DL (ref 31–36)
MCV RBC AUTO: 98.4 FL (ref 80–100)
MONOCYTES # BLD: 0.4 K/UL (ref 0–1.3)
MONOCYTES NFR BLD: 8.7 %
NEUTROPHILS # BLD: 2.8 K/UL (ref 1.7–7.7)
NEUTROPHILS NFR BLD: 55.2 %
PLATELET # BLD AUTO: 164 K/UL (ref 135–450)
PMV BLD AUTO: 7 FL (ref 5–10.5)
POTASSIUM SERPL-SCNC: 4.3 MMOL/L (ref 3.5–5.1)
PROTHROMBIN TIME: 22.7 SEC (ref 11.5–14.8)
RBC # BLD AUTO: 4.57 M/UL (ref 4.2–5.9)
SODIUM SERPL-SCNC: 137 MMOL/L (ref 136–145)
WBC # BLD AUTO: 5.1 K/UL (ref 4–11)

## 2023-06-30 PROCEDURE — 85610 PROTHROMBIN TIME: CPT

## 2023-06-30 PROCEDURE — 80048 BASIC METABOLIC PNL TOTAL CA: CPT

## 2023-06-30 PROCEDURE — 85025 COMPLETE CBC W/AUTO DIFF WBC: CPT

## 2023-06-30 PROCEDURE — 99283 EMERGENCY DEPT VISIT LOW MDM: CPT

## 2023-06-30 PROCEDURE — 6370000000 HC RX 637 (ALT 250 FOR IP): Performed by: PHYSICIAN ASSISTANT

## 2023-06-30 RX ORDER — IBUPROFEN 400 MG/1
400 TABLET ORAL ONCE
Status: COMPLETED | OUTPATIENT
Start: 2023-06-30 | End: 2023-06-30

## 2023-06-30 RX ORDER — CYCLOBENZAPRINE HCL 5 MG
5 TABLET ORAL 2 TIMES DAILY PRN
Qty: 10 TABLET | Refills: 0 | Status: SHIPPED | OUTPATIENT
Start: 2023-06-30 | End: 2023-07-10

## 2023-06-30 RX ORDER — IBUPROFEN 600 MG/1
600 TABLET ORAL 3 TIMES DAILY PRN
Qty: 30 TABLET | Refills: 0 | Status: SHIPPED | OUTPATIENT
Start: 2023-06-30

## 2023-06-30 RX ADMIN — IBUPROFEN 400 MG: 400 TABLET, FILM COATED ORAL at 10:07

## 2023-06-30 ASSESSMENT — PAIN DESCRIPTION - ORIENTATION: ORIENTATION: RIGHT;UPPER

## 2023-06-30 ASSESSMENT — ENCOUNTER SYMPTOMS
COUGH: 0
NAUSEA: 0
VOMITING: 0
RHINORRHEA: 0
EYE PAIN: 0
CONSTIPATION: 0
ABDOMINAL PAIN: 0
BACK PAIN: 0
SHORTNESS OF BREATH: 0
DIARRHEA: 0
SORE THROAT: 0

## 2023-06-30 ASSESSMENT — PAIN DESCRIPTION - DESCRIPTORS: DESCRIPTORS: ACHING

## 2023-06-30 ASSESSMENT — PAIN - FUNCTIONAL ASSESSMENT: PAIN_FUNCTIONAL_ASSESSMENT: 0-10

## 2023-06-30 ASSESSMENT — LIFESTYLE VARIABLES
HOW OFTEN DO YOU HAVE A DRINK CONTAINING ALCOHOL: NEVER
HOW MANY STANDARD DRINKS CONTAINING ALCOHOL DO YOU HAVE ON A TYPICAL DAY: PATIENT DOES NOT DRINK

## 2023-06-30 ASSESSMENT — PAIN DESCRIPTION - LOCATION: LOCATION: LEG

## 2023-06-30 ASSESSMENT — PAIN SCALES - GENERAL
PAINLEVEL_OUTOF10: 2
PAINLEVEL_OUTOF10: 2

## 2023-07-06 LAB — PSA, TOTAL: 3.92 NG/ML (ref 0–4)

## 2023-07-20 ENCOUNTER — TELEPHONE (OUTPATIENT)
Dept: PHARMACY | Age: 53
End: 2023-07-20

## 2023-07-26 DIAGNOSIS — N52.9 DIFFICULTY ATTAINING ERECTION: ICD-10-CM

## 2023-07-26 RX ORDER — TADALAFIL 5 MG/1
TABLET ORAL
Qty: 30 TABLET | Refills: 0 | Status: SHIPPED | OUTPATIENT
Start: 2023-07-26

## 2023-08-03 ENCOUNTER — ANTI-COAG VISIT (OUTPATIENT)
Dept: PHARMACY | Age: 53
End: 2023-08-03
Payer: COMMERCIAL

## 2023-08-03 DIAGNOSIS — I82.91 CHRONIC EMBOLISM AND THROMBOSIS OF UNSPECIFIED VEIN: ICD-10-CM

## 2023-08-03 DIAGNOSIS — Z79.01 LONG TERM (CURRENT) USE OF ANTICOAGULANTS: Primary | ICD-10-CM

## 2023-08-03 LAB — INTERNATIONAL NORMALIZATION RATIO, POC: 1.7

## 2023-08-03 PROCEDURE — 99212 OFFICE O/P EST SF 10 MIN: CPT

## 2023-08-03 PROCEDURE — 85610 PROTHROMBIN TIME: CPT

## 2023-08-03 NOTE — PROGRESS NOTES
Mr. Nhi Dorado is a 47 y.o. y/o male with history of pulmonary embolism with infarction. He has been taking warfarin since 2001   He presents today for anticoagulation monitoring and adjustment. Pertinent PMH: had a DVT and two pulmonary e/mbolism. (+) hepatitis B (2/2016)  Patient Reported Findings:  Yes     No  [x]   []       Patient verifies current dosing regimen as listed- confirms   []   [x]       S/S bleeding/bruising/swelling/SOB- denies   []   [x]       Blood in urine or stool- denies ---> bloody nose---> none    []   [x]       Procedures scheduled in the future at this time- possible colonoscopy, not yet scheduled, will call when colonoscopy scheduled---> Oct 6th, no holding instructions or clearance yet but states will not do lovenox so most likely just hold 5 days --> cancelled d/t car trouble---> plans to R/S --> colonoscopy scheduled 9/5, needs clearance from PCP office   [x]   []       Missed Dose  yesterday   []   [x]       Extra Dose -denies   []   [x]       Change in medications -denies, started centrum for men 9/19 (does contain vit K) -->  had tylenol the last few days---> no new changes  []   [x]       Change in health/diet/appetite-- reports he is back in the gym and  has been having green smoothies daily and sal/ad ~4x/week. (This has been his daily vit k intake for ~ 3 days now) plans to continue as such. --> has increased green intake to green smoothie twice a day---> Eating all plant based diet.  --> eating greens every day in juice --> having juice every morning and then serving of vit k every day as well --> had not been eating greens for 3 weeks --> back to gym, will be having greens daily now --> greens off and on currently, working ot be consistent --> has been back to normal with vit k for a few days then none for pat few days d/t feeling ill --> V8 instead of greens in drink, back to feeling better  --> no vit k since end of last month--->V8 in protein drink --> having 4 cups of

## 2023-08-16 ENCOUNTER — TELEPHONE (OUTPATIENT)
Dept: PHARMACY | Age: 53
End: 2023-08-16

## 2023-08-16 NOTE — TELEPHONE ENCOUNTER
Pt LVM asking to rs his appt this week to same day and time next week. R/s on 8/24 pt will need to confirm.

## 2023-08-22 ENCOUNTER — TELEPHONE (OUTPATIENT)
Dept: INTERNAL MEDICINE CLINIC | Age: 53
End: 2023-08-22

## 2023-08-23 ENCOUNTER — TELEPHONE (OUTPATIENT)
Dept: PHARMACY | Age: 53
End: 2023-08-23

## 2023-08-24 NOTE — TELEPHONE ENCOUNTER
Patient returned my call. He states he has not been given all the information on the colonoscopy. I explained that usually patient's hold for 5 days prior to this procedure and we can dose him for restarting the warfarin after the procedure as well as any lovenox bridging his doctor may prescribe. Patient state he cannot give himself injections and I encouraged him to discuss this with his physician. He is scheduled to see PCP on 9/1 and I asked patient to contact clinic after this appt to clarify PCP's instructions. Rescheduled patient for 9/11 to RTC to clinic for management.

## 2023-09-01 ENCOUNTER — OFFICE VISIT (OUTPATIENT)
Dept: INTERNAL MEDICINE CLINIC | Age: 53
End: 2023-09-01

## 2023-09-01 VITALS
BODY MASS INDEX: 28.52 KG/M2 | HEART RATE: 57 BPM | SYSTOLIC BLOOD PRESSURE: 138 MMHG | DIASTOLIC BLOOD PRESSURE: 84 MMHG | WEIGHT: 171.2 LBS | HEIGHT: 65 IN | OXYGEN SATURATION: 98 %

## 2023-09-01 DIAGNOSIS — Z01.818 PRE-OP EXAM: Primary | ICD-10-CM

## 2023-09-01 ASSESSMENT — ENCOUNTER SYMPTOMS
NAUSEA: 0
VOMITING: 0
COUGH: 0
WHEEZING: 0
SHORTNESS OF BREATH: 0
DIARRHEA: 0
ABDOMINAL PAIN: 0
CONSTIPATION: 0

## 2023-09-01 NOTE — PROGRESS NOTES
Preoperative Consultation  Naomi Cruz  YOB: 1970    Date of Service:  9/1/2023  Chief Complaint   Patient presents with    Pre-op Exam     This patient presents today at the request of the surgeon for a preoperative consultation. Having a screening colonoscopy and needs cardiac clearance. Pt states every time he has surgery he holds coumadin for 5 days prior- held this starting yesterday. He is not interested in lovenox and states he will resume coumadin after surgery. Surgeon: Dr. Valente Pereira  Indication for surgery: colonoscopy  Date of surgery: 9/5/23  Location of surgery: [facility]  Indicated/required preoperative testing: cardiac clearance  Smoker: denies  Previous anesthesia complications: denies  Family history of anesthesia complications: denies  Loose, capped or false teeth: denies  Recent chest pain or SOB: No  Known Bleeding Risk: No recent or remote history of abnormal bleeding  Personal or FH of DVT/PE: yes-personal history- on coumadin    Allergies   Allergen Reactions    Morphine Nausea And Vomiting     Current Outpatient Medications   Medication Sig Dispense Refill    tadalafil (CIALIS) 5 MG tablet TAKE 1 TABLET BY MOUTH AT LEAST 30 MINUTES PRIOR TO ANTICIPATED SEXUAL ACTIVITY AS ONE SINGLE DOSE AND NOT MORE THAN ONCE DAILY 30 tablet 0    warfarin (COUMADIN) 7.5 MG tablet Take 1 tablet by mouth See Admin Instructions 7.5 mg 6 days/week and 3.75 mg on Saturdays      ibuprofen (ADVIL;MOTRIN) 600 MG tablet Take 1 tablet by mouth 3 times daily as needed for Pain 30 tablet 0    diclofenac sodium (VOLTAREN) 1 % GEL Apply 4 g topically 4 times daily as needed for Pain 100 g 0     No current facility-administered medications for this visit.      Patient Active Problem List   Diagnosis    Other pulmonary embolism and infarction    GI bleed    Acute hepatitis B    Hematemesis    Chronic embolism and thrombosis of unspecified vein    Long term (current) use of anticoagulants     Past Medical

## 2023-09-11 ENCOUNTER — TELEPHONE (OUTPATIENT)
Dept: INTERNAL MEDICINE CLINIC | Age: 53
End: 2023-09-11

## 2023-09-11 ENCOUNTER — ANTI-COAG VISIT (OUTPATIENT)
Dept: PHARMACY | Age: 53
End: 2023-09-11
Payer: COMMERCIAL

## 2023-09-11 DIAGNOSIS — I82.91 CHRONIC EMBOLISM AND THROMBOSIS OF UNSPECIFIED VEIN: ICD-10-CM

## 2023-09-11 DIAGNOSIS — Z79.01 LONG TERM (CURRENT) USE OF ANTICOAGULANTS: Primary | ICD-10-CM

## 2023-09-11 LAB — INTERNATIONAL NORMALIZATION RATIO, POC: 2.2

## 2023-09-11 PROCEDURE — 99212 OFFICE O/P EST SF 10 MIN: CPT

## 2023-09-11 PROCEDURE — 85610 PROTHROMBIN TIME: CPT

## 2023-09-11 NOTE — PROGRESS NOTES
Mr. Ivan Dixon is a 47 y.o. y/o male with history of pulmonary embolism with infarction. He has been taking warfarin since 2001   He presents today for anticoagulation monitoring and adjustment. Pertinent PMH: had a DVT and two pulmonary e/mbolism. (+) hepatitis B (2/2016)  Patient Reported Findings:  Yes     No  [x]   []       Patient verifies current dosing regimen as listed- confirms   []   [x]       S/S bleeding/bruising/swelling/SOB- denies   []   [x]       Blood in urine or stool- denies ---> bloody nose---> none    [x]   []       Procedures scheduled in the future at this time- possible colonoscopy, not yet scheduled, will call when colonoscopy scheduled---> Oct 6th, no holding instructions or clearance yet but states will not do lovenox so most likely just hold 5 days --> cancelled d/t car trouble---> plans to R/S --> colonoscopy scheduled 9/5, needs clearance from PCP office --> held warfarin 5 days prior and never resumed warfarin post op   [x]   []       Missed Dose  has not taken warfarin in 10 days    []   [x]       Extra Dose -denies   []   [x]       Change in medications -denies, started centrum for men 9/19 (does contain vit K) -->  had tylenol the last few days---> no new changes  [x]   []       Change in health/diet/appetite-- reports he is back in the gym and  has been having green smoothies daily and sal/ad ~4x/week. (This has been his daily vit k intake for ~ 3 days now) plans to continue as such. --> has increased green intake to green smoothie twice a day---> Eating all plant based diet.  --> eating greens every day in juice --> having juice every morning and then serving of vit k every day as well --> had not been eating greens for 3 weeks --> back to gym, will be having greens daily now --> greens off and on currently, working ot be consistent --> has been back to normal with vit k for a few days then none for pat few days d/t feeling ill --> V8 instead of greens in drink, back to feeling

## 2023-09-11 NOTE — TELEPHONE ENCOUNTER
[facility] calling---received EKG but need cardiac clearance ---they are sending over forms---Melanie can be reached at [phone] ext [phone]. Thanks.

## 2023-09-18 ENCOUNTER — APPOINTMENT (OUTPATIENT)
Dept: PHARMACY | Age: 53
End: 2023-09-18
Payer: COMMERCIAL

## 2023-09-18 ENCOUNTER — TELEPHONE (OUTPATIENT)
Dept: PHARMACY | Age: 53
End: 2023-09-18

## 2023-10-02 ENCOUNTER — ANTI-COAG VISIT (OUTPATIENT)
Dept: PHARMACY | Age: 53
End: 2023-10-02
Payer: COMMERCIAL

## 2023-10-02 DIAGNOSIS — Z79.01 LONG TERM (CURRENT) USE OF ANTICOAGULANTS: Primary | ICD-10-CM

## 2023-10-02 DIAGNOSIS — I82.91 CHRONIC EMBOLISM AND THROMBOSIS OF UNSPECIFIED VEIN: ICD-10-CM

## 2023-10-02 LAB — INTERNATIONAL NORMALIZATION RATIO, POC: 2.4

## 2023-10-02 PROCEDURE — 99211 OFF/OP EST MAY X REQ PHY/QHP: CPT

## 2023-10-02 PROCEDURE — 85610 PROTHROMBIN TIME: CPT

## 2023-10-02 NOTE — PROGRESS NOTES
Mr. Kyra Richard is a 47 y.o. y/o male with history of pulmonary embolism with infarction. He has been taking warfarin since 2001   He presents today for anticoagulation monitoring and adjustment. Pertinent PMH: had a DVT and two pulmonary e/mbolism. (+) hepatitis B (2/2016)  Patient Reported Findings:  Yes     No  [x]   []       Patient verifies current dosing regimen as listed- confirms---> has been doing half on Sat only    []   [x]       S/S bleeding/bruising/swelling/SOB- denies   []   [x]       Blood in urine or stool- denies ---> bloody nose---> none    [x]   []       Procedures scheduled in the future at this time- possible colonoscopy, not yet scheduled, will call when colonoscopy scheduled---> Oct 6th, no holding instructions or clearance yet but states will not do lovenox so most likely just hold 5 days --> cancelled d/t car trouble---> plans to R/S --> colonoscopy scheduled 9/5, needs clearance from PCP office --> held warfarin 5 days prior and never resumed warfarin post op   [x]   []       Missed Dose  has not taken warfarin in 10 days ---> denies    []   [x]       Extra Dose -denies   []   [x]       Change in medications -denies, started centrum for men 9/19 (does contain vit K) -->  had tylenol the last few days---> no new changes  [x]   []       Change in health/diet/appetite-- reports he is back in the gym and  has been having green smoothies daily and sal/ad ~4x/week. (This has been his daily vit k intake for ~ 3 days now) plans to continue as such. --> has increased green intake to green smoothie twice a day---> Eating all plant based diet.  --> eating greens every day in juice --> having juice every morning and then serving of vit k every day as well --> had not been eating greens for 3 weeks --> back to gym, will be having greens daily now --> greens off and on currently, working ot be consistent --> has been back to normal with vit k for a few days then none for pat few days d/t feeling ill

## 2023-10-20 NOTE — TELEPHONE ENCOUNTER
Patient called and rescheduled his anticoag appt for 7/19. Opzelura Counseling:  I discussed with the patient the risks of Opzelura including but not limited to nasopharngitis, bronchitis, ear infection, eosinophila, hives, diarrhea, folliculitis, tonsillitis, and rhinorrhea.  Taken orally, this medication has been linked to serious infections; higher rate of mortality; malignancy and lymphoproliferative disorders; major adverse cardiovascular events; thrombosis; thrombocytopenia, anemia, and neutropenia; and lipid elevations.

## 2023-10-23 ENCOUNTER — TELEPHONE (OUTPATIENT)
Dept: PHARMACY | Age: 53
End: 2023-10-23

## 2023-10-23 NOTE — TELEPHONE ENCOUNTER
Pt lvm asking to cancel CC appt today, would like to rs on 11/2 or 11/7. R/s on 11/2 @ 815 lvm asking pt to confirm appt.

## 2023-10-28 DIAGNOSIS — N52.9 DIFFICULTY ATTAINING ERECTION: ICD-10-CM

## 2023-10-30 RX ORDER — TADALAFIL 5 MG/1
TABLET ORAL
Qty: 30 TABLET | Refills: 0 | Status: SHIPPED | OUTPATIENT
Start: 2023-10-30

## 2023-11-02 ENCOUNTER — ANTI-COAG VISIT (OUTPATIENT)
Dept: PHARMACY | Age: 53
End: 2023-11-02
Payer: COMMERCIAL

## 2023-11-02 DIAGNOSIS — I82.91 CHRONIC EMBOLISM AND THROMBOSIS OF UNSPECIFIED VEIN: ICD-10-CM

## 2023-11-02 DIAGNOSIS — Z79.01 LONG TERM (CURRENT) USE OF ANTICOAGULANTS: Primary | ICD-10-CM

## 2023-11-02 LAB — INTERNATIONAL NORMALIZATION RATIO, POC: 3.4

## 2023-11-02 PROCEDURE — 99211 OFF/OP EST MAY X REQ PHY/QHP: CPT

## 2023-11-02 PROCEDURE — 85610 PROTHROMBIN TIME: CPT

## 2023-11-02 NOTE — PROGRESS NOTES
Major bleeding event  []   [x]       Thromboembolic event  Takes warfarin in AM  Duration of warfarin Therapy: indefinitely    INR Range:  2.0-3.0  Cautious when holding warfarin--INR drops quickly     Patient is generally rarely in range. He fluctuates on his dose due to diet inconsistencies and alcohol. He self- doses and adjusts based on what he has been drinking. Non-compliant with apts. Was due back ---> 10/23    INR is 3.4 today after staying on 3.75mg once weekly and not twice as before. States has not had greens at all, wants to return to them/shakes. Wants to keep dose the same but increase greens. If elevated at next visit, return to 3.75mg Tues/Sat. Hold today then take 3.75 mg on Sat and 7.5 mg all other days of the week. Encouraged to maintain a consistency of vegetables/salads.   Recheck INR in 2 weeks,     Does not need pw printed     * Consent form signed on 2023*    Referring is Dr. Nir Crowley - REF updated 4/15/21  INR (no units)   Date Value   2023 2.01 (H)   04/15/2021 4.43 (H)   2018 2.1   2018 1.9     INR,(POC) (no units)   Date Value   10/02/2023 2.4   2023 2.2   2023 1.7   2023 2.3     For Pharmacy Admin Tracking Only    Intervention Detail: Adherence Monitorin and Dose Adjustment: 1, reason: Therapy Optimization  Total # of Interventions Recommended: 2  Total # of Interventions Accepted: 2  Time Spent (min): 15

## 2023-11-15 ENCOUNTER — TELEPHONE (OUTPATIENT)
Dept: PHARMACY | Age: 53
End: 2023-11-15

## 2023-11-15 NOTE — TELEPHONE ENCOUNTER
LVM requesting a reschedule. Rescheduled patient for 11/22 @ 9:15a. Will LVM for patient with this information.

## 2023-11-15 NOTE — TELEPHONE ENCOUNTER
Unable to reach patient about reschedule. (11/22 @ 9:15a)  Voice mail box is not set up and I could not leave a message.

## 2023-11-30 ENCOUNTER — ANTI-COAG VISIT (OUTPATIENT)
Dept: PHARMACY | Age: 53
End: 2023-11-30
Payer: COMMERCIAL

## 2023-11-30 DIAGNOSIS — I82.91 CHRONIC EMBOLISM AND THROMBOSIS OF UNSPECIFIED VEIN: ICD-10-CM

## 2023-11-30 DIAGNOSIS — Z79.01 LONG TERM (CURRENT) USE OF ANTICOAGULANTS: Primary | ICD-10-CM

## 2023-11-30 LAB — INTERNATIONAL NORMALIZATION RATIO, POC: 2.3

## 2023-11-30 PROCEDURE — 99211 OFF/OP EST MAY X REQ PHY/QHP: CPT

## 2023-11-30 PROCEDURE — 85610 PROTHROMBIN TIME: CPT

## 2023-11-30 NOTE — PROGRESS NOTES
discussed DOACs in comparison to warfarin --> discussed there is no non-pharmacologic way to thin blood      Clinical Outcomes:  Yes     No   []   [x]       Major bleeding event  []   [x]       Thromboembolic event  Takes warfarin in AM  Duration of warfarin Therapy: indefinitely    INR Range:  2.0-3.0  Cautious when holding warfarin--INR drops quickly     Patient is generally rarely in range. He fluctuates on his dose due to diet inconsistencies and alcohol. He self- doses and adjusts based on what he has been drinking. Non-compliant with apts. Was due back 9/18---> 10/23    INR is 2.3 today after more alcohol last week but missed dose   Continue 3.75 mg on Sat and 7.5 mg all other days of the week. Encouraged to maintain a consistency of vegetables/salads.   Recheck INR in 3 weeks, 12/21    Does not need pw printed     * Consent form signed on 1/6/2023*    Referring is Dr. Alivia Burns - REF updated 4/15/21  INR (no units)   Date Value   06/30/2023 2.01 (H)   04/15/2021 4.43 (H)   09/20/2018 2.1   08/17/2018 1.9     INR,(POC) (no units)   Date Value   11/02/2023 3.4   10/02/2023 2.4   09/11/2023 2.2   08/03/2023 1.7     For Pharmacy Admin Tracking Only    Total # of Interventions Recommended: 0  Total # of Interventions Accepted: 0  Time Spent (min): 15

## 2024-01-04 ENCOUNTER — ANTI-COAG VISIT (OUTPATIENT)
Dept: PHARMACY | Age: 54
End: 2024-01-04
Payer: COMMERCIAL

## 2024-01-04 DIAGNOSIS — I82.91 CHRONIC EMBOLISM AND THROMBOSIS OF UNSPECIFIED VEIN: ICD-10-CM

## 2024-01-04 DIAGNOSIS — Z79.01 LONG TERM (CURRENT) USE OF ANTICOAGULANTS: Primary | ICD-10-CM

## 2024-01-04 LAB — INTERNATIONAL NORMALIZATION RATIO, POC: 2.6

## 2024-01-04 PROCEDURE — 99211 OFF/OP EST MAY X REQ PHY/QHP: CPT

## 2024-01-04 PROCEDURE — 85610 PROTHROMBIN TIME: CPT

## 2024-01-04 NOTE — PROGRESS NOTES
flexeril   [x]   []       Other complaints discussed DOACs in comparison to warfarin --> discussed there is no non-pharmacologic way to thin blood      Clinical Outcomes:  Yes     No   []   [x]       Major bleeding event  []   [x]       Thromboembolic event  Takes warfarin in AM  Duration of warfarin Therapy: indefinitely    INR Range:  2.0-3.0  Cautious when holding warfarin--INR drops quickly     Patient is generally rarely in range. He fluctuates on his dose due to diet inconsistencies and alcohol. He self- doses and adjusts based on what he has been drinking. Non-compliant with apts.    Was due back ---> 10/23--->     INR is 2.6 today   Continue 3.75 mg on Sat and 7.5 mg all other days of the week.  Encouraged to maintain a consistency of vegetables/salads.  Recheck INR in 4 weeks,   Does not need pw printed   Consent form signed on 24    Referring is Dr. Windy Livingston - REF updated 4/15/21  INR (no units)   Date Value   2023 2.01 (H)   04/15/2021 4.43 (H)   2018 2.1   2018 1.9     INR,(POC) (no units)   Date Value   2023 2.3   2023 3.4   10/02/2023 2.4   2023 2.2     For Pharmacy Admin Tracking Only    Intervention Detail: Adherence Monitorin  Total # of Interventions Recommended: 1  Total # of Interventions Accepted: 1  Time Spent (min): 15

## 2024-01-24 DIAGNOSIS — N52.9 DIFFICULTY ATTAINING ERECTION: ICD-10-CM

## 2024-01-24 RX ORDER — TADALAFIL 5 MG/1
TABLET ORAL
Qty: 30 TABLET | Refills: 0 | Status: SHIPPED | OUTPATIENT
Start: 2024-01-24

## 2024-02-01 ENCOUNTER — ANTI-COAG VISIT (OUTPATIENT)
Dept: PHARMACY | Age: 54
End: 2024-02-01
Payer: COMMERCIAL

## 2024-02-01 DIAGNOSIS — I82.91 CHRONIC EMBOLISM AND THROMBOSIS OF UNSPECIFIED VEIN: ICD-10-CM

## 2024-02-01 DIAGNOSIS — Z79.01 LONG TERM (CURRENT) USE OF ANTICOAGULANTS: Primary | ICD-10-CM

## 2024-02-01 LAB — INTERNATIONAL NORMALIZATION RATIO, POC: 3.4

## 2024-02-01 PROCEDURE — 99211 OFF/OP EST MAY X REQ PHY/QHP: CPT

## 2024-02-01 PROCEDURE — 85610 PROTHROMBIN TIME: CPT

## 2024-02-01 NOTE — PROGRESS NOTES
--> greens off and on currently, working ot be consistent --> has been back to normal with vit k for a few days then none for pat few days d/t feeling ill --> V8 instead of greens in drink, back to feeling better  --> no vit k since end of last month--->V8 in protein drink --> having 4 cups of broccoli/day for week. Has not had any protein drinks since last week but is planning to resume. ---> V8 daily but no greens --> no vit k recently--> will be trying to do a healthy eating challenge and eating more greens including kale/spinach---> did not start new diet --> less vit k --> no vit k this week --> some more greens --> Starting to fast, says consistent with the greens (same amount) - about 4 salads a week. Is doing intermittent fasting - eat around 6pm. ---> no changes --> has been losing weight, 30 lbs.---> no greens or shakes, will return to them --> has not been going to the gym and diet has been off ---> diet off---> diet back, kale soup    [x]   []       Change in alcohol use- Pt holds 1/2 dose of warfarin on nights he drinks. Aware of the impact of alcohol. States he has not had alcohol in about a month. ---> some recently but nothing over weekend --> states that he had large amount of alcohol over weekend, 3-4 shots --> alcohol 4-5 days ago --> inc EtOH d/t camping. Drink 2 days ago. --> none --> had partied over the weekend but was driving so didn't drink as much --> drink on Friday --> had some alcohol last night--> recent camping with heavy drinking but has not drank since last weekend---> drinking heavily recently  ---> had some alcohol recently --> less alcohol --> Did have more intake Fri, Sat (heavy use) ---> none recently---> heavy drinking Mem Weekend --> had significant liqour over the weekend--> had some alcohol on 11/22 ---> none   []   [x]       Change in activity  --> back to gym now   []   [x]       Hospital admission  [x]   []       Emergency department visit in ED 6/30 for mucle strain of

## 2024-02-14 ENCOUNTER — TELEPHONE (OUTPATIENT)
Dept: PHARMACY | Age: 54
End: 2024-02-14

## 2024-03-07 ENCOUNTER — ANTI-COAG VISIT (OUTPATIENT)
Dept: PHARMACY | Age: 54
End: 2024-03-07
Payer: COMMERCIAL

## 2024-03-07 DIAGNOSIS — Z79.01 LONG TERM (CURRENT) USE OF ANTICOAGULANTS: Primary | ICD-10-CM

## 2024-03-07 DIAGNOSIS — I82.91 CHRONIC EMBOLISM AND THROMBOSIS OF UNSPECIFIED VEIN: ICD-10-CM

## 2024-03-07 LAB — INTERNATIONAL NORMALIZATION RATIO, POC: 3.6

## 2024-03-07 PROCEDURE — 99212 OFFICE O/P EST SF 10 MIN: CPT

## 2024-03-07 PROCEDURE — 85610 PROTHROMBIN TIME: CPT

## 2024-03-07 NOTE — PROGRESS NOTES
Mr. Stephen Kitchen is a 54 y.o. y/o male with history of pulmonary embolism with infarction. He has been taking warfarin since 2001   He presents today for anticoagulation monitoring and adjustment.  Pertinent PMH: had a DVT and two pulmonary e/mbolism. (+) hepatitis B (2/2016)  Patient Reported Findings:  Yes     No  [x]   []       Patient verifies current dosing regimen as listed- confirms---> has been doing half on Sat only ---> confirmed    []   [x]       S/S bleeding/bruising/swelling/SOB- denies   []   [x]       Blood in urine or stool- denies ---> bloody nose---> none    []   [x]       Procedures scheduled in the future at this time- possible colonoscopy, not yet scheduled, will call when colonoscopy scheduled---> Oct 6th, no holding instructions or clearance yet but states will not do lovenox so most likely just hold 5 days --> cancelled d/t car trouble---> plans to R/S --> colonoscopy scheduled 9/5, needs clearance from PCP office --> held warfarin 5 days prior and never resumed warfarin post op --> none upcoming   [x]   []       Missed Dose  has not taken warfarin in 10 days ---> denies --> 11/22 d/t alcohol intkae --> denies---> did miss several weeks ago ---> Sunday   []   [x]       Extra Dose -denies   [x]   []       Change in medications -denies, started centrum for men 9/19 (does contain vit K) -->  had tylenol the last few days---> no new changes---> will call with change--> folinic plus---> denies   []   [x]       Change in health/diet/appetite-- reports he is back in the gym and  has been having green smoothies daily and sal/ad ~4x/week. (This has been his daily vit k intake for ~ 3 days now) plans to continue as such. --> has increased green intake to green smoothie twice a day---> Eating all plant based diet. --> eating greens every day in juice --> having juice every morning and then serving of vit k every day as well --> had not been eating greens for 3 weeks --> back to gym, will be having

## 2024-03-28 ENCOUNTER — ANTI-COAG VISIT (OUTPATIENT)
Dept: PHARMACY | Age: 54
End: 2024-03-28
Payer: COMMERCIAL

## 2024-03-28 DIAGNOSIS — Z79.01 LONG TERM (CURRENT) USE OF ANTICOAGULANTS: Primary | ICD-10-CM

## 2024-03-28 DIAGNOSIS — I82.91 CHRONIC EMBOLISM AND THROMBOSIS OF UNSPECIFIED VEIN: ICD-10-CM

## 2024-03-28 LAB — INTERNATIONAL NORMALIZATION RATIO, POC: 3.4

## 2024-03-28 PROCEDURE — 99211 OFF/OP EST MAY X REQ PHY/QHP: CPT

## 2024-03-28 PROCEDURE — 85610 PROTHROMBIN TIME: CPT

## 2024-03-28 NOTE — PROGRESS NOTES
Mr. Stephen Kitchen is a 54 y.o. y/o male with history of pulmonary embolism with infarction. He has been taking warfarin since 2001   He presents today for anticoagulation monitoring and adjustment.  Pertinent PMH: had a DVT and two pulmonary e/mbolism. (+) hepatitis B (2/2016)  Patient Reported Findings:  Yes     No  [x]   []       Patient verifies current dosing regimen as listed- confirms---> has been doing half on Sat only ---> confirmed    []   [x]       S/S bleeding/bruising/swelling/SOB- denies   []   [x]       Blood in urine or stool- denies ---> bloody nose---> none    []   [x]       Procedures scheduled in the future at this time- possible colonoscopy, not yet scheduled, will call when colonoscopy scheduled---> Oct 6th, no holding instructions or clearance yet but states will not do lovenox so most likely just hold 5 days --> cancelled d/t car trouble---> plans to R/S --> colonoscopy scheduled 9/5, needs clearance from PCP office --> held warfarin 5 days prior and never resumed warfarin post op --> none upcoming   [x]   []       Missed Dose  has not taken warfarin in 10 days ---> denies --> 11/22 d/t alcohol intkae --> denies---> did miss several weeks ago ---> Sunday---> denies    []   [x]       Extra Dose -denies   [x]   []       Change in medications -denies, started centrum for men 9/19 (does contain vit K) -->  had tylenol the last few days---> no new changes---> will call with change--> folinic plus---> denies   []   [x]       Change in health/diet/appetite-- reports he is back in the gym and  has been having green smoothies daily and sal/ad ~4x/week. (This has been his daily vit k intake for ~ 3 days now) plans to continue as such. --> has increased green intake to green smoothie twice a day---> Eating all plant based diet. --> eating greens every day in juice --> having juice every morning and then serving of vit k every day as well --> had not been eating greens for 3 weeks --> back to gym, will be

## 2024-04-11 ENCOUNTER — ANTI-COAG VISIT (OUTPATIENT)
Dept: PHARMACY | Age: 54
End: 2024-04-11
Payer: COMMERCIAL

## 2024-04-11 DIAGNOSIS — I82.91 CHRONIC EMBOLISM AND THROMBOSIS OF UNSPECIFIED VEIN: ICD-10-CM

## 2024-04-11 DIAGNOSIS — Z79.01 LONG TERM (CURRENT) USE OF ANTICOAGULANTS: Primary | ICD-10-CM

## 2024-04-11 LAB — INTERNATIONAL NORMALIZATION RATIO, POC: 4.2

## 2024-04-11 PROCEDURE — 99212 OFFICE O/P EST SF 10 MIN: CPT

## 2024-04-11 PROCEDURE — 85610 PROTHROMBIN TIME: CPT

## 2024-04-25 ENCOUNTER — ANTI-COAG VISIT (OUTPATIENT)
Dept: PHARMACY | Age: 54
End: 2024-04-25
Payer: COMMERCIAL

## 2024-04-25 DIAGNOSIS — I82.91 CHRONIC EMBOLISM AND THROMBOSIS OF UNSPECIFIED VEIN: ICD-10-CM

## 2024-04-25 DIAGNOSIS — Z79.01 LONG TERM (CURRENT) USE OF ANTICOAGULANTS: Primary | ICD-10-CM

## 2024-04-25 LAB — INTERNATIONAL NORMALIZATION RATIO, POC: 1.6

## 2024-04-25 PROCEDURE — 99212 OFFICE O/P EST SF 10 MIN: CPT

## 2024-04-25 PROCEDURE — 85610 PROTHROMBIN TIME: CPT

## 2024-04-25 NOTE — PROGRESS NOTES
recently---> heavy drinking Mem Weekend --> had significant liqour over the weekend--> had some alcohol on 11/22 ---> none   []   [x]       Change in activity  --> back to gym now   []   [x]       Hospital admission  [x]   []       Emergency department visit in ED 6/30 for mucle strain of thigh. INR 2. was given ibuprofen, voltaren gel, flexeril   [x]   []       Other complaints discussed DOACs in comparison to warfarin --> discussed there is no non-pharmacologic way to thin blood      Clinical Outcomes:  Yes     No   []   [x]       Major bleeding event  []   [x]       Thromboembolic event  Takes warfarin in AM  Duration of warfarin Therapy: indefinitely    INR Range:  2.0-3.0  Cautious when holding warfarin--INR drops quickly     Patient is generally rarely in range. He fluctuates on his dose due to diet inconsistencies and alcohol. He self- doses and adjusts based on what he has been drinking. Non-compliant with apts.    Was due back 9/18---> 10/23---> 12/21---> 2/15---> 3/21    INR is 1.6 today after missing doses over weekend  Not able to assess dose adjustment from last visit d/t missed doses over the weekend    Take 7.5 mg tonight then continue dose of 3.75 mg on Tues, Thurs and Sat and 7.5 mg all other days of the week in order to assess weekly dose adjustment from last visit   Encouraged to maintain a consistency of vegetables/salads.  Recheck INR in 2 weeks, 5/9  Does not need pw printed or apt card, has MyChart     Consent form signed on 1/4/24    Referring is Dr. Windy Livingston - REF updated 4/15/21  INR (no units)   Date Value   06/30/2023 2.01 (H)   04/15/2021 4.43 (H)   09/20/2018 2.1   08/17/2018 1.9     INR,(POC) (no units)   Date Value   04/11/2024 4.2   03/28/2024 3.4   03/07/2024 3.6   02/01/2024 3.4     For Pharmacy Admin Tracking Only    Intervention Detail: Dose Adjustment: 1, reason: Therapy Optimization  Total # of Interventions Recommended: 1  Total # of Interventions Accepted: 1  Time

## 2024-05-07 DIAGNOSIS — N52.9 DIFFICULTY ATTAINING ERECTION: ICD-10-CM

## 2024-05-07 RX ORDER — TADALAFIL 5 MG/1
TABLET ORAL
Qty: 30 TABLET | Refills: 0 | OUTPATIENT
Start: 2024-05-07

## 2024-05-08 ENCOUNTER — TELEPHONE (OUTPATIENT)
Dept: PHARMACY | Age: 54
End: 2024-05-08

## 2024-05-08 NOTE — TELEPHONE ENCOUNTER
Pt called to RS to next week.    Karin Wharton, PharmD, [facility]  For Pharmacy Admin Tracking Only    Intervention Detail: Adherence Monitorin  Total # of Interventions Recommended: 1  Total # of Interventions Accepted: 1  Time Spent (min): 5

## 2024-05-09 ENCOUNTER — APPOINTMENT (OUTPATIENT)
Dept: PHARMACY | Age: 54
End: 2024-05-09
Payer: COMMERCIAL

## 2024-05-13 DIAGNOSIS — N52.9 DIFFICULTY ATTAINING ERECTION: ICD-10-CM

## 2024-05-14 RX ORDER — TADALAFIL 5 MG/1
TABLET ORAL
Qty: 30 TABLET | Refills: 0 | Status: SHIPPED | OUTPATIENT
Start: 2024-05-14

## 2024-05-23 ENCOUNTER — APPOINTMENT (OUTPATIENT)
Dept: PHARMACY | Age: 54
End: 2024-05-23
Payer: COMMERCIAL

## 2024-05-30 ENCOUNTER — ANTI-COAG VISIT (OUTPATIENT)
Dept: PHARMACY | Age: 54
End: 2024-05-30
Payer: COMMERCIAL

## 2024-05-30 DIAGNOSIS — I82.91 CHRONIC EMBOLISM AND THROMBOSIS OF UNSPECIFIED VEIN: ICD-10-CM

## 2024-05-30 DIAGNOSIS — Z79.01 LONG TERM (CURRENT) USE OF ANTICOAGULANTS: Primary | ICD-10-CM

## 2024-05-30 LAB — INTERNATIONAL NORMALIZATION RATIO, POC: 2.4

## 2024-05-30 PROCEDURE — 99211 OFF/OP EST MAY X REQ PHY/QHP: CPT

## 2024-05-30 PROCEDURE — 85610 PROTHROMBIN TIME: CPT

## 2024-05-30 NOTE — PROGRESS NOTES
Mr. Stephen Kitchen is a 54 y.o. y/o male with history of pulmonary embolism with infarction. He has been taking warfarin since 2001   He presents today for anticoagulation monitoring and adjustment.  Pertinent PMH: had a DVT and two pulmonary e/mbolism. (+) hepatitis B (2/2016)  Patient Reported Findings:  Yes     No  [x]   []       Patient verifies current dosing regimen as listed- confirms---> has been doing half on Sat only ---> confirmed    []   [x]       S/S bleeding/bruising/swelling/SOB- denies   []   [x]       Blood in urine or stool- denies ---> bloody nose---> none    [x]   []       Procedures scheduled in the future at this time- possible colonoscopy, not yet scheduled, will call when colonoscopy scheduled---> Oct 6th, no holding instructions or clearance yet but states will not do lovenox so most likely just hold 5 days --> cancelled d/t car trouble---> plans to R/S --> colonoscopy scheduled 9/5, needs clearance from PCP office --> held warfarin 5 days prior and never resumed warfarin post op --> none upcoming --> is going to get colonoscopy in near future, not scheduled yet, going to see pcp first    []   [x]       Missed Dose  has not taken warfarin in 10 days ---> denies --> 11/22 d/t alcohol intkae --> denies---> did miss several weeks ago ---> Sunday---> saturday and Sunday, out of town and forgot meds --> denies   []   [x]       Extra Dose -denies   []   [x]       Change in medications -denies, started centrum for men 9/19 (does contain vit K) -->  had tylenol the last few days---> no new changes---> will call with change--> folinic plus---> denies   []   [x]       Change in health/diet/appetite-- reports he is back in the gym and  has been having green smoothies daily and sal/ad ~4x/week. (This has been his daily vit k intake for ~ 3 days now) plans to continue as such. -->V8 instead of greens in drink, back to feeling better  --> no vit k since end of last month--->V8 in protein drink --> having 4

## 2024-06-20 ENCOUNTER — APPOINTMENT (OUTPATIENT)
Dept: PHARMACY | Age: 54
End: 2024-06-20
Payer: COMMERCIAL

## 2024-06-20 ENCOUNTER — TELEPHONE (OUTPATIENT)
Dept: PHARMACY | Age: 54
End: 2024-06-20

## 2024-06-27 ENCOUNTER — ANTI-COAG VISIT (OUTPATIENT)
Dept: PHARMACY | Age: 54
End: 2024-06-27
Payer: COMMERCIAL

## 2024-06-27 DIAGNOSIS — Z79.01 LONG TERM (CURRENT) USE OF ANTICOAGULANTS: Primary | ICD-10-CM

## 2024-06-27 DIAGNOSIS — I82.91 CHRONIC EMBOLISM AND THROMBOSIS OF UNSPECIFIED VEIN: ICD-10-CM

## 2024-06-27 LAB
INTERNATIONAL NORMALIZATION RATIO, POC: 1.6
PROTHROMBIN TIME, POC: NORMAL

## 2024-06-27 PROCEDURE — 85610 PROTHROMBIN TIME: CPT

## 2024-06-27 PROCEDURE — 99211 OFF/OP EST MAY X REQ PHY/QHP: CPT

## 2024-07-05 NOTE — TELEPHONE ENCOUNTER
Warfarin prescription phoned into [facility] to [address] [id number] under Windy Livingston CNP  Warfarin 7.5 mg tabs  Take 3.75 mg on Tues, Thurs and Sat and 7.5 mg all other days of the week  90 days   2 refills

## 2024-07-08 RX ORDER — WARFARIN SODIUM 7.5 MG/1
TABLET ORAL
Qty: 90 TABLET | Refills: 2 | OUTPATIENT
Start: 2024-07-08

## 2024-07-18 ENCOUNTER — ANTI-COAG VISIT (OUTPATIENT)
Dept: PHARMACY | Age: 54
End: 2024-07-18
Payer: COMMERCIAL

## 2024-07-18 DIAGNOSIS — I82.91 CHRONIC EMBOLISM AND THROMBOSIS OF UNSPECIFIED VEIN: ICD-10-CM

## 2024-07-18 DIAGNOSIS — Z79.01 LONG TERM (CURRENT) USE OF ANTICOAGULANTS: Primary | ICD-10-CM

## 2024-07-18 LAB
INTERNATIONAL NORMALIZATION RATIO, POC: 1.6
PROTHROMBIN TIME, POC: NORMAL

## 2024-07-18 PROCEDURE — 99211 OFF/OP EST MAY X REQ PHY/QHP: CPT | Performed by: PHYSICIAN ASSISTANT

## 2024-07-18 PROCEDURE — 85610 PROTHROMBIN TIME: CPT | Performed by: PHYSICIAN ASSISTANT

## 2024-07-18 NOTE — PROGRESS NOTES
Mr. Stephen Kitchen is a 54 y.o. y/o male with history of pulmonary embolism with infarction. He has been taking warfarin since 2001   He presents today for anticoagulation monitoring and adjustment.  Pertinent PMH: had a DVT and two pulmonary e/mbolism. (+) hepatitis B (2/2016)  Patient Reported Findings:  Yes     No  [x]   []       Patient verifies current dosing regimen as listed- confirms---> has been doing half on Sat only ---> confirmed    []   [x]       S/S bleeding/bruising/swelling/SOB- denies   []   [x]       Blood in urine or stool- denies ---> bloody nose---> none    [x]   []       Procedures scheduled in the future at this time- possible colonoscopy, not yet scheduled, will call when colonoscopy scheduled---> Oct 6th, no holding instructions or clearance yet but states will not do lovenox so most likely just hold 5 days --> cancelled d/t car trouble---> plans to R/S --> colonoscopy scheduled 9/5, needs clearance from PCP office --> held warfarin 5 days prior and never resumed warfarin post op --> none upcoming --> is going to get colonoscopy in near future, not scheduled yet, going to see pcp first    [x]   []       Missed Dose  has not taken warfarin in 10 days ---> denies --> 11/22 d/t alcohol intkae --> denies---> did miss several weeks ago ---> Sunday---> saturday and Sunday, out of town and forgot meds --> 6/22 and 6/23 d/t drinking. --> sat   []   [x]       Extra Dose -denies   []   [x]       Change in medications -denies, started centrum for men 9/19 (does contain vit K) -->  had tylenol the last few days---> no new changes---> will call with change--> folinic plus---> denies   [x]   []       Change in health/diet/appetite-- reports he is back in the gym and  has been having green smoothies daily and sal/ad ~4x/week. (This has been his daily vit k intake for ~ 3 days now) plans to continue as such. -->V8 instead of greens in drink, back to feeling better  --> no vit k since end of last month--->V8

## 2024-07-20 DIAGNOSIS — N52.9 DIFFICULTY ATTAINING ERECTION: ICD-10-CM

## 2024-07-22 RX ORDER — TADALAFIL 5 MG/1
TABLET ORAL
Qty: 30 TABLET | Refills: 0 | OUTPATIENT
Start: 2024-07-22

## 2024-07-26 DIAGNOSIS — N52.9 DIFFICULTY ATTAINING ERECTION: ICD-10-CM

## 2024-07-26 RX ORDER — TADALAFIL 5 MG/1
TABLET ORAL
Qty: 30 TABLET | Refills: 0 | Status: SHIPPED | OUTPATIENT
Start: 2024-07-26

## 2024-08-01 ENCOUNTER — ANTI-COAG VISIT (OUTPATIENT)
Dept: PHARMACY | Age: 54
End: 2024-08-01
Payer: COMMERCIAL

## 2024-08-01 DIAGNOSIS — Z79.01 LONG TERM (CURRENT) USE OF ANTICOAGULANTS: Primary | ICD-10-CM

## 2024-08-01 DIAGNOSIS — I82.91 CHRONIC EMBOLISM AND THROMBOSIS OF UNSPECIFIED VEIN: ICD-10-CM

## 2024-08-01 LAB
INTERNATIONAL NORMALIZATION RATIO, POC: 1.2
PROTHROMBIN TIME, POC: NORMAL

## 2024-08-01 PROCEDURE — 85610 PROTHROMBIN TIME: CPT

## 2024-08-01 PROCEDURE — 99211 OFF/OP EST MAY X REQ PHY/QHP: CPT

## 2024-08-01 ASSESSMENT — PATIENT HEALTH QUESTIONNAIRE - PHQ9
SUM OF ALL RESPONSES TO PHQ9 QUESTIONS 1 & 2: 1
2. FEELING DOWN, DEPRESSED OR HOPELESS: NOT AT ALL
SUM OF ALL RESPONSES TO PHQ9 QUESTIONS 1 & 2: 1
2. FEELING DOWN, DEPRESSED OR HOPELESS: NOT AT ALL
SUM OF ALL RESPONSES TO PHQ QUESTIONS 1-9: 1
SUM OF ALL RESPONSES TO PHQ QUESTIONS 1-9: 1
1. LITTLE INTEREST OR PLEASURE IN DOING THINGS: SEVERAL DAYS
SUM OF ALL RESPONSES TO PHQ QUESTIONS 1-9: 1
SUM OF ALL RESPONSES TO PHQ QUESTIONS 1-9: 1
1. LITTLE INTEREST OR PLEASURE IN DOING THINGS: SEVERAL DAYS

## 2024-08-02 ENCOUNTER — OFFICE VISIT (OUTPATIENT)
Dept: INTERNAL MEDICINE CLINIC | Age: 54
End: 2024-08-02

## 2024-08-02 VITALS
WEIGHT: 178 LBS | OXYGEN SATURATION: 97 % | SYSTOLIC BLOOD PRESSURE: 120 MMHG | BODY MASS INDEX: 29.62 KG/M2 | HEART RATE: 52 BPM | DIASTOLIC BLOOD PRESSURE: 78 MMHG | TEMPERATURE: 97.8 F

## 2024-08-02 DIAGNOSIS — R97.20 ELEVATED PSA: ICD-10-CM

## 2024-08-02 DIAGNOSIS — E78.2 MIXED HYPERLIPIDEMIA: ICD-10-CM

## 2024-08-02 DIAGNOSIS — R53.83 OTHER FATIGUE: ICD-10-CM

## 2024-08-02 DIAGNOSIS — Z12.11 SCREEN FOR COLON CANCER: ICD-10-CM

## 2024-08-02 DIAGNOSIS — F12.10 MARIJUANA ABUSE: ICD-10-CM

## 2024-08-02 DIAGNOSIS — Z00.00 ANNUAL PHYSICAL EXAM: Primary | ICD-10-CM

## 2024-08-02 DIAGNOSIS — Z86.711 HISTORY OF PULMONARY EMBOLISM: ICD-10-CM

## 2024-08-02 DIAGNOSIS — N52.9 DIFFICULTY ATTAINING ERECTION: ICD-10-CM

## 2024-08-02 RX ORDER — TADALAFIL 10 MG/1
TABLET ORAL
Qty: 30 TABLET | Refills: 1 | Status: SHIPPED | OUTPATIENT
Start: 2024-08-02

## 2024-08-02 SDOH — ECONOMIC STABILITY: FOOD INSECURITY: WITHIN THE PAST 12 MONTHS, YOU WORRIED THAT YOUR FOOD WOULD RUN OUT BEFORE YOU GOT MONEY TO BUY MORE.: NEVER TRUE

## 2024-08-02 SDOH — ECONOMIC STABILITY: HOUSING INSECURITY
IN THE LAST 12 MONTHS, WAS THERE A TIME WHEN YOU DID NOT HAVE A STEADY PLACE TO SLEEP OR SLEPT IN A SHELTER (INCLUDING NOW)?: NO

## 2024-08-02 SDOH — ECONOMIC STABILITY: FOOD INSECURITY: WITHIN THE PAST 12 MONTHS, THE FOOD YOU BOUGHT JUST DIDN'T LAST AND YOU DIDN'T HAVE MONEY TO GET MORE.: NEVER TRUE

## 2024-08-02 SDOH — ECONOMIC STABILITY: INCOME INSECURITY: HOW HARD IS IT FOR YOU TO PAY FOR THE VERY BASICS LIKE FOOD, HOUSING, MEDICAL CARE, AND HEATING?: NOT HARD AT ALL

## 2024-08-02 ASSESSMENT — ENCOUNTER SYMPTOMS
VOMITING: 0
WHEEZING: 0
COUGH: 0
CONSTIPATION: 0
SHORTNESS OF BREATH: 0
NAUSEA: 0
DIARRHEA: 0
ABDOMINAL PAIN: 0

## 2024-08-02 NOTE — PROGRESS NOTES
Annual Physical Office Visit  8/2/2024    Subjective:  Chief Complaint   Patient presents with    Annual Exam     fatigue with exercising - no other symptoms associated.     HPI:  Stephen Kitchen is a 54 y.o. male who presents to the clinic today for an annual physical. Also has an acute concern.    History of 2 PEs- chronically maintained on Coumadin and is seen in the Coumadin clinic regularly.      Elevated PSA in 2010. Asymptomatic. States he has had urinary urgency since 2010. Stable. Denies UTI symptoms. States he saw urology last year.     Trouble obtaining an erection- taking Cialis as needed and patient reports this is is not working as well. Thinks he needs a higher dose. Denies side effects.     Marijuana abuse. Not ready to cut down.     Hyperlipidemia- exercises daily.    Has an acute concern:  Reports fatigue when he exercises. States that this has been present for a month. states he only sleeps 4 hours a night.   No other associated symptoms.  Denies snoring.  Denies chest pain, palpitations, shortness of breath, trouble breathing, lightheadedness, dizziness or blurred vision. Denies fevers/chills.    From [place]. Lives alone. Disabled since 2001. For fun, he enjoys exercising.    Review of Systems   Constitutional:  Positive for fatigue (with certain exercise). Negative for chills and fever.   Respiratory:  Negative for cough, shortness of breath and wheezing.    Cardiovascular:  Negative for chest pain, palpitations and leg swelling.   Gastrointestinal:  Negative for abdominal pain, constipation, diarrhea, nausea and vomiting.   Genitourinary:  Negative for dysuria and frequency.   Skin:  Negative for pallor and rash.   Neurological:  Negative for dizziness, weakness, light-headedness, numbness and headaches.   Psychiatric/Behavioral:  Negative for dysphoric mood, self-injury, sleep disturbance and suicidal ideas. The patient is not nervous/anxious.      Allergies   Allergen Reactions    Morphine

## 2024-08-05 ENCOUNTER — CARE COORDINATION (OUTPATIENT)
Dept: CARE COORDINATION | Age: 54
End: 2024-08-05

## 2024-08-05 NOTE — CARE COORDINATION
Ambulatory Care Coordination Note     2024 1:41 PM     Patient Current Location:  [place]     This patient was received as a referral from Ambulatory Care Manager .    Patient contacted the patient by telephone. Verified name and  with patient as identifiers. Provided introduction to self, and explanation of the ACM role.   Patient accepted care management services at this time.          ACM: Leticia Baer RN     Challenges to be reviewed by the provider   Additional needs identified to be addressed with provider No  none               Method of communication with provider: none.    Care Summary Note:     Pcp referral to care management; assistance with transportation to colonoscopy.     ACM outreached patient; introduced self and role of care coordinator. Patient is independent with ADLs and drives but is in need of transportation to his colonoscopy. Patient states he moved to [place] and does not have any friends/family that can take him. Patient was scheduled for a colonoscopy a few months ago but had to cancel because his [employer] didn't show up to take him.     Patient states he is not a candidate for FIT or Cologuard testing.     Patient aware ACM will outreach  for recommendations. Transportation to procedures can be complicated. Patient states he will outreach [place] [employer] regarding resources.   Patient aware ACM will follow up in 2-3 days with findings.     Offered patient enrollment in the Remote Patient Monitoring (RPM) program for in-home monitoring:      Assessments Completed:   N/A    Medications Reviewed:   Not completed during this call:      Advance Care Planning:   Not reviewed during this call     Care Planning:   Not completed during this call    PCP/Specialist follow up:   Future Appointments         Provider Specialty Dept Phone    8/15/2024 8:45 AM [facility] [phone]    2024 9:00 AM Windy Livingston, JEAN CLAUDE - [facility] [phone]

## 2024-08-08 ENCOUNTER — CARE COORDINATION (OUTPATIENT)
Dept: CARE COORDINATION | Age: 54
End: 2024-08-08

## 2024-08-08 NOTE — CARE COORDINATION
Ambulatory Care Coordination Note     2024 8:53 AM     Patient Current Location:  [place]     Patient contacted the patient by telephone. Verified name and  with patient as identifiers.         ACM: Leticia Baer RN     Challenges to be reviewed by the provider   Additional needs identified to be addressed with provider No  none               Method of communication with provider: none.    Care Summary Note:     ACM outreached patient for cc follow up; transportation to colonoscopy. Unfortunately, patient can only use public transportation if he has an adult over 18 to accompany him. Patient states he did not call [place] regarding transportation because his daughter has agreed to take him. Patient states he was reluctant to ask his daughter because she lives in [place] and he is fearful for her to drive the highway but now understands he needs to move forward with testing. Patient has not scheduled with GI. ACM offered to assist with scheduling, patient agreeable.     ACM outreached [facility] regarding scheduling; spoke with Masoud. AC unable to schedule on behalf of patient. Masoud states a representative with scheduling dept. Will call the patient to schedule the procedure. [facility] crisostomo scheduling did outreach the patient on  but patient has not returned their call.     ACM followed up with patient. Informed him that [facility] attempted to call him on  to schedule his colonoscopy. Patient states he did have a missed call from  and believes he has a message on his VM. AC offered to provide patient with the contact information to [facility] but patient states he has it. Patient states he intends to follow up on scheduling. Patient aware ACM will f/u in 1 week regarding appointment. No other questions or concerns voiced.        Offered patient enrollment in the Remote Patient Monitoring (RPM) program for in-home monitoring:     Assessments Completed:   No changes since last call    Medications Reviewed:   Not

## 2024-08-15 ENCOUNTER — HOSPITAL ENCOUNTER (OUTPATIENT)
Age: 54
Discharge: HOME | End: 2024-08-15
Payer: COMMERCIAL

## 2024-08-15 ENCOUNTER — ANTI-COAG VISIT (OUTPATIENT)
Dept: PHARMACY | Age: 54
End: 2024-08-15
Payer: COMMERCIAL

## 2024-08-15 DIAGNOSIS — Z79.01 LONG TERM (CURRENT) USE OF ANTICOAGULANTS: Primary | ICD-10-CM

## 2024-08-15 DIAGNOSIS — R97.20 ELEVATED PSA: ICD-10-CM

## 2024-08-15 DIAGNOSIS — R53.83 OTHER FATIGUE: ICD-10-CM

## 2024-08-15 DIAGNOSIS — E78.2 MIXED HYPERLIPIDEMIA: ICD-10-CM

## 2024-08-15 DIAGNOSIS — I82.91 CHRONIC EMBOLISM AND THROMBOSIS OF UNSPECIFIED VEIN: ICD-10-CM

## 2024-08-15 LAB
ALBUMIN SERPL-MCNC: 4.5 G/DL (ref 3.4–5)
ALBUMIN/GLOB SERPL: 1.8 {RATIO} (ref 1.1–2.2)
ALP SERPL-CCNC: 97 U/L (ref 40–129)
ALT SERPL-CCNC: 16 U/L (ref 10–40)
ANION GAP SERPL CALCULATED.3IONS-SCNC: 10 MMOL/L (ref 3–16)
AST SERPL-CCNC: 19 U/L (ref 15–37)
BILIRUB SERPL-MCNC: 0.7 MG/DL (ref 0–1)
BUN SERPL-MCNC: 14 MG/DL (ref 7–20)
CALCIUM SERPL-MCNC: 9.7 MG/DL (ref 8.3–10.6)
CHLORIDE SERPL-SCNC: 102 MMOL/L (ref 99–110)
CHOLEST SERPL-MCNC: 257 MG/DL (ref 0–199)
CO2 SERPL-SCNC: 23 MMOL/L (ref 21–32)
CREAT SERPL-MCNC: 1.1 MG/DL (ref 0.9–1.3)
GFR SERPLBLD CREATININE-BSD FMLA CKD-EPI: 80 ML/MIN/{1.73_M2}
GLUCOSE SERPL-MCNC: 105 MG/DL (ref 70–99)
HDLC SERPL-MCNC: 58 MG/DL (ref 40–60)
INTERNATIONAL NORMALIZATION RATIO, POC: 1.9
LDL CHOLESTEROL: 172 MG/DL
POTASSIUM SERPL-SCNC: 3.7 MMOL/L (ref 3.5–5.1)
PROT SERPL-MCNC: 7 G/DL (ref 6.4–8.2)
PROTHROMBIN TIME, POC: 0
PSA SERPL DL<=0.01 NG/ML-MCNC: 4.65 NG/ML (ref 0–4)
SODIUM SERPL-SCNC: 135 MMOL/L (ref 136–145)
TRIGL SERPL-MCNC: 134 MG/DL (ref 0–150)
TSH SERPL DL<=0.005 MIU/L-ACNC: 1.42 UIU/ML (ref 0.27–4.2)
VLDLC SERPL CALC-MCNC: 27 MG/DL

## 2024-08-15 PROCEDURE — 99211 OFF/OP EST MAY X REQ PHY/QHP: CPT

## 2024-08-15 PROCEDURE — 36415 COLL VENOUS BLD VENIPUNCTURE: CPT

## 2024-08-15 PROCEDURE — 84153 ASSAY OF PSA TOTAL: CPT

## 2024-08-15 PROCEDURE — 80061 LIPID PANEL: CPT

## 2024-08-15 PROCEDURE — 85610 PROTHROMBIN TIME: CPT

## 2024-08-15 PROCEDURE — 80053 COMPREHEN METABOLIC PANEL: CPT

## 2024-08-15 PROCEDURE — 84443 ASSAY THYROID STIM HORMONE: CPT

## 2024-08-15 NOTE — PROGRESS NOTES
Mr. Stephen Kitchen is a 54 y.o. y/o male with history of pulmonary embolism with infarction. He has been taking warfarin since 2001   He presents today for anticoagulation monitoring and adjustment.  Pertinent PMH: had a DVT and two pulmonary e/mbolism. (+) hepatitis B (2/2016)  Patient Reported Findings:  Yes     No  [x]   []       Patient verifies current dosing regimen as listed- confirms---> has been doing half on Sat only ---> confirmed    []   [x]       S/S bleeding/bruising/swelling/SOB- denies   []   [x]       Blood in urine or stool- denies ---> bloody nose---> none    [x]   []       Procedures scheduled in the future at this time- possible colonoscopy, not yet scheduled, will call when colonoscopy scheduled---> Oct 6th, no holding instructions or clearance yet but states will not do lovenox so most likely just hold 5 days --> cancelled d/t car trouble---> plans to R/S --> colonoscopy scheduled 9/5, needs clearance from PCP office --> held warfarin 5 days prior and never resumed warfarin post op --> none upcoming --> is going to get colonoscopy in near future, not scheduled yet, going to see pcp first---> not scheduled yet    [x]   []       Missed Dose  has not taken warfarin in 10 days ---> denies --> 11/22 d/t alcohol intkae --> denies---> did miss several weeks ago ---> Sunday---> saturday and Sunday, out of town and forgot meds --> 6/22 and 6/23 d/t drinking. --> sat ---> denies   []   [x]       Extra Dose -denies   []   [x]       Change in medications -denies, started centrum for men 9/19 (does contain vit K) -->  had tylenol the last few days---> no new changes---> will call with change--> folinic plus---> denies   [x]   []       Change in health/diet/appetite-- reports he is back in the gym and  has been having green smoothies daily and sal/ad ~4x/week. (This has been his daily vit k intake for ~ 3 days now) plans to continue as such. -->V8 instead of greens in drink, back to feeling better  --> no vit

## 2024-08-16 ENCOUNTER — CARE COORDINATION (OUTPATIENT)
Dept: CARE COORDINATION | Age: 54
End: 2024-08-16

## 2024-08-16 LAB
BASOPHILS # BLD: 0 K/UL (ref 0–0.2)
BASOPHILS NFR BLD: 0 %
DEPRECATED RDW RBC AUTO: 13.4 % (ref 12.4–15.4)
EOSINOPHIL # BLD: 0 K/UL (ref 0–0.6)
EOSINOPHIL NFR BLD: 1 %
HCT VFR BLD AUTO: 47.7 % (ref 40.5–52.5)
HGB BLD-MCNC: 16.6 G/DL (ref 13.5–17.5)
LYMPHOCYTES # BLD: 1.6 K/UL (ref 1–5.1)
LYMPHOCYTES NFR BLD: 33 %
MCH RBC QN AUTO: 33.3 PG (ref 26–34)
MCHC RBC AUTO-ENTMCNC: 34.8 G/DL (ref 31–36)
MCV RBC AUTO: 95.9 FL (ref 80–100)
MONOCYTES # BLD: 0.5 K/UL (ref 0–1.3)
MONOCYTES NFR BLD: 10 %
NEUTROPHILS # BLD: 2.7 K/UL (ref 1.7–7.7)
NEUTROPHILS NFR BLD: 56 %
PLATELET # BLD AUTO: 202 K/UL (ref 135–450)
PLATELET BLD QL SMEAR: ADEQUATE
PMV BLD AUTO: 7.4 FL (ref 5–10.5)
RBC # BLD AUTO: 4.97 M/UL (ref 4.2–5.9)
SLIDE REVIEW: NORMAL
WBC # BLD AUTO: 4.9 K/UL (ref 4–11)

## 2024-08-16 NOTE — CARE COORDINATION
Ambulatory Care Coordination Note     2024 8:57 AM     Patient Current Location:  [place]     ACM contacted the patient by telephone. Verified name and  with patient as identifiers.         ACM: Leticia Baer RN     Challenges to be reviewed by the provider   Additional needs identified to be addressed with provider No  none               Method of communication with provider: none.    Care Summary Note:     ACM outreached patient regarding colonoscopy. Patient states his daughter can no longer take him. Patient has not returned call to [facility], states he is trying to secure transportation first.     AC will outreach pcp to determine if FIT or Cologuard would be appropriate screening.     Offered patient enrollment in the Remote Patient Monitoring (RPM) program for in-home monitoring: N/A     Assessments Completed:   No changes since last call    Medications Reviewed:   Not completed during this call:      Advance Care Planning:   Not reviewed during this call     Care Planning:   Not completed during this call    PCP/Specialist follow up:   Future Appointments         Provider Specialty Dept Phone    2024 8:45 AM [facility] Pharmacy [phone]    2024 9:00 AM Windy Livingston APRN - CNP Internal Medicine [phone]    2025 8:20 AM Windy Livingston APRN - CNP Internal Medicine [phone]            Follow Up:   Plan for next AC outreach in approximately 1 week to complete:  -colon cancer screening  Patient  is agreeable to this plan.

## 2024-08-20 ENCOUNTER — CARE COORDINATION (OUTPATIENT)
Dept: CARE COORDINATION | Age: 54
End: 2024-08-20

## 2024-08-20 NOTE — CARE COORDINATION
Ambulatory Care Coordination Note     8/20/2024 2:13 PM     Patient outreach attempt by this ACM today to perform care management follow up . M was unable to reach the patient by telephone today; left voice message requesting a return phone call to this ACM.     ACM: Leticia Baer RN     Care Summary Note:     Discuss FIT/Cologuard    PCP/Specialist follow up:   Future Appointments         Provider Specialty Dept Phone    8/29/2024 8:45 AM [facility] Pharmacy [phone]    9/4/2024 9:00 AM Windy Livingston APRN - CNP Internal Medicine [phone]    8/5/2025 8:20 AM Windy Livingston APRN - CNP Internal Medicine [phone]            Follow Up:   Plan for next AC outreach in approximately 1 week to complete:  Discuss FIT/Cologuard .

## 2024-08-21 DIAGNOSIS — R73.09 ELEVATED GLUCOSE: Primary | ICD-10-CM

## 2024-08-22 ENCOUNTER — TELEPHONE (OUTPATIENT)
Dept: INTERNAL MEDICINE CLINIC | Age: 54
End: 2024-08-22

## 2024-08-22 ENCOUNTER — CARE COORDINATION (OUTPATIENT)
Dept: CARE COORDINATION | Age: 54
End: 2024-08-22

## 2024-08-22 DIAGNOSIS — R73.09 ELEVATED GLUCOSE: Primary | ICD-10-CM

## 2024-08-22 PROCEDURE — 83036 HEMOGLOBIN GLYCOSYLATED A1C: CPT | Performed by: NURSE PRACTITIONER

## 2024-08-22 NOTE — CARE COORDINATION
Ambulatory Care Coordination Note     2024 3:59 PM     Patient Current Location:  [place]     ACM contacted the patient by telephone. Verified name and  with patient as identifiers.         ACM: Leticia Baer RN     Challenges to be reviewed by the provider   Additional needs identified to be addressed with provider No  none               Method of communication with provider: chart routing.    Care Summary Note:     ACM outreached patient regarding colon cancer screening. ACM offered alternative screening options such as Cologuard or FIT. Patient states he has secured transportation for his colonoscopy and will call GI to schedule as soon as discusses available dates with his daughter. Patient states he is going to  his daughter from her home and she will transport him to his colonoscopy and stay with him overnight. Patient declined alternative screening options at this time. Patient agreeable to follow up from [facility] in 2-3 weeks.     Offered patient enrollment in the Remote Patient Monitoring (RPM) program for in-home monitoring.N/A       PCP/Specialist follow up:   Future Appointments         Provider Specialty Dept Phone    2024 8:45 AM [facility] Pharmacy [phone]    2024 9:00 AM Windy Livingston APRN - CNP Internal Medicine [phone]    2025 8:20 AM Windy Livingston APRN - CNP Internal Medicine [phone]            Follow Up:   Plan for next AC outreach in approximately 2 weeks to complete:  - colonoscopy .   Patient  is agreeable to this plan.

## 2024-08-22 NOTE — TELEPHONE ENCOUNTER
Called to inform pt A1c lab was not added on to previous blood work d/t past stability Pt to come in on 8/27 for a point of care

## 2024-08-27 ENCOUNTER — NURSE ONLY (OUTPATIENT)
Dept: INTERNAL MEDICINE CLINIC | Age: 54
End: 2024-08-27

## 2024-08-27 LAB — HBA1C MFR BLD: 5.8 %

## 2024-08-29 ENCOUNTER — APPOINTMENT (OUTPATIENT)
Dept: PHARMACY | Age: 54
End: 2024-08-29
Payer: COMMERCIAL

## 2024-08-29 ENCOUNTER — TELEPHONE (OUTPATIENT)
Dept: PHARMACY | Age: 54
End: 2024-08-29

## 2024-09-04 ENCOUNTER — CARE COORDINATION (OUTPATIENT)
Dept: CARE COORDINATION | Age: 54
End: 2024-09-04

## 2024-09-04 ENCOUNTER — OFFICE VISIT (OUTPATIENT)
Dept: INTERNAL MEDICINE CLINIC | Age: 54
End: 2024-09-04
Payer: COMMERCIAL

## 2024-09-04 VITALS
OXYGEN SATURATION: 98 % | TEMPERATURE: 97.4 F | HEIGHT: 65 IN | SYSTOLIC BLOOD PRESSURE: 132 MMHG | BODY MASS INDEX: 30.16 KG/M2 | DIASTOLIC BLOOD PRESSURE: 80 MMHG | WEIGHT: 181 LBS | HEART RATE: 50 BPM

## 2024-09-04 DIAGNOSIS — Z71.85 VACCINE COUNSELING: ICD-10-CM

## 2024-09-04 DIAGNOSIS — Z12.11 SCREEN FOR COLON CANCER: ICD-10-CM

## 2024-09-04 DIAGNOSIS — N52.9 DIFFICULTY ATTAINING ERECTION: Primary | ICD-10-CM

## 2024-09-04 DIAGNOSIS — R53.83 OTHER FATIGUE: ICD-10-CM

## 2024-09-04 PROCEDURE — 99213 OFFICE O/P EST LOW 20 MIN: CPT | Performed by: NURSE PRACTITIONER

## 2024-09-04 PROCEDURE — G8417 CALC BMI ABV UP PARAM F/U: HCPCS | Performed by: NURSE PRACTITIONER

## 2024-09-04 PROCEDURE — 3017F COLORECTAL CA SCREEN DOC REV: CPT | Performed by: NURSE PRACTITIONER

## 2024-09-04 PROCEDURE — G8427 DOCREV CUR MEDS BY ELIG CLIN: HCPCS | Performed by: NURSE PRACTITIONER

## 2024-09-04 PROCEDURE — 1036F TOBACCO NON-USER: CPT | Performed by: NURSE PRACTITIONER

## 2024-09-04 ASSESSMENT — ENCOUNTER SYMPTOMS
COUGH: 0
SHORTNESS OF BREATH: 0
WHEEZING: 0
CONSTIPATION: 0
DIARRHEA: 0
ABDOMINAL PAIN: 0
NAUSEA: 0
VOMITING: 0
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
## 2024-09-04 NOTE — PROGRESS NOTES
Office Visit  9/4/2024    Subjective:  Chief Complaint   Patient presents with    Follow-up     HPI:   Stephen Kitchen is a 54 y.o. male who presents to the clinic today for follow up.    Difficulty attaining or direction-last visit, the patient was started on Cialis as needed.  Patient reports that this works well when used.  Denies side effects    Colon cancer screening-working with care coordination.  Would like to complete a colonoscopy.  He states his daughter will drive him. Plans to schedule this appt today.     8/2/2024 9/4/2024   Vitals     Weight - Scale 178 lb  181 lb      Review of Systems   Constitutional:  Negative for chills, fatigue and fever.   Respiratory:  Negative for cough, shortness of breath and wheezing.    Cardiovascular:  Negative for chest pain, palpitations and leg swelling.   Gastrointestinal:  Negative for abdominal pain, constipation, diarrhea, nausea and vomiting.   Skin:  Negative for pallor and rash.   Neurological:  Negative for dizziness, weakness, light-headedness, numbness and headaches.   Psychiatric/Behavioral:  Negative for dysphoric mood, self-injury, sleep disturbance and suicidal ideas. The patient is not nervous/anxious.      Allergies   Allergen Reactions    Morphine Nausea And Vomiting     Current Outpatient Rx   Medication Sig Dispense Refill    tadalafil (CIALIS) 10 MG tablet TAKE 1 TABLET BY MOUTH AT LEAST 30 MINUTES PRIOR TO ANTICIPATED SEXUAL ACTIVITY AS ONE SINGLE DOSE AND NOT MORE THAN ONCE DAILY 30 tablet 1    warfarin (COUMADIN) 7.5 MG tablet Take 1 tablet by mouth See Admin Instructions 7.5 mg 6 days/week and 3.75 mg on Saturdays       Patient Active Problem List   Diagnosis    Other pulmonary embolism and infarction    GI bleed    Acute hepatitis B    Hematemesis    Chronic embolism and thrombosis of unspecified vein    Long term (current) use of anticoagulants      Wt Readings from Last 3 Encounters:   09/04/24 82.1 kg (181 lb)   08/02/24 80.7 kg (178 lb)

## 2024-09-04 NOTE — PATIENT INSTRUCTIONS
Chriss Jefferson  [address] [facility] [address] Loc/POS:                Phone:   [phone]    Call to schedule colonoscopy

## 2024-09-05 ENCOUNTER — APPOINTMENT (OUTPATIENT)
Dept: PHARMACY | Age: 54
End: 2024-09-05
Payer: COMMERCIAL

## 2024-09-12 ENCOUNTER — ANTI-COAG VISIT (OUTPATIENT)
Dept: PHARMACY | Age: 54
End: 2024-09-12
Payer: COMMERCIAL

## 2024-09-12 DIAGNOSIS — I82.91 CHRONIC EMBOLISM AND THROMBOSIS OF UNSPECIFIED VEIN: ICD-10-CM

## 2024-09-12 DIAGNOSIS — Z79.01 LONG TERM (CURRENT) USE OF ANTICOAGULANTS: Primary | ICD-10-CM

## 2024-09-12 LAB
INTERNATIONAL NORMALIZATION RATIO, POC: 2.1
PROTHROMBIN TIME, POC: 0

## 2024-09-12 PROCEDURE — 99211 OFF/OP EST MAY X REQ PHY/QHP: CPT

## 2024-09-12 PROCEDURE — 85610 PROTHROMBIN TIME: CPT

## 2024-09-18 ENCOUNTER — CARE COORDINATION (OUTPATIENT)
Dept: CARE COORDINATION | Age: 54
End: 2024-09-18

## 2024-09-27 ENCOUNTER — APPOINTMENT (OUTPATIENT)
Dept: GENERAL RADIOLOGY | Age: 54
End: 2024-09-27
Payer: COMMERCIAL

## 2024-09-27 ENCOUNTER — APPOINTMENT (OUTPATIENT)
Dept: CT IMAGING | Age: 54
End: 2024-09-27
Payer: COMMERCIAL

## 2024-09-27 ENCOUNTER — TELEPHONE (OUTPATIENT)
Dept: PHARMACY | Age: 54
End: 2024-09-27

## 2024-09-27 ENCOUNTER — HOSPITAL ENCOUNTER (EMERGENCY)
Age: 54
Discharge: HOME | End: 2024-09-27
Attending: EMERGENCY MEDICINE
Payer: COMMERCIAL

## 2024-09-27 VITALS
OXYGEN SATURATION: 97 % | DIASTOLIC BLOOD PRESSURE: 97 MMHG | HEART RATE: 61 BPM | SYSTOLIC BLOOD PRESSURE: 167 MMHG | HEIGHT: 66 IN | RESPIRATION RATE: 18 BRPM | BODY MASS INDEX: 29.15 KG/M2 | WEIGHT: 181.4 LBS | TEMPERATURE: 99.3 F

## 2024-09-27 DIAGNOSIS — L03.115 CELLULITIS OF RIGHT THIGH: Primary | ICD-10-CM

## 2024-09-27 LAB
ANION GAP SERPL CALCULATED.3IONS-SCNC: 14 MMOL/L (ref 3–16)
BASOPHILS # BLD: 0.1 K/UL (ref 0–0.2)
BASOPHILS NFR BLD: 0.8 %
BILIRUB UR QL STRIP.AUTO: NEGATIVE
BUN SERPL-MCNC: 10 MG/DL (ref 7–20)
CALCIUM SERPL-MCNC: 8.9 MG/DL (ref 8.3–10.6)
CHLORIDE SERPL-SCNC: 105 MMOL/L (ref 99–110)
CLARITY UR: CLEAR
CO2 SERPL-SCNC: 19 MMOL/L (ref 21–32)
COLOR UR: YELLOW
CREAT SERPL-MCNC: 0.9 MG/DL (ref 0.9–1.3)
CRP SERPL-MCNC: 79.4 MG/L (ref 0–5.1)
D-DIMER QUANTITATIVE: 0.33 UG/ML FEU (ref 0–0.6)
DEPRECATED RDW RBC AUTO: 13.8 % (ref 12.4–15.4)
EOSINOPHIL # BLD: 0 K/UL (ref 0–0.6)
EOSINOPHIL NFR BLD: 0.3 %
EPI CELLS #/AREA URNS HPF: NORMAL /HPF (ref 0–5)
ERYTHROCYTE [SEDIMENTATION RATE] IN BLOOD BY WESTERGREN METHOD: 27 MM/HR (ref 0–20)
FLUAV RNA RESP QL NAA+PROBE: NOT DETECTED
FLUBV RNA RESP QL NAA+PROBE: NOT DETECTED
GFR SERPLBLD CREATININE-BSD FMLA CKD-EPI: >90 ML/MIN/{1.73_M2}
GLUCOSE SERPL-MCNC: 115 MG/DL (ref 70–99)
GLUCOSE UR STRIP.AUTO-MCNC: NEGATIVE MG/DL
HCT VFR BLD AUTO: 43.3 % (ref 40.5–52.5)
HGB BLD-MCNC: 15.1 G/DL (ref 13.5–17.5)
HGB UR QL STRIP.AUTO: NEGATIVE
INR PPP: 3.1 (ref 0.85–1.15)
KETONES UR STRIP.AUTO-MCNC: NEGATIVE MG/DL
LEUKOCYTE ESTERASE UR QL STRIP.AUTO: NEGATIVE
LYMPHOCYTES # BLD: 1.3 K/UL (ref 1–5.1)
LYMPHOCYTES NFR BLD: 15.3 %
MCH RBC QN AUTO: 33.7 PG (ref 26–34)
MCHC RBC AUTO-ENTMCNC: 34.9 G/DL (ref 31–36)
MCV RBC AUTO: 96.6 FL (ref 80–100)
MONOCYTES # BLD: 0.8 K/UL (ref 0–1.3)
MONOCYTES NFR BLD: 9.6 %
NEUTROPHILS # BLD: 6.2 K/UL (ref 1.7–7.7)
NEUTROPHILS NFR BLD: 74 %
NITRITE UR QL STRIP.AUTO: NEGATIVE
PH UR STRIP.AUTO: 6.5 [PH] (ref 5–8)
PLATELET # BLD AUTO: 193 K/UL (ref 135–450)
PMV BLD AUTO: 7 FL (ref 5–10.5)
POTASSIUM SERPL-SCNC: 4 MMOL/L (ref 3.5–5.1)
PROT UR STRIP.AUTO-MCNC: ABNORMAL MG/DL
PROTHROMBIN TIME: 31.8 SEC (ref 11.9–14.9)
RBC # BLD AUTO: 4.48 M/UL (ref 4.2–5.9)
RBC #/AREA URNS HPF: NORMAL /HPF (ref 0–4)
SARS-COV-2 RNA RESP QL NAA+PROBE: NOT DETECTED
SODIUM SERPL-SCNC: 138 MMOL/L (ref 136–145)
SP GR UR STRIP.AUTO: >=1.03 (ref 1–1.03)
UA COMPLETE W REFLEX CULTURE PNL UR: ABNORMAL
UA DIPSTICK W REFLEX MICRO PNL UR: YES
URN SPEC COLLECT METH UR: ABNORMAL
UROBILINOGEN UR STRIP-ACNC: 2 E.U./DL
WBC # BLD AUTO: 8.4 K/UL (ref 4–11)
WBC #/AREA URNS HPF: NORMAL /HPF (ref 0–5)

## 2024-09-27 PROCEDURE — 86140 C-REACTIVE PROTEIN: CPT

## 2024-09-27 PROCEDURE — 81001 URINALYSIS AUTO W/SCOPE: CPT

## 2024-09-27 PROCEDURE — 85610 PROTHROMBIN TIME: CPT

## 2024-09-27 PROCEDURE — 85379 FIBRIN DEGRADATION QUANT: CPT

## 2024-09-27 PROCEDURE — 99285 EMERGENCY DEPT VISIT HI MDM: CPT

## 2024-09-27 PROCEDURE — 85025 COMPLETE CBC W/AUTO DIFF WBC: CPT

## 2024-09-27 PROCEDURE — 80048 BASIC METABOLIC PNL TOTAL CA: CPT

## 2024-09-27 PROCEDURE — 73701 CT LOWER EXTREMITY W/DYE: CPT

## 2024-09-27 PROCEDURE — 6370000000 HC RX 637 (ALT 250 FOR IP): Performed by: EMERGENCY MEDICINE

## 2024-09-27 PROCEDURE — 87636 SARSCOV2 & INF A&B AMP PRB: CPT

## 2024-09-27 PROCEDURE — 73552 X-RAY EXAM OF FEMUR 2/>: CPT

## 2024-09-27 PROCEDURE — 85652 RBC SED RATE AUTOMATED: CPT

## 2024-09-27 PROCEDURE — 6360000004 HC RX CONTRAST MEDICATION: Performed by: EMERGENCY MEDICINE

## 2024-09-27 RX ORDER — DOXYCYCLINE HYCLATE 100 MG
100 TABLET ORAL 2 TIMES DAILY
Qty: 14 TABLET | Refills: 0 | Status: SHIPPED | OUTPATIENT
Start: 2024-09-27 | End: 2024-10-04

## 2024-09-27 RX ORDER — CEPHALEXIN 500 MG/1
500 CAPSULE ORAL 4 TIMES DAILY
Qty: 28 CAPSULE | Refills: 0 | Status: SHIPPED | OUTPATIENT
Start: 2024-09-27 | End: 2024-10-04

## 2024-09-27 RX ORDER — IOPAMIDOL 755 MG/ML
75 INJECTION, SOLUTION INTRAVASCULAR
Status: COMPLETED | OUTPATIENT
Start: 2024-09-27 | End: 2024-09-27

## 2024-09-27 RX ORDER — ACETAMINOPHEN 500 MG
1000 TABLET ORAL
Status: COMPLETED | OUTPATIENT
Start: 2024-09-27 | End: 2024-09-27

## 2024-09-27 RX ADMIN — ACETAMINOPHEN 1000 MG: 500 TABLET ORAL at 08:17

## 2024-09-27 RX ADMIN — IOPAMIDOL 75 ML: 755 INJECTION, SOLUTION INTRAVENOUS at 09:10

## 2024-09-27 ASSESSMENT — LIFESTYLE VARIABLES: HOW OFTEN DO YOU HAVE A DRINK CONTAINING ALCOHOL: 2-4 TIMES A MONTH

## 2024-09-27 ASSESSMENT — PAIN DESCRIPTION - LOCATION
LOCATION: KNEE;LEG
LOCATION: KNEE;LEG

## 2024-09-27 ASSESSMENT — PAIN DESCRIPTION - ORIENTATION
ORIENTATION: RIGHT
ORIENTATION: RIGHT

## 2024-09-27 ASSESSMENT — PAIN SCALES - GENERAL
PAINLEVEL_OUTOF10: 6
PAINLEVEL_OUTOF10: 2

## 2024-09-27 ASSESSMENT — PAIN - FUNCTIONAL ASSESSMENT: PAIN_FUNCTIONAL_ASSESSMENT: 0-10
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
## 2024-09-27 NOTE — DISCHARGE INSTRUCTIONS
Take Tylenol as needed for pain.  Take Keflex 4 times daily & doxycyline twice daily for skin/soft tissue infection of your thigh.  Seek medical attention for persistent fever over 101, rapidly spreading redness or other new or concerning symptoms.

## 2024-10-07 ENCOUNTER — ANTI-COAG VISIT (OUTPATIENT)
Dept: PHARMACY | Age: 54
End: 2024-10-07
Payer: COMMERCIAL

## 2024-10-07 DIAGNOSIS — Z79.01 LONG TERM (CURRENT) USE OF ANTICOAGULANTS: Primary | ICD-10-CM

## 2024-10-07 DIAGNOSIS — I82.91 CHRONIC EMBOLISM AND THROMBOSIS OF UNSPECIFIED VEIN: ICD-10-CM

## 2024-10-07 LAB
INTERNATIONAL NORMALIZATION RATIO, POC: 2.5
PROTHROMBIN TIME, POC: 0

## 2024-10-07 PROCEDURE — 99211 OFF/OP EST MAY X REQ PHY/QHP: CPT | Performed by: PHYSICIAN ASSISTANT

## 2024-10-07 PROCEDURE — 85610 PROTHROMBIN TIME: CPT | Performed by: PHYSICIAN ASSISTANT

## 2024-10-07 NOTE — PROGRESS NOTES
Mr. Stephen Kitchen is a 54 y.o. y/o male with history of pulmonary embolism with infarction. He has been taking warfarin since 2001   He presents today for anticoagulation monitoring and adjustment.  Pertinent PMH: had a DVT and two pulmonary e/mbolism. (+) hepatitis B (2/2016)  Patient Reported Findings:  Yes     No  [x]   []       Patient verifies current dosing regimen as listed- confirms---> has been doing half on Sat only ---> confirmed    []   [x]       S/S bleeding/bruising/swelling/SOB- denies   []   [x]       Blood in urine or stool- denies ---> bloody nose---> none    []   [x]       Procedures scheduled in the future at this time- possible colonoscopy, not yet scheduled, will call when colonoscopy scheduled---> Oct 6th, no holding instructions or clearance yet but states will not do lovenox so most likely just hold 5 days --> cancelled d/t car trouble---> plans to R/S --> colonoscopy scheduled 9/5, needs clearance from PCP office --> held warfarin 5 days prior and never resumed warfarin post op --> none upcoming --> is going to get colonoscopy in near future, not scheduled yet, going to see pcp first---> not scheduled yet    []   [x]       Missed Dose  has not taken warfarin in 10 days ---> denies --> 11/22 d/t alcohol intkae --> denies---> did miss several weeks ago ---> Sunday---> saturday and Sunday, out of town and forgot meds --> 6/22 and 6/23 d/t drinking. --> sat ---> denies   []   [x]       Extra Dose -denies   [x]   []       Change in medications -denies, started centrum for men 9/19 (does contain vit K) -->  had tylenol the last few days---> no new changes---> will call with change--> folinic plus---> cephalexin and doxycycline x 7 d on 9/27  [x]   []       Change in health/diet/appetite-- reports he is back in the gym and  has been having green smoothies daily and sal/ad ~4x/week. (This has been his daily vit k intake for ~ 3 days now) plans to continue as such. -->V8 instead of greens in drink,

## 2024-10-30 DIAGNOSIS — N52.9 DIFFICULTY ATTAINING ERECTION: ICD-10-CM

## 2024-10-30 RX ORDER — TADALAFIL 10 MG/1
TABLET ORAL
Qty: 30 TABLET | Refills: 1 | Status: SHIPPED | OUTPATIENT
Start: 2024-10-30

## 2024-10-30 NOTE — TELEPHONE ENCOUNTER
Last OV: 9/4/2024  Next OV: Visit date not found    Next appointment due:n/a    Last fill:8/2/2024  Refills:1

## 2024-11-07 ENCOUNTER — APPOINTMENT (OUTPATIENT)
Dept: PHARMACY | Age: 54
End: 2024-11-07
Payer: COMMERCIAL

## 2024-11-07 ENCOUNTER — TELEPHONE (OUTPATIENT)
Dept: PHARMACY | Age: 54
End: 2024-11-07

## 2024-11-14 ENCOUNTER — ANTI-COAG VISIT (OUTPATIENT)
Dept: PHARMACY | Age: 54
End: 2024-11-14
Payer: COMMERCIAL

## 2024-11-14 DIAGNOSIS — I82.91 CHRONIC EMBOLISM AND THROMBOSIS OF UNSPECIFIED VEIN: ICD-10-CM

## 2024-11-14 DIAGNOSIS — Z79.01 LONG TERM (CURRENT) USE OF ANTICOAGULANTS: Primary | ICD-10-CM

## 2024-11-14 LAB
INTERNATIONAL NORMALIZATION RATIO, POC: 3.6
PROTHROMBIN TIME, POC: 0

## 2024-11-14 PROCEDURE — 99212 OFFICE O/P EST SF 10 MIN: CPT | Performed by: PHYSICIAN ASSISTANT

## 2024-11-14 PROCEDURE — 85610 PROTHROMBIN TIME: CPT | Performed by: PHYSICIAN ASSISTANT

## 2024-11-14 NOTE — PROGRESS NOTES
INR 3.1. pt d/c on cephalexin and doxycycline x 7 d, will increase INR.   [x]   []       Other complaints discussed DOACs in comparison to warfarin --> discussed there is no non-pharmacologic way to thin blood      Clinical Outcomes:  Yes     No   []   [x]       Major bleeding event  []   [x]       Thromboembolic event  Takes warfarin in AM  Duration of warfarin Therapy: indefinitely    INR Range:  2.0-3.0  Cautious when holding warfarin--INR drops quickly     Patient is generally rarely in range. He fluctuates on his dose due to diet inconsistencies and alcohol. He self- doses and adjusts based on what he has been drinking. Non-compliant with apts.    INR is 3.6 today after possibly doubling dose on Sun     Patient does need colonoscopy coming up but not scheduled yet.  Hold dose tonight then continue to take 3.75mg on Tues and Sat and 7.5mg all other days   Encouraged to maintain a consistency of vegetables/salads.  Recheck INR in 4 weeks, 12/12    Does not need pw printed or apt card, has [facility]   Consent form signed on 1/4/24    Referring is Dr. Windy Livingston  INR (no units)   Date Value   09/27/2024 3.10 (H)   06/30/2023 2.01 (H)   04/15/2021 4.43 (H)   09/20/2018 2.1     INR,(POC) (no units)   Date Value   10/07/2024 2.5   09/12/2024 2.1   08/15/2024 1.9   08/01/2024 1.2     For Pharmacy Admin Tracking Only    Intervention Detail: Dose Adjustment: 1, reason: Therapy Optimization  Total # of Interventions Recommended: 1  Total # of Interventions Accepted: 1  Time Spent (min): 15

## 2024-12-10 DIAGNOSIS — N52.9 DIFFICULTY ATTAINING ERECTION: ICD-10-CM

## 2024-12-10 RX ORDER — TADALAFIL 10 MG/1
TABLET ORAL
Qty: 30 TABLET | Refills: 1 | Status: SHIPPED | OUTPATIENT
Start: 2024-12-10

## 2024-12-11 ENCOUNTER — TELEPHONE (OUTPATIENT)
Dept: PHARMACY | Age: 54
End: 2024-12-11

## 2024-12-11 NOTE — TELEPHONE ENCOUNTER
Pt presented to clinic today, 1 day early for appt. Explained schedule full today. Pt states unable to make appt tomorrow. R/s pt to RTC on 12/19

## 2025-01-07 ENCOUNTER — ANTI-COAG VISIT (OUTPATIENT)
Dept: PHARMACY | Age: 55
End: 2025-01-07
Payer: COMMERCIAL

## 2025-01-07 DIAGNOSIS — Z79.01 LONG TERM (CURRENT) USE OF ANTICOAGULANTS: Primary | ICD-10-CM

## 2025-01-07 DIAGNOSIS — I82.91 CHRONIC EMBOLISM AND THROMBOSIS OF UNSPECIFIED VEIN: ICD-10-CM

## 2025-01-07 LAB
INTERNATIONAL NORMALIZATION RATIO, POC: 1.8
PROTHROMBIN TIME, POC: 0

## 2025-01-07 PROCEDURE — 85610 PROTHROMBIN TIME: CPT

## 2025-01-07 PROCEDURE — 99211 OFF/OP EST MAY X REQ PHY/QHP: CPT
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
## 2025-01-22 ENCOUNTER — TELEPHONE (OUTPATIENT)
Dept: PHARMACY | Age: 55
End: 2025-01-22

## 2025-01-23 ENCOUNTER — APPOINTMENT (OUTPATIENT)
Dept: PHARMACY | Age: 55
End: 2025-01-23
Payer: COMMERCIAL

## 2025-01-30 ENCOUNTER — ANTI-COAG VISIT (OUTPATIENT)
Dept: PHARMACY | Age: 55
End: 2025-01-30
Payer: COMMERCIAL

## 2025-01-30 DIAGNOSIS — Z79.01 LONG TERM (CURRENT) USE OF ANTICOAGULANTS: Primary | ICD-10-CM

## 2025-01-30 DIAGNOSIS — I82.91 CHRONIC EMBOLISM AND THROMBOSIS OF UNSPECIFIED VEIN: ICD-10-CM

## 2025-01-30 LAB
INTERNATIONAL NORMALIZATION RATIO, POC: 2.6
PROTHROMBIN TIME, POC: 0

## 2025-01-30 PROCEDURE — 85610 PROTHROMBIN TIME: CPT | Performed by: PHYSICIAN ASSISTANT

## 2025-01-30 PROCEDURE — 99211 OFF/OP EST MAY X REQ PHY/QHP: CPT | Performed by: PHYSICIAN ASSISTANT

## 2025-01-30 NOTE — PROGRESS NOTES
Mr. Stephen Kitchen is a 54 y.o. y/o male with history of pulmonary embolism with infarction. He has been taking warfarin since 2001   He presents today for anticoagulation monitoring and adjustment.  Pertinent PMH: had a DVT and two pulmonary e/mbolism. (+) hepatitis B (2/2016)  Patient Reported Findings:  Yes     No  [x]   []       Patient verifies current dosing regimen as listed- confirms---> has been doing half on Sat only ---> confirmed    []   [x]       S/S bleeding/bruising/swelling/SOB- denies   []   [x]       Blood in urine or stool- denies ---> bloody nose---> none    []   [x]       Procedures scheduled in the future at this time- possible colonoscopy, not yet scheduled, will call when colonoscopy scheduled---> Oct 6th, no holding instructions or clearance yet but states will not do lovenox so most likely just hold 5 days --> cancelled d/t car trouble---> plans to R/S --> colonoscopy scheduled 9/5, needs clearance from PCP office --> held warfarin 5 days prior and never resumed warfarin post op --> none upcoming --> is going to get colonoscopy in near future, not scheduled yet, going to see pcp first---> not scheduled yet    [x]   []       Missed Dose  skipped doses on Christmas and New Years Deborah for alcohol consumption -> denies -> missed dose on 1/27 due to alcohol consumption   []   [x]       Extra Dose -denies --> might have doubled dose on Sun   []   [x]       Change in medications -denies, started centrum for men 9/19 (does contain vit K) -->  had tylenol the last few days---> no new changes---> will call with change--> folinic plus---> cephalexin and doxycycline x 7 d on 9/27 --> denies changes   [x]   []       Change in health/diet/appetite-- reports he is back in the gym and  has been having green smoothies daily and sal/ad ~4x/week. (This has been his daily vit k intake for ~ 3 days now) plans to continue as such. -->V8 instead of greens in drink, back to feeling better  --> no vit k since end of

## 2025-02-04 DIAGNOSIS — N52.9 DIFFICULTY ATTAINING ERECTION: ICD-10-CM

## 2025-02-05 RX ORDER — TADALAFIL 10 MG/1
TABLET ORAL
Qty: 30 TABLET | Refills: 1 | Status: SHIPPED | OUTPATIENT
Start: 2025-02-05

## 2025-02-12 ENCOUNTER — TELEPHONE (OUTPATIENT)
Dept: PHARMACY | Age: 55
End: 2025-02-12

## 2025-02-13 ENCOUNTER — APPOINTMENT (OUTPATIENT)
Dept: PHARMACY | Age: 55
End: 2025-02-13
Payer: COMMERCIAL

## 2025-02-20 ENCOUNTER — APPOINTMENT (OUTPATIENT)
Dept: PHARMACY | Age: 55
End: 2025-02-20
Payer: COMMERCIAL

## 2025-02-27 ENCOUNTER — ANTI-COAG VISIT (OUTPATIENT)
Dept: PHARMACY | Age: 55
End: 2025-02-27
Payer: COMMERCIAL

## 2025-02-27 DIAGNOSIS — Z79.01 LONG TERM (CURRENT) USE OF ANTICOAGULANTS: Primary | ICD-10-CM

## 2025-02-27 DIAGNOSIS — I82.91 CHRONIC EMBOLISM AND THROMBOSIS OF UNSPECIFIED VEIN: ICD-10-CM

## 2025-02-27 LAB
INTERNATIONAL NORMALIZATION RATIO, POC: 1.3
PROTHROMBIN TIME, POC: 0

## 2025-02-27 PROCEDURE — 99211 OFF/OP EST MAY X REQ PHY/QHP: CPT | Performed by: INTERNAL MEDICINE

## 2025-02-27 PROCEDURE — 85610 PROTHROMBIN TIME: CPT | Performed by: INTERNAL MEDICINE

## 2025-02-27 NOTE — PROGRESS NOTES
Mr. Stephen Kitchen is a 55 y.o. y/o male with history of pulmonary embolism with infarction. He has been taking warfarin since 2001   He presents today for anticoagulation monitoring and adjustment.  Pertinent PMH: had a DVT and two pulmonary e/mbolism. (+) hepatitis B (2/2016)  Patient Reported Findings:  Yes     No  [x]   []       Patient verifies current dosing regimen as listed- confirms---> has been doing half on Sat only ---> confirmed    []   [x]       S/S bleeding/bruising/swelling/SOB- denies   []   [x]       Blood in urine or stool- denies ---> bloody nose---> none    []   [x]       Procedures scheduled in the future at this time- possible colonoscopy, not yet scheduled, will call when colonoscopy scheduled---> Oct 6th, no holding instructions or clearance yet but states will not do lovenox so most likely just hold 5 days --> cancelled d/t car trouble---> plans to R/S --> colonoscopy scheduled 9/5, needs clearance from PCP office --> held warfarin 5 days prior and never resumed warfarin post op --> none upcoming --> is going to get colonoscopy in near future, not scheduled yet, going to see pcp first---> not scheduled yet    [x]   []       Missed Dose  skipped doses on Christmas and New Years Deborah for alcohol consumption -> denies -> missed dose on 1/27 due to alcohol consumption --> missed three doses in past week   []   [x]       Extra Dose -denies --> might have doubled dose on Sun   []   [x]       Change in medications -denies, started centrum for men 9/19 (does contain vit K) -->  had tylenol the last few days---> no new changes---> will call with change--> folinic plus---> cephalexin and doxycycline x 7 d on 9/27 --> denies changes   [x]   []       Change in health/diet/appetite-- reports he is back in the gym and  has been having green smoothies daily and sal/ad ~4x/week. (This has been his daily vit k intake for ~ 3 days now) plans to continue as such. -->V8 instead of greens in drink, back to feeling

## 2025-03-13 ENCOUNTER — TELEPHONE (OUTPATIENT)
Dept: PHARMACY | Age: 55
End: 2025-03-13

## 2025-03-13 NOTE — TELEPHONE ENCOUNTER
Patient has moved to [place] and has not established care with a new PCP. His insurance has changed. He is calling on it today and will call back to r/s.